# Patient Record
Sex: FEMALE | Race: WHITE | NOT HISPANIC OR LATINO | Employment: FULL TIME | URBAN - METROPOLITAN AREA
[De-identification: names, ages, dates, MRNs, and addresses within clinical notes are randomized per-mention and may not be internally consistent; named-entity substitution may affect disease eponyms.]

---

## 2017-11-27 ENCOUNTER — MYC MEDICAL ADVICE (OUTPATIENT)
Dept: NURSING | Facility: CLINIC | Age: 37
End: 2017-11-27

## 2017-11-27 DIAGNOSIS — Z32.01 PREGNANCY TEST POSITIVE: Primary | ICD-10-CM

## 2017-11-28 NOTE — TELEPHONE ENCOUNTER
Rescheduled pt's appointments so that she has OB intake and new OB with Dr. Best.  Is also scheduled for lab draw for HCG.  U/S is not ordered yet.  Isis Luciano RN

## 2017-11-28 NOTE — TELEPHONE ENCOUNTER
Daniellet was sent to patient, but no response. Call to patient and left message. Need to see if she is pregnant and LMP. If she is, will do serial quants and early US. Needs to set up nurse intake and new prenatal with BE, but does NOT need to be see on 12/22.   Josiane Suero

## 2017-11-30 DIAGNOSIS — Z32.01 PREGNANCY TEST POSITIVE: ICD-10-CM

## 2017-11-30 PROCEDURE — 84702 CHORIONIC GONADOTROPIN TEST: CPT | Performed by: OBSTETRICS & GYNECOLOGY

## 2017-11-30 PROCEDURE — 36415 COLL VENOUS BLD VENIPUNCTURE: CPT | Performed by: OBSTETRICS & GYNECOLOGY

## 2017-12-01 LAB — B-HCG SERPL-ACNC: ABNORMAL IU/L (ref 0–5)

## 2017-12-12 ENCOUNTER — PRENATAL OFFICE VISIT (OUTPATIENT)
Dept: NURSING | Facility: CLINIC | Age: 37
End: 2017-12-12
Payer: COMMERCIAL

## 2017-12-12 VITALS
HEIGHT: 69 IN | DIASTOLIC BLOOD PRESSURE: 65 MMHG | SYSTOLIC BLOOD PRESSURE: 110 MMHG | RESPIRATION RATE: 14 BRPM | OXYGEN SATURATION: 99 % | TEMPERATURE: 97.9 F | HEART RATE: 88 BPM | BODY MASS INDEX: 27.36 KG/M2 | WEIGHT: 184.7 LBS

## 2017-12-12 DIAGNOSIS — Z34.80 PRENATAL CARE, SUBSEQUENT PREGNANCY, UNSPECIFIED TRIMESTER: Primary | ICD-10-CM

## 2017-12-12 LAB
ABO + RH BLD: NORMAL
ABO + RH BLD: NORMAL
ALBUMIN UR-MCNC: NEGATIVE MG/DL
APPEARANCE UR: CLEAR
BILIRUB UR QL STRIP: NEGATIVE
BLD GP AB SCN SERPL QL: NORMAL
BLOOD BANK CMNT PATIENT-IMP: NORMAL
COLOR UR AUTO: YELLOW
ERYTHROCYTE [DISTWIDTH] IN BLOOD BY AUTOMATED COUNT: 13.2 % (ref 10–15)
GLUCOSE UR STRIP-MCNC: NEGATIVE MG/DL
HCT VFR BLD AUTO: 38.8 % (ref 35–47)
HGB BLD-MCNC: 13.2 G/DL (ref 11.7–15.7)
HGB UR QL STRIP: NEGATIVE
KETONES UR STRIP-MCNC: ABNORMAL MG/DL
LEUKOCYTE ESTERASE UR QL STRIP: NEGATIVE
MCH RBC QN AUTO: 29.8 PG (ref 26.5–33)
MCHC RBC AUTO-ENTMCNC: 34 G/DL (ref 31.5–36.5)
MCV RBC AUTO: 88 FL (ref 78–100)
NITRATE UR QL: NEGATIVE
PH UR STRIP: 6.5 PH (ref 5–7)
PLATELET # BLD AUTO: 229 10E9/L (ref 150–450)
RBC # BLD AUTO: 4.43 10E12/L (ref 3.8–5.2)
SOURCE: ABNORMAL
SP GR UR STRIP: 1.02 (ref 1–1.03)
SPECIMEN EXP DATE BLD: NORMAL
UROBILINOGEN UR STRIP-ACNC: 0.2 EU/DL (ref 0.2–1)
WBC # BLD AUTO: 9.6 10E9/L (ref 4–11)

## 2017-12-12 PROCEDURE — 81003 URINALYSIS AUTO W/O SCOPE: CPT | Performed by: OBSTETRICS & GYNECOLOGY

## 2017-12-12 PROCEDURE — 86762 RUBELLA ANTIBODY: CPT | Performed by: OBSTETRICS & GYNECOLOGY

## 2017-12-12 PROCEDURE — 99207 ZZC NO CHARGE NURSE ONLY: CPT

## 2017-12-12 PROCEDURE — 86850 RBC ANTIBODY SCREEN: CPT | Performed by: OBSTETRICS & GYNECOLOGY

## 2017-12-12 PROCEDURE — 86901 BLOOD TYPING SEROLOGIC RH(D): CPT | Performed by: OBSTETRICS & GYNECOLOGY

## 2017-12-12 PROCEDURE — 87340 HEPATITIS B SURFACE AG IA: CPT | Performed by: OBSTETRICS & GYNECOLOGY

## 2017-12-12 PROCEDURE — 87389 HIV-1 AG W/HIV-1&-2 AB AG IA: CPT | Performed by: OBSTETRICS & GYNECOLOGY

## 2017-12-12 PROCEDURE — 86780 TREPONEMA PALLIDUM: CPT | Performed by: OBSTETRICS & GYNECOLOGY

## 2017-12-12 PROCEDURE — 87086 URINE CULTURE/COLONY COUNT: CPT | Performed by: OBSTETRICS & GYNECOLOGY

## 2017-12-12 PROCEDURE — 85027 COMPLETE CBC AUTOMATED: CPT | Performed by: OBSTETRICS & GYNECOLOGY

## 2017-12-12 PROCEDURE — 36415 COLL VENOUS BLD VENIPUNCTURE: CPT | Performed by: OBSTETRICS & GYNECOLOGY

## 2017-12-12 PROCEDURE — 86900 BLOOD TYPING SEROLOGIC ABO: CPT | Performed by: OBSTETRICS & GYNECOLOGY

## 2017-12-12 NOTE — MR AVS SNAPSHOT
After Visit Summary   12/12/2017    Roshni Oliver    MRN: [id number]           Patient Information     Date Of Birth          1980        Visit Information        Provider Department      12/12/2017 2:30 PM RD OB NURSE EDUCATION [facility]        Today's Diagnoses     Prenatal care, subsequent pregnancy, unspecified trimester    -  1       Follow-ups after your visit        Additional Services     MAT FETAL MED CTR REFERRAL-PREGNANCY       >> Patient may proceed with recommendations for further testing as directed by the Maternal Fetal Medicine Specialist >>    >> If requesting Fetal Echo: MFM will determine appropriate location for exam due to indication.    >> If requesting Lung Maturity Amnio:  If results indicate fetal lung maturity, induction or C/S is recommended within 36 hours.  Please schedule accordingly.     Dear Patient:   Please be aware that coverage of these services is subject to the terms and limitations of your health insurance plan.  Call member services at your health plan with any benefit or coverage questions.      Please bring the following to your appointment:    >>  Any x-rays, CTs or MRIs which have been performed.  Contact the facility where they were done to arrange for  prior to your scheduled appointment.  Any new CT, MRI or other procedures ordered by your specialist must be performed at a [facility] facility or coordinated by your clinic's referral office.  >>  List of current medications   >>  This referral request   >>  Any documents/labs given to you for this referral                  Your next 10 appointments already scheduled     Dec 15, 2017  4:00 PM CST   US OB < 14 WEEKS SINGLE with RDUS1   [facility] [place]    [address] [place] [address]  [address] [place] [address] [phone]           Please bring a list of your medicines (including vitamins, minerals and over-the-counter drugs). Also, tell your  doctor about any allergies you may have. Wear comfortable clothes and leave your valuables at home.  If you re less than 20 weeks drink four 8-ounce glasses of fluid an hour before your exam. If you need to empty your bladder before your exam, try to release only a little urine. Then, drink another glass of fluid.  You may have up to two family members in the exam room. If you bring a small child, an adult must be there to care for him or her.  Please call the Imaging Department at your exam site with any questions.            Jan 03, 2018  3:00 PM CST   [facility] Prenatal with India Best MD   [facility]    [address] [facility] [address] [phone]              Future tests that were ordered for you today     Open Future Orders        Priority Expected Expires Ordered    M Genetic Counseling Routine  12/12/2018 12/12/2017    [facility] Nuchal Transluc w US Single Routine  10/12/2018 12/12/2017            Who to contact     If you have questions or need follow up information about today's clinic visit or your schedule please contact [facility] directly at [phone].  Normal or non-critical lab and imaging results will be communicated to you by MyChart, letter or phone within 4 business days after the clinic has received the results. If you do not hear from us within 7 days, please contact the clinic through MyChart or phone. If you have a critical or abnormal lab result, we will notify you by phone as soon as possible.  Submit refill requests through [employer] or call your pharmacy and they will forward the refill request to us. Please allow 3 business days for your refill to be completed.          Additional Information About Your Visit        [employer] Information     [employer] gives you secure access to your electronic health record. If you see a primary care provider, you can also send messages to your care team and make  "appointments. If you have questions, please call your primary care clinic.  If you do not have a primary care provider, please call [phone] and they will assist you.        Care EveryWhere ID     This is your Care EveryWhere ID. This could be used by other organizations to access your [facility] medical records  [id number]        Your Vitals Were     Pulse Temperature Respirations Height Last Period Pulse Oximetry    88 97.9  F (36.6  C) 14 5' 9\" (1.753 m) 10/14/2017 (Exact Date) 99%    Breastfeeding? BMI (Body Mass Index)                No 27.28 kg/m2           Blood Pressure from Last 3 Encounters:   12/12/17 110/65   06/03/16 110/64   04/21/16 113/80    Weight from Last 3 Encounters:   12/12/17 184 lb 11.2 oz (83.8 kg)   06/03/16 172 lb (78 kg)   04/15/16 204 lb (92.5 kg)              We Performed the Following     ABO/RH Type and Screen     Anti Treponema     CBC with Platelets     Hepatitis B surface antigen     HIV Antigen Antibody Combo     MAT FETAL MED CTR REFERRAL-PREGNANCY     Rubella Antibody IgG Quantitative     UA without Microscopic     Urine Culture Aerobic Bacterial        Primary Care Provider Office Phone # Fax #    Sonia CASEY [facility] [phone] [address] [facility] [address]        Equal Access to Services     FLORENCIA BRAVO AH: Hadii aad ku hadasho Soomaali, waaxda luqadaha, qaybta kaalmada adeegyada, shan zheng hayalissa taylor. So [facility] [phone].    ATENCIÓN: Si habla español, tiene a gonzáles disposición servicios gratuitos de asistencia lingüística. Llame al [phone].    We comply with applicable federal civil rights laws and [place] laws. We do not discriminate on the basis of race, color, national origin, age, disability, sex, sexual orientation, or gender identity.            Thank you!     Thank you for choosing [facility]  for your care. Our goal is always to provide you with excellent care. Hearing back from " our patients is one way we can continue to improve our services. Please take a few minutes to complete the written survey that you may receive in the mail after your visit with us. Thank you!             Your Updated Medication List - Protect others around you: Learn how to safely use, store and throw away your medicines at www.disposemymeds.org.          This list is accurate as of: 12/12/17  3:56 PM.  Always use your most recent med list.                   Brand Name Dispense Instructions for use Diagnosis    prenatal multivitamin plus iron 27-0.8 MG Tabs per tablet      Take 1 tablet by mouth daily

## 2017-12-12 NOTE — PROGRESS NOTES
Patient presents for new ob teaching and labs, third pregnancy. LMP was 10/14/17. US scheduled for 12/15. Handouts reviewed and given. First trimester screen ordered. Patient is having a lot of nausea and vomiting. Discussed combination of Vitamin B6 and Unisom tablets. Has appointment scheduled with Dr Best on 1/3/18.    Caffeine intake/servings daily - 1  Calcium intake/servings daily - 2  Exercise 0 times weekly - describe 0  Sunscreen used - Yes  Seatbelts used - Yes  Guns stored in the home - No  Self Breast Exam - Yes  Pap test up to date -  Yes  Eye exam up to date -  No  Dental exam up to date -  Yes  DEXA scan up to date -  Not Applicable  Flex Sig/Colonoscopy up to date -  Not Applicable  Mammography up to date -  Not Applicable  Immunizations reviewed and up to date - Yes  Abuse: Current or Past (Physical, Sexual or Emotional) - No  Do you feel safe in your environment - Yes  Do you cope well with stress - Yes  Do you suffer from insomnia - No  Last updated by: Kerry Hung  2017    Prenatal OB Questionnaire  Past Medical History  Diabetes   No  Hypertension   No  Heart Disease, mitral valve prolapse, or rheumatic fever?   No  An autoimmune disorder such as Lupus or Rheumatoid Arthritis?   No  Kidney Disease or Urinary Tract Infection?   No  Epilepsy, seizures or spells?   No  Migraine headaches?   No  A stroke or loss of function or sensation?   No  Any other neurological problems?   No  Have you ever been treated for depression?  No  Are you having problems with crying spells or loss of self-esteem?   No  Have you ever required psychiatric care?   No  Have you ever hepatitis, liver disease or jaundice?   Yes ( jaundice)  Have you ever been treated for blood clots in your veins, deep venous thrombosis, inflammation in the veins, thrombosis, phlebitis, pulmonary embolism or varicosities?   No  Have you had excessive bleeding after surgery or dental work?   No  Do you bleed more than  other women after a cut or scratch?   No  Do you have a history of anemia?   No  Have you ever been treated for thyroid problems or taken thyroid medication?  No  Do you have any other endocrine problems?  No  Have you ever been in a major accident or suffered serious trauma?   No  Within the last year, has anyone hit slapped, kicked or otherwise hurt you?  No  In the last year, has anyone forced you to have sex when you didn't want to?  No  Have you ever had a blood transfusion?   No  Would you refuse a blood transfusion if a doctor judged it to be medically necessary?   No  If you answered yes, would you rather die than have a blood transfusion?   N/A  If you answered yes, is this for Mandaeism reasons?   N/A  Does anyone in your home smoke?   No  Do you use tobacco products?  No  Do you drink beer, wine, hard liquor?  No  Do you use any of the following: marijuana, speed, cocaine, heroine, hallucinogens, or other drugs?  No  Is your blood type Rh negative?   No  Have you ever had abnormal antibodies in your blood?   No  Have you ever had asthma?   No  Have you ever had tuberculosis?   No  Do you have any allergies to drugs or over-the-counter medications?   Yes Sulfa drugs    Allergies as of 12/12/2017:    Allergies as of 12/12/2017 - Rober as Reviewed 06/03/2016   Allergen Reaction Noted     Sulfa drugs Itching 11/02/2010       Do you have any breast problems?   No  Have you ever ?   Yes  Have you had any gynecological surgical procedures such as cervical conization, a LEEP procedure, laser treatment, cryosurgery of the cervix, or a dilation and curettage, etc?  No  Have you had any other surgical procedures?  Yes Umbilical hernia - age 4  Have you been hospitalized for a nonsurgical reason excluding normal delivery?   No  Have you ever had any anesthetic complications?   No  Have you ever had an abnormal pap smear?   No  Do you have a history of abnormalities of the uterus?   No  Did it take you more  than one year to become pregnant?   No  Have you ever been evaluated or treated for infertility?   No  Is there a history of medical problems in your family, which you feel might adversely affect your health or pregnancy?   No  Do you have any other problems we have not asked you about which you feel may be important to this pregnancy?  No    Symptoms since Last Menstrual Period  Do you have any of the following:    *abdominal pain  No  *blood in stool or urine  No  *chest pain  No  *shortness of breath  No  *coughing or vomiting up blood No  *heart racing or skipping beats  No  *nausea and vomiting  Yes  *pain with urination  No  *vaginal discharge or bleeding  No  Current medications are:  Current Outpatient Prescriptions   Medication Sig Dispense Refill     norethindrone (MICRONOR) 0.35 MG per tablet Take 1 tablet (0.35 mg) by mouth daily 84 tablet 3     ibuprofen (ADVIL,MOTRIN) 400 MG tablet Take 1-2 tablets (400-800 mg) by mouth every 6 hours as needed for other (cramping) 60 tablet 1     Prenatal Vit-Fe Fumarate-FA (PRENATAL MULTIVITAMIN  PLUS IRON) 27-0.8 MG TABS Take 1 tablet by mouth daily         Genetic Screening  At the time of birth, will you be 35 years old or older?  Yes  Has the patient, baby s father, or anyone in either family had:  Thalassemia (Italian, Greek, Mediterranean, or  background only) and an MCV result less than 80?  No  Neural tube defect such as meningomyelocele, spina bifida or anencephaly?  No  Congenital heart defect?  No  Down s syndrome?  No  Ronal-Sach s disease (Muslim, Cajun, French-Indian)?  No  Sickle cell disease or trait (Rima)?  No  Hemophilia or other inherited problems of blood coagulation? No  Muscular dystrophy?  No  Cystic Fibrosis?  No  [place] s chorea?  No  Mental retardation/autism? No   If yes, was the person tested for fragile X?  No  Any other inherited genetic or chromosomal disorder?  No  Maternal metabolic disorder (e.g. insulin-dependent  diabetes, PKU)? No  A child with birth defects not listed above?  No  Recurrent pregnancy loss or a stillbirth?  No  Has the patient had any medications/street drugs/alcohol since her last menstrual period? No  Does the patient or baby s father have any other genetic risks?  No  Infection History  Do you object to being tested for Hepatitis B? No  Do you object to being tested for HIV? No  Do you feel that you are at high risk for coming in contact with the AIDS virus?  No  Have you ever been treated for tuberculosis?  No  Have you ever received the BCG vaccine for tuberculosis?  No  Have you ever had a positive skin test for tuberculosis? No  Do you live with someone who has tuberculosis?  No  Have you ever been exposed to tuberculosis?  No  Do you have genital herpes?  No  Does your partner have genital herpes?  No  Have you had a rash or viral illness since your last period?  No  Have you ever had Gonorrhea, Chlamydia, Syphilis, venereal warts, trichomoniasis, pelvic inflammatory disease or any other sexually transmitted disease?  No  Do you know if you are a genital group B streptococcus carrier? No  You have not had chicken pox/varicella  No  Have you been vaccinated against chicken pox?  No  Have you had any other infectious disease? No        Early ultrasound screening tool:    Does patient have irregular periods?  No  Did patient use hormonal birth control in the three months prior to positive urine pregnancy test? No  Is the patient breastfeeding?  No  Is the patient 10 weeks or greater at time of education visit?  No    Kerry Hung RN-BSN

## 2017-12-13 LAB
BACTERIA SPEC CULT: NORMAL
HBV SURFACE AG SERPL QL IA: NONREACTIVE
HIV 1+2 AB+HIV1 P24 AG SERPL QL IA: NONREACTIVE
RUBV IGG SERPL IA-ACNC: 12 IU/ML
SPECIMEN SOURCE: NORMAL
T PALLIDUM IGG+IGM SER QL: NEGATIVE

## 2017-12-21 ENCOUNTER — RADIANT APPOINTMENT (OUTPATIENT)
Dept: ULTRASOUND IMAGING | Facility: CLINIC | Age: 37
End: 2017-12-21
Attending: OBSTETRICS & GYNECOLOGY
Payer: COMMERCIAL

## 2017-12-21 DIAGNOSIS — Z32.01 PREGNANCY TEST POSITIVE: ICD-10-CM

## 2017-12-21 PROCEDURE — 76815 OB US LIMITED FETUS(S): CPT | Performed by: OBSTETRICS & GYNECOLOGY

## 2018-01-03 ENCOUNTER — PRENATAL OFFICE VISIT (OUTPATIENT)
Dept: OBGYN | Facility: CLINIC | Age: 38
End: 2018-01-03
Payer: COMMERCIAL

## 2018-01-03 VITALS
WEIGHT: 167 LBS | BODY MASS INDEX: 24.66 KG/M2 | TEMPERATURE: 98 F | DIASTOLIC BLOOD PRESSURE: 70 MMHG | SYSTOLIC BLOOD PRESSURE: 108 MMHG

## 2018-01-03 DIAGNOSIS — K21.9 GASTROESOPHAGEAL REFLUX DISEASE WITHOUT ESOPHAGITIS: ICD-10-CM

## 2018-01-03 DIAGNOSIS — O21.0 HYPEREMESIS GRAVIDARUM: ICD-10-CM

## 2018-01-03 DIAGNOSIS — Z34.00 SUPERVISION OF NORMAL FIRST PREGNANCY, ANTEPARTUM: Primary | ICD-10-CM

## 2018-01-03 DIAGNOSIS — Z23 NEED FOR PROPHYLACTIC VACCINATION AND INOCULATION AGAINST INFLUENZA: ICD-10-CM

## 2018-01-03 PROCEDURE — 99207 ZZC FIRST OB VISIT: CPT | Performed by: OBSTETRICS & GYNECOLOGY

## 2018-01-03 PROCEDURE — 90686 IIV4 VACC NO PRSV 0.5 ML IM: CPT | Performed by: OBSTETRICS & GYNECOLOGY

## 2018-01-03 PROCEDURE — 90471 IMMUNIZATION ADMIN: CPT | Performed by: OBSTETRICS & GYNECOLOGY

## 2018-01-03 RX ORDER — ONDANSETRON 4 MG/1
4-8 TABLET, ORALLY DISINTEGRATING ORAL EVERY 8 HOURS PRN
Qty: 60 TABLET | Refills: 3 | Status: SHIPPED | OUTPATIENT
Start: 2018-01-03 | End: 2018-01-19

## 2018-01-03 RX ORDER — OMEPRAZOLE 40 MG/1
40 CAPSULE, DELAYED RELEASE ORAL DAILY
Qty: 90 CAPSULE | Refills: 3 | Status: SHIPPED | OUTPATIENT
Start: 2018-01-03 | End: 2018-07-03

## 2018-01-03 NOTE — PROGRESS NOTES
Here with spouse and happy about this pregnancy. Dates by LMP c/w 9 wk u/s. Wants to do first trimester screen and will schedule. Has been very sick and losing weight, cannot even keep water down and lots of GERD. Will start zofran now and prilosec, discussed can do IV fluids if needed and just let us know. Flu shot today. Providers/residents, weight gain/exercise, delivery discussed. RTC 4 weeks. BE    HPI:  Roshni Oliver is a 37 year old female Patient's last menstrual period was 10/14/2017 (exact date). at 11w4d, Estimated Date of Delivery: Jul 21, 2018.  She denies vaginal bleeding and abdominal pain. Happy about pregnancy.   No other c/o.    Past Medical History:   Diagnosis Date     UTI (urinary tract infection) 05/11       Past Surgical History:   Procedure Laterality Date     DENTAL SURGERY  2004    wisdom teeth     HERNIA REPAIR, UMBILICAL  5 y/o, 1983       Allergies: Sulfa drugs     EXAM:  Blood pressure 108/70, temperature 98  F (36.7  C), temperature source Oral, weight 167 lb (75.8 kg), last menstrual period 10/14/2017, not currently breastfeeding.   BMI= Body mass index is 24.66 kg/(m^2).  General - pleasant female in no acute distress.  Neck - supple without lymphadenopathy or thyromegaly.  Lungs - clear to auscultation bilaterally.  Heart - regular rate and rhythm without murmur.  Breast - no nodularity, asymmetry or nipple discharge bilaterally.  Abdomen - soft, nontender, nondistended, no hepatosplenomegaly.  Pelvic - EG: normal adult female, BUS: within normal limits, Vagina: well rugated, no discharge, Cervix: no lesions or CMT, closed/long Uterus: gravid, consistant with dates, mobile, Adnexae: no masses or tenderness.  Rectovaginal - deferred.  Musculoskeletal - no gross deformities.  Neurological - normal strength, sensation, and mental status.    Doptones were 152    ASSESSMENT/PLAN:  (Z34.00) Supervision of normal first pregnancy, antepartum  (primary encounter diagnosis)  Comment:  AMA  Plan: Offered genetic testing ([facility]/amnio), genetic counseling, quad screen, first trimester screening and level 2 ultrasound as options.  Patient to decide.    (K21.9) Gastroesophageal reflux disease without esophagitis  Plan: omeprazole (PRILOSEC) 40 MG capsule        Start daily prilosec.    (O21.0) Hyperemesis gravidarum  Comment: reglan did not work  Plan: ondansetron (ZOFRAN ODT) 4 MG ODT tab        Will try zofran    (Z23) Need for prophylactic vaccination and inoculation against influenza  Plan: FLU VAC, SPLIT VIRUS IM > 3 YO (QUADRIVALENT)         [10952], Vaccine Administration, Initial         [83583]      Weight gain and exercise during pregnancy was discussed at today's visit.  The patient will return to clinic in 4 weeks for continued prenatal care.

## 2018-01-03 NOTE — MR AVS SNAPSHOT
After Visit Summary   1/3/2018    Roshni Oliver    MRN: [id number]           Patient Information     Date Of Birth          1980        Visit Information        Provider Department      1/3/2018 3:00 PM India Best MD [facility]        Today's Diagnoses     Supervision of normal first pregnancy, antepartum    -  1    Gastroesophageal reflux disease without esophagitis        Hyperemesis gravidarum        Need for prophylactic vaccination and inoculation against influenza           Follow-ups after your visit        Who to contact     If you have questions or need follow up information about today's clinic visit or your schedule please contact [facility] directly at [phone].  Normal or non-critical lab and imaging results will be communicated to you by MyChart, letter or phone within 4 business days after the clinic has received the results. If you do not hear from us within 7 days, please contact the clinic through [employer] or phone. If you have a critical or abnormal lab result, we will notify you by phone as soon as possible.  Submit refill requests through [employer] or call your pharmacy and they will forward the refill request to us. Please allow 3 business days for your refill to be completed.          Additional Information About Your Visit        MyChart Information     [employer] gives you secure access to your electronic health record. If you see a primary care provider, you can also send messages to your care team and make appointments. If you have questions, please call your primary care clinic.  If you do not have a primary care provider, please call [phone] and they will assist you.        Care EveryWhere ID     This is your Care EveryWhere ID. This could be used by other organizations to access your [facility] medical records  [id number]        Your Vitals Were     Temperature Last Period BMI (Body Mass Index)             98  F  (36.7  C) (Oral) 10/14/2017 (Exact Date) 24.66 kg/m2          Blood Pressure from Last 3 Encounters:   01/03/18 108/70   12/12/17 110/65   06/03/16 110/64    Weight from Last 3 Encounters:   01/03/18 167 lb (75.8 kg)   12/12/17 184 lb 11.2 oz (83.8 kg)   06/03/16 172 lb (78 kg)              We Performed the Following     FLU VAC, SPLIT VIRUS IM > 3 YO (QUADRIVALENT) [85392]     Vaccine Administration, Initial [37091]          Today's Medication Changes          These changes are accurate as of: 1/3/18  4:21 PM.  If you have any questions, ask your nurse or doctor.               Start taking these medicines.        Dose/Directions    omeprazole 40 MG capsule   Commonly known as:  priLOSEC   Used for:  Gastroesophageal reflux disease without esophagitis        Dose:  40 mg   Take 1 capsule (40 mg) by mouth daily Take 30-60 minutes before a meal.   Quantity:  90 capsule   Refills:  3       ondansetron 4 MG ODT tab   Commonly known as:  ZOFRAN ODT   Used for:  Hyperemesis gravidarum        Dose:  4-8 mg   Take 1-2 tablets (4-8 mg) by mouth every 8 hours as needed for nausea   Quantity:  60 tablet   Refills:  3            Where to get your medicines      These medications were sent to [facility] - [place], [place] - [address]  [address], [place] [address]     Phone:  [phone]     omeprazole 40 MG capsule    ondansetron 4 MG ODT tab                Primary Care Provider Office Phone # Fax #    Sonia Wendy Ash, APRN [facility] [phone]       [address] [place] [address]        Equal Access to Services     [facility] SHELLY AH: Hussein De La Garza, wachrista lunessadaha, qasolitariota kaalmada adekarstenyada, shan taylor. So [facility] [phone].    ATENCIÓN: Si habla español, tiene a gonzáles disposición servicios gratuitos de asistencia lingüística. Llame al [phone].    We comply with applicable federal civil rights laws and [place] laws. We do not  discriminate on the basis of race, color, national origin, age, disability, sex, sexual orientation, or gender identity.            Thank you!     Thank you for choosing [facility]  for your care. Our goal is always to provide you with excellent care. Hearing back from our patients is one way we can continue to improve our services. Please take a few minutes to complete the written survey that you may receive in the mail after your visit with us. Thank you!             Your Updated Medication List - Protect others around you: Learn how to safely use, store and throw away your medicines at www.disposemymeds.org.          This list is accurate as of: 1/3/18  4:21 PM.  Always use your most recent med list.                   Brand Name Dispense Instructions for use Diagnosis    metoclopramide 10 MG tablet    REGLAN    120 tablet    Take 1 tablet (10 mg) by mouth 4 times daily (before meals and nightly)    Hyperemesis gravidarum       omeprazole 40 MG capsule    priLOSEC    90 capsule    Take 1 capsule (40 mg) by mouth daily Take 30-60 minutes before a meal.    Gastroesophageal reflux disease without esophagitis       ondansetron 4 MG ODT tab    ZOFRAN ODT    60 tablet    Take 1-2 tablets (4-8 mg) by mouth every 8 hours as needed for nausea    Hyperemesis gravidarum       prenatal multivitamin plus iron 27-0.8 MG Tabs per tablet      Take 1 tablet by mouth daily

## 2018-01-03 NOTE — PROGRESS NOTES

## 2018-01-10 ENCOUNTER — PRE VISIT (OUTPATIENT)
Dept: MATERNAL FETAL MEDICINE | Facility: CLINIC | Age: 38
End: 2018-01-10

## 2018-01-12 ENCOUNTER — HOSPITAL ENCOUNTER (OUTPATIENT)
Dept: ULTRASOUND IMAGING | Facility: CLINIC | Age: 38
Discharge: HOME | End: 2018-01-12
Attending: OBSTETRICS & GYNECOLOGY | Admitting: OBSTETRICS & GYNECOLOGY
Payer: COMMERCIAL

## 2018-01-12 ENCOUNTER — OFFICE VISIT (OUTPATIENT)
Dept: MATERNAL FETAL MEDICINE | Facility: CLINIC | Age: 38
End: 2018-01-12
Attending: OBSTETRICS & GYNECOLOGY
Payer: COMMERCIAL

## 2018-01-12 DIAGNOSIS — O26.90 PREGNANCY RELATED CONDITION, ANTEPARTUM: ICD-10-CM

## 2018-01-12 DIAGNOSIS — O09.521 AMA (ADVANCED MATERNAL AGE) MULTIGRAVIDA 35+, FIRST TRIMESTER: ICD-10-CM

## 2018-01-12 DIAGNOSIS — O09.521 AMA (ADVANCED MATERNAL AGE) MULTIGRAVIDA 35+, FIRST TRIMESTER: Primary | ICD-10-CM

## 2018-01-12 DIAGNOSIS — O09.521 ELDERLY MULTIGRAVIDA IN FIRST TRIMESTER: Primary | ICD-10-CM

## 2018-01-12 DIAGNOSIS — Z36.82 ENCOUNTER FOR (NT) NUCHAL TRANSLUCENCY SCAN: ICD-10-CM

## 2018-01-12 PROCEDURE — 40000791 ZZHCL STATISTIC VERIFI PRENATAL TRISOMY 21,18,13: Performed by: OBSTETRICS & GYNECOLOGY

## 2018-01-12 PROCEDURE — 76813 OB US NUCHAL MEAS 1 GEST: CPT

## 2018-01-12 PROCEDURE — 96040 ZZH GENETIC COUNSELING, EACH 30 MINUTES: CPT | Mod: ZF | Performed by: GENETIC COUNSELOR, MS

## 2018-01-12 PROCEDURE — 36415 COLL VENOUS BLD VENIPUNCTURE: CPT | Performed by: OBSTETRICS & GYNECOLOGY

## 2018-01-12 NOTE — MR AVS SNAPSHOT
After Visit Summary   1/12/2018    Roshni Oliver    MRN: [id number]           Patient Information     Date Of Birth          1980        Visit Information        Provider Department      1/12/2018 2:00 PM Jose Alejandro Figueroa MD [facility] Maternal Fetal Medicine [facility]        Today's Diagnoses     Elderly multigravida in first trimester    -  1    Encounter for (NT) nuchal translucency scan           Follow-ups after your visit        Your next 10 appointments already scheduled     Feb 26, 2018  2:15 PM CST   (Arrive by 2:00 PM)   MFM US COMP with URMFMUSR2   [facility] Maternal Fetal Medicine Ultrasound - [facility])    [address]  [facility] [id number] [phone]           Wear comfortable clothes and leave your valuables at home.            Feb 26, 2018  2:45 PM CST   Radiology MD with UR JAMMIE HERNANDEZ   [facility] Maternal Fetal Medicine - [facility])    [address]  [facility] [address]   [phone]           Please arrive at the time given for your first appointment. This visit is used internally to schedule the physician's time during your ultrasound.              Future tests that were ordered for you today     Open Future Orders        Priority Expected Expires Ordered    M US Comprehensive Single Routine  11/12/2018 1/12/2018            Who to contact     If you have questions or need follow up information about today's clinic visit or your schedule please contact [facility] MATERNAL FETAL MEDICINE [facility] directly at [phone].  Normal or non-critical lab and imaging results will be communicated to you by MyChart, letter or phone within 4 business days after the clinic has received the results. If you do not hear from us within 7 days, please contact the clinic through MyChart or phone. If you have a critical or abnormal lab result, we will notify you by phone as soon as  possible.  Submit refill requests through [employer] or call your pharmacy and they will forward the refill request to us. Please allow 3 business days for your refill to be completed.          Additional Information About Your Visit        [email or web address] Information     [employer] gives you secure access to your electronic health record. If you see a primary care provider, you can also send messages to your care team and make appointments. If you have questions, please call your primary care clinic.  If you do not have a primary care provider, please call [phone] and they will assist you.        Care EveryWhere ID     This is your Care EveryWhere ID. This could be used by other organizations to access your [facility] medical records  [id number]        Your Vitals Were     Last Period                   10/14/2017 (Exact Date)            Blood Pressure from Last 3 Encounters:   01/03/18 108/70   12/12/17 110/65   06/03/16 110/64    Weight from Last 3 Encounters:   01/03/18 75.8 kg (167 lb)   12/12/17 83.8 kg (184 lb 11.2 oz)   06/03/16 78 kg (172 lb)               Primary Care Provider Office Phone # Fax #    Sonia Nguyen CASEY [facility] [phone] [address] [facility] [address]        Equal Access to Services     FLORENCIA BRAVO AH: Hadii aad ku hadasho Soomaali, waaxda luqadaha, qaybta kaalmada adeegyada, shan taylor. So [place] [phone].    ATENCIÓN: Si habla español, tiene a gonzáles disposición servicios gratuitos de asistencia lingüística. Llame al [phone].    We comply with applicable federal civil rights laws and [place] laws. We do not discriminate on the basis of race, color, national origin, age, disability, sex, sexual orientation, or gender identity.            Thank you!     Thank you for choosing [facility]  for your care. Our goal is always to provide you with excellent care. Hearing back from our patients is one way  we can continue to improve our services. Please take a few minutes to complete the written survey that you may receive in the mail after your visit with us. Thank you!             Your Updated Medication List - Protect others around you: Learn how to safely use, store and throw away your medicines at www.disposemymeds.org.          This list is accurate as of: 1/12/18  3:52 PM.  Always use your most recent med list.                   Brand Name Dispense Instructions for use Diagnosis    metoclopramide 10 MG tablet    REGLAN    120 tablet    Take 1 tablet (10 mg) by mouth 4 times daily (before meals and nightly)    Hyperemesis gravidarum       omeprazole 40 MG capsule    priLOSEC    90 capsule    Take 1 capsule (40 mg) by mouth daily Take 30-60 minutes before a meal.    Gastroesophageal reflux disease without esophagitis       ondansetron 4 MG ODT tab    ZOFRAN ODT    60 tablet    Take 1-2 tablets (4-8 mg) by mouth every 8 hours as needed for nausea    Hyperemesis gravidarum       prenatal multivitamin plus iron 27-0.8 MG Tabs per tablet      Take 1 tablet by mouth daily

## 2018-01-12 NOTE — MR AVS SNAPSHOT
After Visit Summary   1/12/2018    Roshni Oliver    MRN: [id number]           Patient Information     Date Of Birth          1980        Visit Information        Provider Department      1/12/2018 12:45 PM Michelle Tee GC [facility] Maternal Fetal Medicine [facility]        Today's Diagnoses     AMA (advanced maternal age) multigravida 35+, first trimester    -  1    Pregnancy related condition, antepartum           Follow-ups after your visit        Your next 10 appointments already scheduled     Feb 26, 2018  2:15 PM CST   (Arrive by 2:00 PM)   MFM US COMP with URMFMUSR2   [facility] Maternal Fetal Medicine Ultrasound - [place] ([facility])    [address]  [facility] [address] [phone]           Wear comfortable clothes and leave your valuables at home.            Feb 26, 2018  2:45 PM CST   Radiology MD with UR JAMMIE HERNANDEZ   [facility] Maternal Fetal Medicine - [facility] [place])    [address]  [facility] [address]   [phone]           Please arrive at the time given for your first appointment. This visit is used internally to schedule the physician's time during your ultrasound.              Future tests that were ordered for you today     Open Future Orders        Priority Expected Expires Ordered    MFM US Comprehensive Single Routine  11/12/2018 1/12/2018            Who to contact     If you have questions or need follow up information about today's clinic visit or your schedule please contact [facility] MATERNAL FETAL MEDICINE [facility] directly at [phone].  Normal or non-critical lab and imaging results will be communicated to you by MyChart, letter or phone within 4 business days after the clinic has received the results. If you do not hear from us within 7 days, please contact the clinic through MyChart or phone. If you have a critical or abnormal lab result, we will notify  you by phone as soon as possible.  Submit refill requests through [employer] or call your pharmacy and they will forward the refill request to us. Please allow 3 business days for your refill to be completed.          Additional Information About Your Visit        [employer] Information     [employer] gives you secure access to your electronic health record. If you see a primary care provider, you can also send messages to your care team and make appointments. If you have questions, please call your primary care clinic.  If you do not have a primary care provider, please call [phone] and they will assist you.        Care EveryWhere ID     This is your Care EveryWhere ID. This could be used by other organizations to access your [place] medical records  [id number]        Your Vitals Were     Last Period                   10/14/2017 (Exact Date)            Blood Pressure from Last 3 Encounters:   01/03/18 108/70   12/12/17 110/65   06/03/16 110/64    Weight from Last 3 Encounters:   01/03/18 75.8 kg (167 lb)   12/12/17 83.8 kg (184 lb 11.2 oz)   06/03/16 78 kg (172 lb)              We Performed the Following     [facility] Genetic Counseling        Primary Care Provider Office Phone # Fax #    Sonia CASEY [facility] [phone] [address] [facility] [address]        Equal Access to Services     FLORENCIA BRAVO : Hadii keren vora hadasho Soriaali, waaxda luqadaha, qaybta kaalmada adeegyada, shan cat . So [facility] [phone].    ATENCIÓN: Si habla español, tiene a gonzáles disposición servicios gratuitos de asistencia lingüística. Llame al [phone].    We comply with applicable federal civil rights laws and [place] laws. We do not discriminate on the basis of race, color, national origin, age, disability, sex, sexual orientation, or gender identity.            Thank you!     Thank you for choosing [facility]  for your care. Our goal is  always to provide you with excellent care. Hearing back from our patients is one way we can continue to improve our services. Please take a few minutes to complete the written survey that you may receive in the mail after your visit with us. Thank you!             Your Updated Medication List - Protect others around you: Learn how to safely use, store and throw away your medicines at www.disposemymeds.org.          This list is accurate as of: 1/12/18 11:59 PM.  Always use your most recent med list.                   Brand Name Dispense Instructions for use Diagnosis    metoclopramide 10 MG tablet    REGLAN    120 tablet    Take 1 tablet (10 mg) by mouth 4 times daily (before meals and nightly)    Hyperemesis gravidarum       omeprazole 40 MG capsule    priLOSEC    90 capsule    Take 1 capsule (40 mg) by mouth daily Take 30-60 minutes before a meal.    Gastroesophageal reflux disease without esophagitis       ondansetron 4 MG ODT tab    ZOFRAN ODT    60 tablet    Take 1-2 tablets (4-8 mg) by mouth every 8 hours as needed for nausea    Hyperemesis gravidarum       prenatal multivitamin plus iron 27-0.8 MG Tabs per tablet      Take 1 tablet by mouth daily

## 2018-01-12 NOTE — PROGRESS NOTES
Please refer to ultrasound report under 'Imaging' Studies of 'Chart Review' tabs.    Jose Alejandro Figueroa M.D.

## 2018-01-12 NOTE — PROGRESS NOTES
"[facility] [place]  Genetic Counseling Consult    Patient: Roshni Oliver YOB: 1980   Date of Service: 18      Roshni \"June\" Benito was seen, along with her  Juan, at [facility] for genetic consultation to discuss the options for screening and testing for fetal chromosome abnormalities.  The indication for genetic counseling is advanced maternal age.        Impression/Plan:   Roshni and Juan decided to have non-invasive prenatal testing (NIPT) to obtain more information about their current pregnancy.  They are aware that this test is a screening test and would likely proceed with a diagnostic test (amniocentesis) if results come back abnormal.      1.  Roshni had an ultrasound and blood draw for NIPT (innatal test through [facility]). Sex chromosome assessment was declined.  Results are expected within 7-10 days, and will be available in [employer].  We will contact her to discuss the results, and a copy will be forwarded to the office of the referring OB provider.  (detailed message on cell phone requested, per patient)     2.  Maternal serum AFP (single marker screen) is recommended after 15 weeks to screen for open neural tube defects. A quad screen should not be performed.    3.  An 18-20 week comprehensive ultrasound is standard of care for all women 35 or older at delivery.    Pregnancy History:   /Parity:    Age at Delivery: 38 year old  ESEQUIEL: 2018, by Last Menstrual Period  Gestational Age: 12w6d    No significant complications or exposures were reported in the current pregnancy.    Medical/Social History:   Roshni reports that she has been experiencing a lot of nausea and vomiting during her pregnancy as well as heart burn.  She is taking Zolfran, which is helping.  Roshni s reported medical history is not expected to impact pregnancy management or risks to fetal development.  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  They have been together for five years, and their families live in the area.       Family History:   A three-generation pedigree was updated from their previous visit and is scanned under the  Media  tab.   The following significant updates were reported:    Roshni's brother now has a daughter.    Juan's brother has identical twin girls conceived through IVF.    Juan reports his mother was found to have Gonzales's polyps in her esophagus.     Otherwise, the reported family history is negative for multiple miscarriages, stillbirths, birth defects, mental retardation, known genetic conditions, and consanguinity.       Carrier Screening:   The patient reports that she and the father of the pregnancy have  ancestry:     Cystic fibrosis is an autosomal recessive genetic condition that occurs with increased frequency in individuals of  ancestry and carrier screening for this condition is available.  In addition,  screening in the [facility] includes cystic fibrosis.      Expanded carrier screening for mutations in a large panel of genes associated with autosomal recessive conditions including cystic fibrosis, spinal muscular atrophy, and others, is now available.      The patient has had previous carrier screening for 87 genetic conditions through the [facility] Carrier Screen, the results of which were negative.  A copy of the report was available for our review today.       Risk Assessment for Chromosome Conditions:   We explained that the risk for fetal chromosome abnormalities increases with maternal age. We discussed specific features of common chromosome abnormalities, including Down syndrome, trisomy 13, trisomy 18, and sex chromosome trisomies.      - At age 38 at midtrimester, the risk to have a baby with Down syndrome is 1 in 216.     - At age 38 at midtrimester, the risk to have a baby with  any chromosome abnormality is 1 in 105.          Testing Options:   We discussed the following options:   Non-invasive Prenatal Testing (NIPT)    Maternal plasma cell-free DNA testing; first trimester ultrasound with nuchal translucency and nasal bone assessment is recommended, when appropriate    Screens for fetal trisomy 21, trisomy 13, trisomy 18, and sex chromosome aneuploidy    Cannot screen for open neural tube defects; maternal serum AFP after 15 weeks is recommended     Genetic Amniocentesis    Invasive procedure typically performed in the second trimester by which amniotic fluid is obtained for the purpose of chromosome analysis and/or other prenatal genetic analysis    Diagnostic results; >99% sensitivity for fetal chromosome abnormalities    AFAFP measurement tests for open neural tube defects          We reviewed the benefits and limitations of this testing.  Screening tests provide a risk assessment specific to the pregnancy for certain fetal chromosome abnormalities, but cannot definitively diagnose or exclude a fetal chromosome abnormality.  Follow-up genetic counseling and consideration of diagnostic testing is recommended with any abnormal screening result.     Diagnostic tests carry inherent risks- including risk of miscarriage- that require careful consideration.  These tests can detect fetal chromosome abnormalities with greater than 99% certainty.  Results can be compromised by maternal cell contamination or mosaicism, and are limited by the resolution of cytogenetic G-banding technology.  There is no screening nor diagnostic test that can detect all forms of birth defects or mental disability.     It was a pleasure to be involved with [facility] care. Face-to-face time of the meeting was 45 minutes.    Carolee Jorge  Genetic Counseling Intern    This note was documented by Malini Jorge, Genetic Counseling Intern scribing for Courtney Tee, Genetic Counselor, who has reviewed this document  and is in agreement with what is stated.      Courtney Tee MS [facility]  Certified Genetic Counselor  Maternal Fetal Medicine  [facility] [place]  Voice Mail:  [phone]  E-Mail:  [email or web address]  Pager:  [phone]

## 2018-01-23 ENCOUNTER — TELEPHONE (OUTPATIENT)
Dept: MATERNAL FETAL MEDICINE | Facility: CLINIC | Age: 38
End: 2018-01-23

## 2018-01-23 NOTE — TELEPHONE ENCOUNTER
I left a message for Roshni and informed her that her Innatal NIPT results are normal, meaning no aneuploidy was detected for chromosomes 13, 18, 21.  This greatly decreases the chance that the pregnancy is affected with one of these chromosome conditions and makes the likelihood very low.  Roshni opted to not receive information about sex chromosome abnormalities.     The sensitivity of NIPT to detect trisomy 21 is 99%, trisomy 18 is 98%, and trisomy 13 is 90-95%.  The specificity is greater than 99%.  NIPT is a screening test and a normal result does not completely eliminate the chance for a chromosome condition.  Diagnostic testing via amniocentesis is an option to obtain more information if desired.      Plan:  1. Since NIPT results are normal, we do not recommend additional screening tests at this time.  Maternal serum AFP measurement in the second trimester is recommended to check for neural tube defects as well as a level II ultrasound at 18-20 weeks gestation due to advanced maternal age.    2. Roshni was encouraged to call us with any questions about her results.    Malini Jorge  Genetic Counseling Intern

## 2018-01-28 LAB — LAB SCANNED RESULT: NORMAL

## 2018-02-06 ENCOUNTER — PRENATAL OFFICE VISIT (OUTPATIENT)
Dept: OBGYN | Facility: CLINIC | Age: 38
End: 2018-02-06
Payer: COMMERCIAL

## 2018-02-06 VITALS
BODY MASS INDEX: 26.51 KG/M2 | DIASTOLIC BLOOD PRESSURE: 60 MMHG | HEIGHT: 69 IN | WEIGHT: 179 LBS | SYSTOLIC BLOOD PRESSURE: 110 MMHG

## 2018-02-06 DIAGNOSIS — Z34.00 SUPERVISION OF NORMAL FIRST PREGNANCY, ANTEPARTUM: Primary | ICD-10-CM

## 2018-02-06 PROCEDURE — 99000 SPECIMEN HANDLING OFFICE-LAB: CPT | Performed by: OBSTETRICS & GYNECOLOGY

## 2018-02-06 PROCEDURE — 82105 ALPHA-FETOPROTEIN SERUM: CPT | Mod: 90 | Performed by: OBSTETRICS & GYNECOLOGY

## 2018-02-06 PROCEDURE — 36415 COLL VENOUS BLD VENIPUNCTURE: CPT | Performed by: OBSTETRICS & GYNECOLOGY

## 2018-02-06 PROCEDURE — 99207 ZZC PRENATAL VISIT: CPT | Performed by: OBSTETRICS & GYNECOLOGY

## 2018-02-06 NOTE — MR AVS SNAPSHOT
After Visit Summary   2/6/2018    Roshni Oliver    MRN: [id number]           Patient Information     Date Of Birth          1980        Visit Information        Provider Department      2/6/2018 3:30 PM India Best MD [facility]        Today's Diagnoses     Supervision of normal first pregnancy, antepartum    -  1       Follow-ups after your visit        Additional Services     MAT FETAL MED CTR REFERRAL-PREGNANCY       >> Patient may proceed with recommendations for further testing as directed by the Maternal Fetal Medicine Specialist >>    >> If requesting Fetal Echo: MFM will determine appropriate location for exam due to indication.    >> If requesting Lung Maturity Amnio:  If results indicate fetal lung maturity, induction or C/S is recommended within 36 hours.  Please schedule accordingly.     Dear Patient:   Please be aware that coverage of these services is subject to the terms and limitations of your health insurance plan.  Call member services at your health plan with any benefit or coverage questions.      Please bring the following to your appointment:    >>  Any x-rays, CTs or MRIs which have been performed.  Contact the facility where they were done to arrange for  prior to your scheduled appointment.  Any new CT, MRI or other procedures ordered by your specialist must be performed at a [facility] facility or coordinated by your clinic's referral office.  >>  List of current medications   >>  This referral request   >>  Any documents/labs given to you for this referral                  Your next 10 appointments already scheduled     Feb 26, 2018  2:15 PM CST   (Arrive by 2:00 PM)   JAMMIE US COMP with [id number]   [facility] ([facility])    [address]  [facility] [id number] [phone]           Wear comfortable clothes and leave your valuables at home.  "           Feb 26, 2018  2:45 PM CST   Radiology MD with UR JAMMIE HERNANDEZ   [facility] Maternal Fetal Medicine [facility] ([facility])    [address]  [facility] [address]   [phone]           Please arrive at the time given for your first appointment. This visit is used internally to schedule the physician's time during your ultrasound.              Who to contact     If you have questions or need follow up information about today's clinic visit or your schedule please contact [facility] directly at [phone].  Normal or non-critical lab and imaging results will be communicated to you by [employer], letter or phone within 4 business days after the clinic has received the results. If you do not hear from us within 7 days, please contact the clinic through Brand[employer] or phone. If you have a critical or abnormal lab result, we will notify you by phone as soon as possible.  Submit refill requests through [employer] or call your pharmacy and they will forward the refill request to us. Please allow 3 business days for your refill to be completed.          Additional Information About Your Visit        MyChart Information     [employer] gives you secure access to your electronic health record. If you see a primary care provider, you can also send messages to your care team and make appointments. If you have questions, please call your primary care clinic.  If you do not have a primary care provider, please call [phone] and they will assist you.        Care EveryWhere ID     This is your Care EveryWhere ID. This could be used by other organizations to access your [facility] medical records  [id number]        Your Vitals Were     Height Last Period BMI (Body Mass Index)             5' 9\" (1.753 m) 10/14/2017 (Exact Date) 26.43 kg/m2          Blood Pressure from Last 3 Encounters:   02/06/18 110/60   01/03/18 108/70   12/12/17 110/65    Weight from Last 3 Encounters: "   02/06/18 179 lb (81.2 kg)   01/03/18 167 lb (75.8 kg)   12/12/17 184 lb 11.2 oz (83.8 kg)              We Performed the Following     Alpha fetoprotein maternal screen     MAT FETAL MED CTR REFERRAL-PREGNANCY        Primary Care Provider Office Phone # Fax CASEY [place] [phone] [address] [facility] [address]        Equal Access to Services     FLORENCIA BRAVO : Hadii aad ku hadasho Soomaali, waaxda luqadaha, qaybta kaalmada adeegyada, waxay idiin hayaan adeeg kharash la'alissa . So [facility] [phone].    ATENCIÓN: Si habla español, tiene a gonzáles disposición servicios gratuitos de asistencia lingüística. [facility] [phone].    We comply with applicable federal civil rights laws and [place] laws. We do not discriminate on the basis of race, color, national origin, age, disability, sex, sexual orientation, or gender identity.            Thank you!     Thank you for choosing [facility]  for your care. Our goal is always to provide you with excellent care. Hearing back from our patients is one way we can continue to improve our services. Please take a few minutes to complete the written survey that you may receive in the mail after your visit with us. Thank you!             Your Updated Medication List - Protect others around you: Learn how to safely use, store and throw away your medicines at www.disposemymeds.org.          This list is accurate as of 2/6/18  3:50 PM.  Always use your most recent med list.                   Brand Name Dispense Instructions for use Diagnosis    metoclopramide 10 MG tablet    REGLAN    120 tablet    Take 1 tablet (10 mg) by mouth 4 times daily (before meals and nightly)    Hyperemesis gravidarum       omeprazole 40 MG capsule    priLOSEC    90 capsule    Take 1 capsule (40 mg) by mouth daily Take 30-60 minutes before a meal.    Gastroesophageal reflux disease without esophagitis       ondansetron 4 MG ODT tab     ZOFRAN ODT    60 tablet    Take 1-2 tablets (4-8 mg) by mouth every 8 hours as needed for nausea    Hyperemesis gravidarum       prenatal multivitamin plus iron 27-0.8 MG Tabs per tablet      Take 1 tablet by mouth daily

## 2018-02-06 NOTE — PROGRESS NOTES
Had normal first trimester screen so AFP today. Plan level 2 due to AMA. Not going to find out gender. Taking zofran BID and doing well and no Prilosec right now. doing much better. RTC 4 weeks. BE

## 2018-02-09 LAB
# FETUSES US: NORMAL
AFP ADJ MOM AMN: 1.71
AFP SERPL-MCNC: 53 NG/ML
AGE - REPORTED: 36.4 YR
DATING METHOD: NORMAL
DIABETIC AT CONCEPTION: NO
FAMILY MEMBER DISEASES HX: NO
FAMILY MEMBER DISEASES HX: NO
GA METHOD: NORMAL
GA: 16.43 WEEKS
HX OF HEREDITARY DISORDERS: NO
IDDM PATIENT QL: NO
INTEGRATED SCN PATIENT-IMP: NORMAL
LMP START DATE: NORMAL
PREV HX CHROMOSOME ABNORMALITY: NO
SPECIMEN DRAWN SERPL: NORMAL
TWINS: NORMAL

## 2018-02-26 ENCOUNTER — OFFICE VISIT (OUTPATIENT)
Dept: MATERNAL FETAL MEDICINE | Facility: CLINIC | Age: 38
End: 2018-02-26
Attending: OBSTETRICS & GYNECOLOGY
Payer: COMMERCIAL

## 2018-02-26 ENCOUNTER — HOSPITAL ENCOUNTER (OUTPATIENT)
Dept: ULTRASOUND IMAGING | Facility: CLINIC | Age: 38
Discharge: HOME | End: 2018-02-26
Attending: OBSTETRICS & GYNECOLOGY | Admitting: OBSTETRICS & GYNECOLOGY
Payer: COMMERCIAL

## 2018-02-26 DIAGNOSIS — Z36.3 SCREENING, ANTENATAL, FOR MALFORMATION BY ULTRASOUND: ICD-10-CM

## 2018-02-26 DIAGNOSIS — O09.521 ELDERLY MULTIGRAVIDA IN FIRST TRIMESTER: Primary | ICD-10-CM

## 2018-02-26 DIAGNOSIS — O09.521 ELDERLY MULTIGRAVIDA IN FIRST TRIMESTER: ICD-10-CM

## 2018-02-26 PROCEDURE — 76811 OB US DETAILED SNGL FETUS: CPT

## 2018-02-26 NOTE — MR AVS SNAPSHOT
After Visit Summary   2018    Roshni Oliver    MRN: [id number]           Patient Information     Date Of Birth          1980        Visit Information        Provider Department      2018 2:45 PM Jose Alejandro Figueroa MD [facility] Maternal Fetal Medicine [facility]        Today's Diagnoses     Elderly multigravida in first trimester    -  1    Screening, , for malformation by ultrasound           Follow-ups after your visit        Who to contact     If you have questions or need follow up information about today's clinic visit or your schedule please contact [facility] MATERNAL FETAL MEDICINE [facility] directly at [phone].  Normal or non-critical lab and imaging results will be communicated to you by [employer], letter or phone within 4 business days after the clinic has received the results. If you do not hear from us within 7 days, please contact the clinic through [employer] or phone. If you have a critical or abnormal lab result, we will notify you by phone as soon as possible.  Submit refill requests through [employer] or call your pharmacy and they will forward the refill request to us. Please allow 3 business days for your refill to be completed.          Additional Information About Your Visit        MyChart Information     [employer] gives you secure access to your electronic health record. If you see a primary care provider, you can also send messages to your care team and make appointments. If you have questions, please call your primary care clinic.  If you do not have a primary care provider, please call [phone] and they will assist you.        Care EveryWhere ID     This is your Care EveryWhere ID. This could be used by other organizations to access your [place] medical records  [id number]        Your Vitals Were     Last Period                   10/14/2017 (Exact Date)            Blood Pressure from Last 3 Encounters:   18 110/60   18 108/70   17  110/65    Weight from Last 3 Encounters:   02/06/18 81.2 kg (179 lb)   01/03/18 75.8 kg (167 lb)   12/12/17 83.8 kg (184 lb 11.2 oz)              Today, you had the following     No orders found for display       Primary Care Provider Office Phone # Fax #    CASEY Stuart [facility] [phone] [address] [facility] [address]        Equal Access to Services     FLORENCIA BRAVO : Hadii aad ku hadasho Soomaali, waaxda luqadaha, qaybta kaalmada adeegyada, waxay idiin hayaan adeeg khsania cat . So [facility] [phone].    ATENCIÓN: Si habla español, tiene a gonzáles disposición servicios gratuitos de asistencia lingüística. [facility] [phone].    We comply with applicable federal civil rights laws and [place] laws. We do not discriminate on the basis of race, color, national origin, age, disability, sex, sexual orientation, or gender identity.            Thank you!     Thank you for choosing [facility] MATERNAL FETAL MEDICINE [facility]  for your care. Our goal is always to provide you with excellent care. Hearing back from our patients is one way we can continue to improve our services. Please take a few minutes to complete the written survey that you may receive in the mail after your visit with us. Thank you!             Your Updated Medication List - Protect others around you: Learn how to safely use, store and throw away your medicines at www.disposemymeds.org.          This list is accurate as of 2/26/18  3:53 PM.  Always use your most recent med list.                   Brand Name Dispense Instructions for use Diagnosis    metoclopramide 10 MG tablet    REGLAN    120 tablet    Take 1 tablet (10 mg) by mouth 4 times daily (before meals and nightly)    Hyperemesis gravidarum       omeprazole 40 MG capsule    priLOSEC    90 capsule    Take 1 capsule (40 mg) by mouth daily Take 30-60 minutes before a meal.    Gastroesophageal reflux disease without esophagitis       ondansetron 4 MG ODT  tab    ZOFRAN ODT    60 tablet    Take 1-2 tablets (4-8 mg) by mouth every 8 hours as needed for nausea    Hyperemesis gravidarum       prenatal multivitamin plus iron 27-0.8 MG Tabs per tablet      Take 1 tablet by mouth daily

## 2018-04-17 ENCOUNTER — PRENATAL OFFICE VISIT (OUTPATIENT)
Dept: OBGYN | Facility: CLINIC | Age: 38
End: 2018-04-17
Payer: COMMERCIAL

## 2018-04-17 VITALS
HEART RATE: 94 BPM | WEIGHT: 195.8 LBS | DIASTOLIC BLOOD PRESSURE: 61 MMHG | OXYGEN SATURATION: 97 % | SYSTOLIC BLOOD PRESSURE: 109 MMHG | BODY MASS INDEX: 28.91 KG/M2 | TEMPERATURE: 97.9 F

## 2018-04-17 DIAGNOSIS — Z34.00 SUPERVISION OF NORMAL FIRST PREGNANCY, ANTEPARTUM: Primary | ICD-10-CM

## 2018-04-17 LAB
GLUCOSE 1H P 50 G GLC PO SERPL-MCNC: 155 MG/DL (ref 60–129)
HGB BLD-MCNC: 11.8 G/DL (ref 11.7–15.7)

## 2018-04-17 PROCEDURE — 82950 GLUCOSE TEST: CPT | Performed by: OBSTETRICS & GYNECOLOGY

## 2018-04-17 PROCEDURE — 00000218 ZZHCL STATISTIC OBHBG - HEMOGLOBIN: Performed by: OBSTETRICS & GYNECOLOGY

## 2018-04-17 PROCEDURE — 36415 COLL VENOUS BLD VENIPUNCTURE: CPT | Performed by: OBSTETRICS & GYNECOLOGY

## 2018-04-17 PROCEDURE — 86780 TREPONEMA PALLIDUM: CPT | Performed by: OBSTETRICS & GYNECOLOGY

## 2018-04-17 PROCEDURE — 99207 ZZC PRENATAL VISIT: CPT | Performed by: OBSTETRICS & GYNECOLOGY

## 2018-04-17 NOTE — MR AVS SNAPSHOT
After Visit Summary   4/17/2018    Roshni Oliver    MRN: [id number]           Patient Information     Date Of Birth          1980        Visit Information        Provider Department      4/17/2018 2:45 PM India Best MD [facility]        Today's Diagnoses     Supervision of normal first pregnancy, antepartum    -  1       Follow-ups after your visit        Future tests that were ordered for you today     Open Future Orders        Priority Expected Expires Ordered    Anti Treponema Routine  4/17/2019 4/17/2018    Glucose tolerance gest screen 1 hour Routine  4/17/2019 4/17/2018    OB hemoglobin Routine  4/17/2019 4/17/2018            Who to contact     If you have questions or need follow up information about today's clinic visit or your schedule please contact [facility] directly at [phone].  Normal or non-critical lab and imaging results will be communicated to you by MyChart, letter or phone within 4 business days after the clinic has received the results. If you do not hear from us within 7 days, please contact the clinic through MyChart or phone. If you have a critical or abnormal lab result, we will notify you by phone as soon as possible.  Submit refill requests through [employer] or call your pharmacy and they will forward the refill request to us. Please allow 3 business days for your refill to be completed.          Additional Information About Your Visit        MyChart Information     [employer] gives you secure access to your electronic health record. If you see a primary care provider, you can also send messages to your care team and make appointments. If you have questions, please call your primary care clinic.  If you do not have a primary care provider, please call [phone] and they will assist you.        Care EveryWhere ID     This is your Care EveryWhere ID. This could be used by other organizations to access your [facility]  records  [id number]        Your Vitals Were     Pulse Temperature Last Period Pulse Oximetry BMI (Body Mass Index)       94 97.9  F (36.6  C) (Oral) 10/14/2017 (Exact Date) 97% 28.91 kg/m2        Blood Pressure from Last 3 Encounters:   04/17/18 109/61   02/06/18 110/60   01/03/18 108/70    Weight from Last 3 Encounters:   04/17/18 195 lb 12.8 oz (88.8 kg)   02/06/18 179 lb (81.2 kg)   01/03/18 167 lb (75.8 kg)               Primary Care Provider Office Phone # Fax #    Sonia Ash, APRN [facility] [phone] [address] [facility] [address]        Equal Access to Services     FLORENCIA BRAVO : Hussein De La Garza, waaxda luqadaha, qaybta kaalmada adeegyadavid, shan cat . So [facility] [phone].    ATENCIÓN: Si habla español, tiene a gonzáles disposición servicios gratuitos de asistencia lingüística. Mark al [phone].    We comply with applicable federal civil rights laws and [place] laws. We do not discriminate on the basis of race, color, national origin, age, disability, sex, sexual orientation, or gender identity.            Thank you!     Thank you for choosing [facility]  for your care. Our goal is always to provide you with excellent care. Hearing back from our patients is one way we can continue to improve our services. Please take a few minutes to complete the written survey that you may receive in the mail after your visit with us. Thank you!             Your Updated Medication List - Protect others around you: Learn how to safely use, store and throw away your medicines at www.disposemymeds.org.          This list is accurate as of 4/17/18  3:39 PM.  Always use your most recent med list.                   Brand Name Dispense Instructions for use Diagnosis    metoclopramide 10 MG tablet    REGLAN    120 tablet    Take 1 tablet (10 mg) by mouth 4 times daily (before meals and nightly)    Hyperemesis gravidarum        omeprazole 40 MG capsule    priLOSEC    90 capsule    Take 1 capsule (40 mg) by mouth daily Take 30-60 minutes before a meal.    Gastroesophageal reflux disease without esophagitis       ondansetron 4 MG ODT tab    ZOFRAN ODT    60 tablet    Take 1-2 tablets (4-8 mg) by mouth every 8 hours as needed for nausea    Hyperemesis gravidarum       prenatal multivitamin plus iron 27-0.8 MG Tabs per tablet      Take 1 tablet by mouth daily

## 2018-04-17 NOTE — PROGRESS NOTES
GCT today. Off the zofran and has not needed Prilosec. No c/o but feeling lots of movement. Wt gain graph reviewed and slightly ahead but close. RTC 4 weeks and tdap then. BE    Childbirth classes? Think about it  Plan on breastfeeding? Yes  Birthcontrol? oral contraceptive  Gender on ultrasound? did not determine  Circumsion? not sure yet  Peds doc? Peds and young adult medicine

## 2018-04-19 LAB — T PALLIDUM IGG+IGM SER QL: NEGATIVE

## 2018-04-26 DIAGNOSIS — Z34.00 SUPERVISION OF NORMAL FIRST PREGNANCY, ANTEPARTUM: ICD-10-CM

## 2018-04-26 LAB
GLUCOSE 1H P 100 G GLC PO SERPL-MCNC: 128 MG/DL (ref 60–179)
GLUCOSE 2H P 100 G GLC PO SERPL-MCNC: 97 MG/DL (ref 60–154)
GLUCOSE 3H P 100 G GLC PO SERPL-MCNC: 90 MG/DL (ref 60–139)
GLUCOSE P FAST SERPL-MCNC: 83 MG/DL (ref 60–94)

## 2018-04-26 PROCEDURE — 82951 GLUCOSE TOLERANCE TEST (GTT): CPT | Performed by: OBSTETRICS & GYNECOLOGY

## 2018-04-26 PROCEDURE — 82952 GTT-ADDED SAMPLES: CPT | Performed by: OBSTETRICS & GYNECOLOGY

## 2018-04-26 PROCEDURE — 36415 COLL VENOUS BLD VENIPUNCTURE: CPT | Performed by: OBSTETRICS & GYNECOLOGY

## 2018-05-18 ENCOUNTER — PRENATAL OFFICE VISIT (OUTPATIENT)
Dept: OBGYN | Facility: CLINIC | Age: 38
End: 2018-05-18
Payer: COMMERCIAL

## 2018-05-18 VITALS — BODY MASS INDEX: 28.8 KG/M2 | WEIGHT: 195 LBS | SYSTOLIC BLOOD PRESSURE: 118 MMHG | DIASTOLIC BLOOD PRESSURE: 64 MMHG

## 2018-05-18 DIAGNOSIS — Z34.00 SUPERVISION OF NORMAL FIRST PREGNANCY, ANTEPARTUM: Primary | ICD-10-CM

## 2018-05-18 DIAGNOSIS — Z23 NEED FOR TDAP VACCINATION: ICD-10-CM

## 2018-05-18 PROCEDURE — 90471 IMMUNIZATION ADMIN: CPT | Performed by: OBSTETRICS & GYNECOLOGY

## 2018-05-18 PROCEDURE — 99207 ZZC PRENATAL VISIT: CPT | Performed by: OBSTETRICS & GYNECOLOGY

## 2018-05-18 PROCEDURE — 90715 TDAP VACCINE 7 YRS/> IM: CPT | Performed by: OBSTETRICS & GYNECOLOGY

## 2018-05-18 NOTE — PROGRESS NOTES
Has a HA today, otherwise doing okay. Passed all of GTT. Wt gain graph reviewed and now normal. tdap today. RTC 2 weeks. BE

## 2018-05-18 NOTE — MR AVS SNAPSHOT
After Visit Summary   5/18/2018    Roshni Oliver    MRN: [id number]           Patient Information     Date Of Birth          1980        Visit Information        Provider Department      5/18/2018 2:00 PM India Best MD [facility]        Today's Diagnoses     Supervision of normal first pregnancy, antepartum    -  1    Need for Tdap vaccination           Follow-ups after your visit        Your next 10 appointments already scheduled     Jun 05, 2018  2:00 PM CDT   ESTABLISHED PRENATAL with India Best MD   [facility]    [address] [place] [address] [phone]              Who to contact     If you have questions or need follow up information about today's clinic visit or your schedule please contact [facility] directly at [phone].  Normal or non-critical lab and imaging results will be communicated to you by MyChart, letter or phone within 4 business days after the clinic has received the results. If you do not hear from us within 7 days, please contact the clinic through [employer] or phone. If you have a critical or abnormal lab result, we will notify you by phone as soon as possible.  Submit refill requests through [employer] or call your pharmacy and they will forward the refill request to us. Please allow 3 business days for your refill to be completed.          Additional Information About Your Visit        MyChart Information     [employer] gives you secure access to your electronic health record. If you see a primary care provider, you can also send messages to your care team and make appointments. If you have questions, please call your primary care clinic.  If you do not have a primary care provider, please call [phone] and they will assist you.        Care EveryWhere ID     This is your Care EveryWhere ID. This could be used by other organizations to access  your [place] medical records  [id number]        Your Vitals Were     Last Period BMI (Body Mass Index)                10/14/2017 (Exact Date) 28.8 kg/m2           Blood Pressure from Last 3 Encounters:   05/18/18 118/64   04/17/18 109/61   02/06/18 110/60    Weight from Last 3 Encounters:   05/18/18 195 lb (88.5 kg)   04/17/18 195 lb 12.8 oz (88.8 kg)   02/06/18 179 lb (81.2 kg)              We Performed the Following     INJECTION INTRAMUSCULAR OR SUB-Q     TDAP VACCINE (ADACEL)        Primary Care Provider Office Phone # Fax #    Sonia Ash, APRN [facility] [phone] [address] [facility] [address]        Equal Access to Services     FLORENCIA BRAVO : Hadii keren whaleyo Sosummer, waaxda luqadaha, qaybta kaalmada adeegyada, shan cat . So [facility] [phone].    ATENCIÓN: Si habla español, tiene a gonzáles disposición servicios gratuitos de asistencia lingüística. Mark al [phone].    We comply with applicable federal civil rights laws and [place] laws. We do not discriminate on the basis of race, color, national origin, age, disability, sex, sexual orientation, or gender identity.            Thank you!     Thank you for choosing [facility]  for your care. Our goal is always to provide you with excellent care. Hearing back from our patients is one way we can continue to improve our services. Please take a few minutes to complete the written survey that you may receive in the mail after your visit with us. Thank you!             Your Updated Medication List - Protect others around you: Learn how to safely use, store and throw away your medicines at www.disposemymeds.org.          This list is accurate as of 5/18/18  2:20 PM.  Always use your most recent med list.                   Brand Name Dispense Instructions for use Diagnosis    metoclopramide 10 MG tablet    REGLAN    120 tablet    Take 1 tablet (10 mg) by mouth 4 times daily  (before meals and nightly)    Hyperemesis gravidarum       omeprazole 40 MG capsule    priLOSEC    90 capsule    Take 1 capsule (40 mg) by mouth daily Take 30-60 minutes before a meal.    Gastroesophageal reflux disease without esophagitis       ondansetron 4 MG ODT tab    ZOFRAN ODT    60 tablet    Take 1-2 tablets (4-8 mg) by mouth every 8 hours as needed for nausea    Hyperemesis gravidarum       prenatal multivitamin plus iron 27-0.8 MG Tabs per tablet      Take 1 tablet by mouth daily

## 2018-06-05 ENCOUNTER — PRENATAL OFFICE VISIT (OUTPATIENT)
Dept: OBGYN | Facility: CLINIC | Age: 38
End: 2018-06-05
Payer: COMMERCIAL

## 2018-06-05 VITALS
BODY MASS INDEX: 29.62 KG/M2 | SYSTOLIC BLOOD PRESSURE: 110 MMHG | HEIGHT: 69 IN | DIASTOLIC BLOOD PRESSURE: 58 MMHG | WEIGHT: 200 LBS

## 2018-06-05 DIAGNOSIS — Z34.00 SUPERVISION OF NORMAL FIRST PREGNANCY, ANTEPARTUM: Primary | ICD-10-CM

## 2018-06-05 PROCEDURE — 99207 ZZC PRENATAL VISIT: CPT | Performed by: OBSTETRICS & GYNECOLOGY

## 2018-06-05 NOTE — MR AVS SNAPSHOT
After Visit Summary   6/5/2018    Roshni Oliver    MRN: [id number]           Patient Information     Date Of Birth          1980        Visit Information        Provider Department      6/5/2018 3:30 PM India Best MD [facility]        Today's Diagnoses     Supervision of normal first pregnancy, antepartum    -  1       Follow-ups after your visit        Your next 10 appointments already scheduled     Jul 03, 2018 10:30 AM CDT   ESTABLISHED PRENATAL with India eBst MD   [facility]    [address] [place] [address]   [phone]            Jul 10, 2018 10:30 AM CDT   ESTABLISHED PRENATAL with India Best MD   [facility])    [address] [place] [address]   [phone]            Jul 18, 2018 10:30 AM CDT   ESTABLISHED PRENATAL with India Best MD   [facility] ([facility])    [address] [place] [address]   [phone]              Future tests that were ordered for you today     Open Future Orders        Priority Expected Expires Ordered    US OB Single Follow Up Repeat Routine  6/5/2019 6/5/2018            Who to contact     If you have questions or need follow up information about today's clinic visit or your schedule please contact [facility] directly at [phone].  Normal or non-critical lab and imaging results will be communicated to you by MyChart, letter or phone within 4 business days after the clinic has received the results. If you do not hear from us within 7 days, please contact the clinic through [employer] or phone. If you have a critical or abnormal lab result, we will notify you by phone as soon as possible.  Submit refill requests through [employer] or call your pharmacy and they will forward the refill request to us. Please  "allow 3 business days for your refill to be completed.          Additional Information About Your Visit        MyChart Information     [employer] gives you secure access to your electronic health record. If you see a primary care provider, you can also send messages to your care team and make appointments. If you have questions, please call your primary care clinic.  If you do not have a primary care provider, please call [phone] and they will assist you.        Care EveryWhere ID     This is your Care EveryWhere ID. This could be used by other organizations to access your [place] medical records  [id number]        Your Vitals Were     Height Last Period BMI (Body Mass Index)             5' 9\" (1.753 m) 10/14/2017 (Exact Date) 29.53 kg/m2          Blood Pressure from Last 3 Encounters:   06/05/18 110/58   05/18/18 118/64   04/17/18 109/61    Weight from Last 3 Encounters:   06/05/18 200 lb (90.7 kg)   05/18/18 195 lb (88.5 kg)   04/17/18 195 lb 12.8 oz (88.8 kg)               Primary Care Provider Office Phone # Fax #    Sonia Nguyen Ash, APRN [facility] [phone] [address] [facility] [address]        Equal Access to Services     FLORENCIA BRAVO AH: Hadii aad ku hadasho Soomaali, waaxda luqadaha, qaybta kaalmada adeegyada, waxay idiin haysureshn abhinav taylor. So [facility] [phone].    ATENCIÓN: Si habla español, tiene a gonzáles disposición servicios gratuitos de asistencia lingüística. Llame al [phone].    We comply with applicable federal civil rights laws and [place] laws. We do not discriminate on the basis of race, color, national origin, age, disability, sex, sexual orientation, or gender identity.            Thank you!     Thank you for choosing [facility]  for your care. Our goal is always to provide you with excellent care. Hearing back from our patients is one way we can continue to improve our services. Please take a few minutes to complete the written " survey that you may receive in the mail after your visit with us. Thank you!             Your Updated Medication List - Protect others around you: Learn how to safely use, store and throw away your medicines at www.disposemymeds.org.          This list is accurate as of 6/5/18  3:43 PM.  Always use your most recent med list.                   Brand Name Dispense Instructions for use Diagnosis    metoclopramide 10 MG tablet    REGLAN    120 tablet    Take 1 tablet (10 mg) by mouth 4 times daily (before meals and nightly)    Hyperemesis gravidarum       omeprazole 40 MG capsule    priLOSEC    90 capsule    Take 1 capsule (40 mg) by mouth daily Take 30-60 minutes before a meal.    Gastroesophageal reflux disease without esophagitis       ondansetron 4 MG ODT tab    ZOFRAN ODT    60 tablet    Take 1-2 tablets (4-8 mg) by mouth every 8 hours as needed for nausea    Hyperemesis gravidarum       prenatal multivitamin plus iron 27-0.8 MG Tabs per tablet      Take 1 tablet by mouth daily

## 2018-06-21 ENCOUNTER — PRENATAL OFFICE VISIT (OUTPATIENT)
Dept: OBGYN | Facility: CLINIC | Age: 38
End: 2018-06-21
Attending: OBSTETRICS & GYNECOLOGY
Payer: COMMERCIAL

## 2018-06-21 ENCOUNTER — RADIANT APPOINTMENT (OUTPATIENT)
Dept: ULTRASOUND IMAGING | Facility: CLINIC | Age: 38
End: 2018-06-21
Attending: OBSTETRICS & GYNECOLOGY
Payer: COMMERCIAL

## 2018-06-21 VITALS
DIASTOLIC BLOOD PRESSURE: 66 MMHG | WEIGHT: 205 LBS | HEART RATE: 85 BPM | TEMPERATURE: 98.6 F | SYSTOLIC BLOOD PRESSURE: 118 MMHG | BODY MASS INDEX: 30.27 KG/M2

## 2018-06-21 DIAGNOSIS — Z34.00 SUPERVISION OF NORMAL FIRST PREGNANCY, ANTEPARTUM: ICD-10-CM

## 2018-06-21 DIAGNOSIS — Z34.00 SUPERVISION OF NORMAL FIRST PREGNANCY, ANTEPARTUM: Primary | ICD-10-CM

## 2018-06-21 LAB — HGB BLD-MCNC: 11.6 G/DL (ref 11.7–15.7)

## 2018-06-21 PROCEDURE — 99207 ZZC PRENATAL VISIT: CPT | Performed by: OBSTETRICS & GYNECOLOGY

## 2018-06-21 PROCEDURE — 36415 COLL VENOUS BLD VENIPUNCTURE: CPT | Performed by: OBSTETRICS & GYNECOLOGY

## 2018-06-21 PROCEDURE — 00000218 ZZHCL STATISTIC OBHBG - HEMOGLOBIN: Performed by: OBSTETRICS & GYNECOLOGY

## 2018-06-21 PROCEDURE — 76816 OB US FOLLOW-UP PER FETUS: CPT | Performed by: OBSTETRICS & GYNECOLOGY

## 2018-06-21 PROCEDURE — 87653 STREP B DNA AMP PROBE: CPT | Performed by: OBSTETRICS & GYNECOLOGY

## 2018-06-21 NOTE — PROGRESS NOTES
35w5d  Active fetal movement. No leaking or bleeding. No contractions. Legs swollen, stable. Reviewed s/sx pre-eclampsia.  EFW today 2995g (6lb 10oz), 79%, AC>HC. Last baby was about same size. Discussed risk of should dystocia with AC>HC.   GBS collected.   RTC weekly.  Sonia Cline MD

## 2018-06-21 NOTE — MR AVS SNAPSHOT
After Visit Summary   6/21/2018    Roshni Oliver    MRN: [id number]           Patient Information     Date Of Birth          1980        Visit Information        Provider Department      6/21/2018 2:30 PM Sonia Cline MD [facility]        Today's Diagnoses     Supervision of normal first pregnancy, antepartum    -  1       Follow-ups after your visit        Follow-up notes from your care team     Return in about 1 week (around 6/28/2018).      Your next 10 appointments already scheduled     Jul 03, 2018 10:30 AM CDT   ESTABL[facility] PR[facility]ATAL with [facility] Best MD   [facility]    [address] [place] [address]   [phone]            Jul 10, 2018 10:30 AM CDT   ESTABLISHED PRENATAL with India Best MD   [facility]    [address] [facility] [address]   [phone]            Jul 18, 2018 10:30 AM CDT   ESTABLISHED PRENATAL with India Best MD   [facility]    [address] [place] [address]   [phone]              Who to contact     If you have questions or need follow up information about today's clinic visit or your schedule please contact [facility] directly at [phone].  Normal or non-critical lab and imaging results will be communicated to you by MyChart, letter or phone within 4 business days after the clinic has received the results. If you do not hear from us within 7 days, please contact the clinic through MyChart or phone. If you have a critical or abnormal lab result, we will notify you by phone as soon as possible.  Submit refill requests through Lurn[employer] or call your pharmacy and they will forward the refill request to us. Please allow 3 business days for your refill to be completed.          Additional Information About  Your Visit        Trendytahart Information     [employer] gives you secure access to your electronic health record. If you see a primary care provider, you can also send messages to your care team and make appointments. If you have questions, please call your primary care clinic.  If you do not have a primary care provider, please call [phone] and they will assist you.        Care EveryWhere ID     This is your Care EveryWhere ID. This could be used by other organizations to access your [place] medical records  [id number]        Your Vitals Were     Pulse Temperature Last Period BMI (Body Mass Index)          85 98.6  F (37  C) (Oral) 10/14/2017 (Exact Date) 30.27 kg/m2         Blood Pressure from Last 3 Encounters:   06/21/18 118/66   06/05/18 110/58   05/18/18 118/64    Weight from Last 3 Encounters:   06/21/18 205 lb (93 kg)   06/05/18 200 lb (90.7 kg)   05/18/18 195 lb (88.5 kg)              We Performed the Following     OB hemoglobin     Strep, Group B by PCR        Primary Care Provider Office Phone # Fax #    Sonia Nguyen Nilsa, APRN [phone] [address] [facility] [address]        Equal Access to Services     FLORENCIA BRAVO : Hadii keren ku hadasho Soomaali, waaxda luqadaha, qaybta kaalmada adeegyada, shan cat . So [facility] [phone].    ATENCIÓN: Si habla español, tiene a gonzáles disposición servicios gratuitos de asistencia lingüística. Llame al [phone].    We comply with applicable federal civil rights laws and [place] laws. We do not discriminate on the basis of race, color, national origin, age, disability, sex, sexual orientation, or gender identity.            Thank you!     Thank you for choosing [facility]  for your care. Our goal is always to provide you with excellent care. Hearing back from our patients is one way we can continue to improve our services. Please take a few minutes to complete the written survey that  you may receive in the mail after your visit with us. Thank you!             Your Updated Medication List - Protect others around you: Learn how to safely use, store and throw away your medicines at www.disposemymeds.org.          This list is accurate as of 6/21/18  2:45 PM.  Always use your most recent med list.                   Brand Name Dispense Instructions for use Diagnosis    metoclopramide 10 MG tablet    REGLAN    120 tablet    Take 1 tablet (10 mg) by mouth 4 times daily (before meals and nightly)    Hyperemesis gravidarum       omeprazole 40 MG capsule    priLOSEC    90 capsule    Take 1 capsule (40 mg) by mouth daily Take 30-60 minutes before a meal.    Gastroesophageal reflux disease without esophagitis       ondansetron 4 MG ODT tab    ZOFRAN ODT    60 tablet    Take 1-2 tablets (4-8 mg) by mouth every 8 hours as needed for nausea    Hyperemesis gravidarum       prenatal multivitamin plus iron 27-0.8 MG Tabs per tablet      Take 1 tablet by mouth daily

## 2018-06-22 LAB
GP B STREP DNA SPEC QL NAA+PROBE: NEGATIVE
SPECIMEN SOURCE: NORMAL

## 2018-07-03 ENCOUNTER — PRENATAL OFFICE VISIT (OUTPATIENT)
Dept: OBGYN | Facility: CLINIC | Age: 38
End: 2018-07-03
Payer: COMMERCIAL

## 2018-07-03 VITALS — WEIGHT: 204 LBS | DIASTOLIC BLOOD PRESSURE: 80 MMHG | SYSTOLIC BLOOD PRESSURE: 102 MMHG | BODY MASS INDEX: 30.13 KG/M2

## 2018-07-03 DIAGNOSIS — N89.8 VAGINAL ITCHING: ICD-10-CM

## 2018-07-03 DIAGNOSIS — Z34.00 SUPERVISION OF NORMAL FIRST PREGNANCY, ANTEPARTUM: Primary | ICD-10-CM

## 2018-07-03 LAB
SPECIMEN SOURCE: ABNORMAL
WET PREP SPEC: ABNORMAL

## 2018-07-03 PROCEDURE — 99207 ZZC PRENATAL VISIT: CPT | Performed by: OBSTETRICS & GYNECOLOGY

## 2018-07-03 PROCEDURE — 87210 SMEAR WET MOUNT SALINE/INK: CPT | Performed by: OBSTETRICS & GYNECOLOGY

## 2018-07-03 RX ORDER — SODIUM CHLORIDE, SODIUM LACTATE, POTASSIUM CHLORIDE, CALCIUM CHLORIDE 600; 310; 30; 20 MG/100ML; MG/100ML; MG/100ML; MG/100ML
INJECTION, SOLUTION INTRAVENOUS CONTINUOUS
Status: CANCELLED | OUTPATIENT
Start: 2018-07-03

## 2018-07-03 RX ORDER — ONDANSETRON 2 MG/ML
4 INJECTION INTRAMUSCULAR; INTRAVENOUS EVERY 6 HOURS PRN
Status: CANCELLED | OUTPATIENT
Start: 2018-07-03

## 2018-07-03 RX ORDER — IBUPROFEN 200 MG
800 TABLET ORAL
Status: CANCELLED | OUTPATIENT
Start: 2018-07-03

## 2018-07-03 RX ORDER — OXYCODONE AND ACETAMINOPHEN 5; 325 MG/1; MG/1
1 TABLET ORAL
Status: CANCELLED | OUTPATIENT
Start: 2018-07-03

## 2018-07-03 RX ORDER — OXYTOCIN/0.9 % SODIUM CHLORIDE 30/500 ML
100-340 PLASTIC BAG, INJECTION (ML) INTRAVENOUS CONTINUOUS PRN
Status: CANCELLED | OUTPATIENT
Start: 2018-07-03

## 2018-07-03 RX ORDER — CARBOPROST TROMETHAMINE 250 UG/ML
250 INJECTION, SOLUTION INTRAMUSCULAR
Status: CANCELLED | OUTPATIENT
Start: 2018-07-03

## 2018-07-03 RX ORDER — METHYLERGONOVINE MALEATE 0.2 MG/ML
200 INJECTION INTRAVENOUS
Status: CANCELLED | OUTPATIENT
Start: 2018-07-03

## 2018-07-03 RX ORDER — TERCONAZOLE 0.4 %
1 CREAM WITH APPLICATOR VAGINAL AT BEDTIME
Qty: 45 G | Refills: 0 | Status: SHIPPED | OUTPATIENT
Start: 2018-07-03 | End: 2018-07-10

## 2018-07-03 RX ORDER — OXYTOCIN 10 [USP'U]/ML
10 INJECTION, SOLUTION INTRAMUSCULAR; INTRAVENOUS
Status: CANCELLED | OUTPATIENT
Start: 2018-07-03

## 2018-07-03 RX ORDER — ACETAMINOPHEN 325 MG/1
650 TABLET ORAL EVERY 4 HOURS PRN
Status: CANCELLED | OUTPATIENT
Start: 2018-07-03

## 2018-07-03 RX ORDER — NALOXONE HYDROCHLORIDE 0.4 MG/ML
.1-.4 INJECTION, SOLUTION INTRAMUSCULAR; INTRAVENOUS; SUBCUTANEOUS
Status: CANCELLED | OUTPATIENT
Start: 2018-07-03

## 2018-07-03 RX ORDER — LIDOCAINE 40 MG/G
CREAM TOPICAL
Status: CANCELLED | OUTPATIENT
Start: 2018-07-03

## 2018-07-03 NOTE — PROGRESS NOTES
I guess boy. Discussed ultrasound and this one seems smaller than last or about the same--8+ lbs at birth.  Has some vaginal itching so wet prep done showing yeast, script for Terazol done. She is hoping to deliver on due date, not ready yet. RTC weekly until delivery.  BE    GBS: Negative  Hemoglobin   Date Value Ref Range Status   06/21/2018 11.6 (L) 11.7 - 15.7 g/dL Final   ]    Breast pump rx: has one  Labor orders: signed and held  Birth plan: plans epidural  Length of stay: discussed  Disability paperwork: will bring  Resident involvement: discussed and agrees.

## 2018-07-03 NOTE — MR AVS SNAPSHOT
After Visit Summary   7/3/2018    Roshni Oliver    MRN: [id number]           Patient Information     Date Of Birth          1980        Visit Information        Provider Department      7/3/2018 1:00 PM India Best MD [facility]        Today's Diagnoses     Supervision of normal first pregnancy, antepartum    -  1    Vaginal itching           Follow-ups after your visit        Your next 10 appointments already scheduled     Jul 10, 2018 10:30 AM CDT   ESTABLISHED PRENATAL with India Best MD   [facility]    [address] [place] [address]   [phone]            Jul 18, 2018 10:30 AM CDT   ESTABLISHED PRENATAL with India Best MD   [facility] ([facility])    [address] [place] [address]   [phone]              Who to contact     If you have questions or need follow up information about today's clinic visit or your schedule please contact [facility] directly at [phone].  Normal or non-critical lab and imaging results will be communicated to you by MyChart, letter or phone within 4 business days after the clinic has received the results. If you do not hear from us within 7 days, please contact the clinic through [employer] or phone. If you have a critical or abnormal lab result, we will notify you by phone as soon as possible.  Submit refill requests through Aton[employer]s or call your pharmacy and they will forward the refill request to us. Please allow 3 business days for your refill to be completed.          Additional Information About Your Visit        MyChart Information     A[employer]s gives you secure access to your electronic health record. If you see a primary care provider, you can also send messages to your care team and make appointments. If you have questions, please call your primary care clinic.  If you  do not have a primary care provider, please call [phone] and they will assist you.        Care EveryWhere ID     This is your Care EveryWhere ID. This could be used by other organizations to access your [facility] medical records  [id number]        Your Vitals Were     Last Period BMI (Body Mass Index)                10/14/2017 (Exact Date) 30.13 kg/m2           Blood Pressure from Last 3 Encounters:   07/03/18 102/80   06/21/18 118/66   06/05/18 110/58    Weight from Last 3 Encounters:   07/03/18 204 lb (92.5 kg)   06/21/18 205 lb (93 kg)   06/05/18 200 lb (90.7 kg)              We Performed the Following     Wet prep          Today's Medication Changes          These changes are accurate as of 7/3/18  1:25 PM.  If you have any questions, ask your nurse or doctor.               Start taking these medicines.        Dose/Directions    terconazole 0.4 % cream   Commonly known as:  TERAZOL 7   Used for:  Vaginal itching        Dose:  1 applicator   Place 1 applicator vaginally At Bedtime for 7 days   Quantity:  45 g   Refills:  0            Where to get your medicines      These medications were sent to [facility] [address] [place] [address] & [address] [address]     Phone:  [phone]     terconazole 0.4 % cream                Primary Care Provider Office Phone # Fax #    Sonia Nguyen Ash, APRN [facility] [phone]       [facility] [facility] [address]        Equal Access to Services     MARIO BRAVO AH: Hadii keren ku hadasho Soomaali, waaxda luqadaha, qaybta kaalmada adeegyadavid, waxay idiin hayaan adeeg kharash la'aan . So [facility] [phone].    ATENCIÓN: Si habla español, tiene a gonzáles disposición servicios gratuitos de asistencia lingüística. Llame al [phone].    We comply with applicable federal civil rights laws and [place] laws. We do not discriminate on the basis of race, color, national origin, age,  disability, sex, sexual orientation, or gender identity.            Thank you!     Thank you for choosing [facility]  for your care. Our goal is always to provide you with excellent care. Hearing back from our patients is one way we can continue to improve our services. Please take a few minutes to complete the written survey that you may receive in the mail after your visit with us. Thank you!             Your Updated Medication List - Protect others around you: Learn how to safely use, store and throw away your medicines at www.disposemymeds.org.          This list is accurate as of 7/3/18  1:25 PM.  Always use your most recent med list.                   Brand Name Dispense Instructions for use Diagnosis    prenatal multivitamin plus iron 27-0.8 MG Tabs per tablet      Take 1 tablet by mouth daily        terconazole 0.4 % cream    TERAZOL 7    45 g    Place 1 applicator vaginally At Bedtime for 7 days    Vaginal itching

## 2018-07-04 ASSESSMENT — PATIENT HEALTH QUESTIONNAIRE - PHQ9: SUM OF ALL RESPONSES TO PHQ QUESTIONS 1-9: 0

## 2018-07-05 ENCOUNTER — HOSPITAL ENCOUNTER (OUTPATIENT)
Facility: CLINIC | Age: 38
End: 2018-07-05
Payer: COMMERCIAL

## 2018-07-06 ENCOUNTER — HOSPITAL ENCOUNTER (INPATIENT)
Facility: CLINIC | Age: 38
LOS: 3 days | Discharge: HOME | End: 2018-07-09
Attending: OBSTETRICS & GYNECOLOGY | Admitting: OBSTETRICS & GYNECOLOGY
Payer: COMMERCIAL

## 2018-07-06 PROBLEM — Z36.89 ENCOUNTER FOR TRIAGE IN PREGNANT PATIENT: Status: ACTIVE | Noted: 2018-07-06

## 2018-07-06 PROCEDURE — G0463 HOSPITAL OUTPT CLINIC VISIT: HCPCS

## 2018-07-06 PROCEDURE — 86901 BLOOD TYPING SEROLOGIC RH(D): CPT | Performed by: STUDENT IN AN ORGANIZED HEALTH CARE EDUCATION/TRAINING PROGRAM

## 2018-07-06 PROCEDURE — 86850 RBC ANTIBODY SCREEN: CPT | Performed by: STUDENT IN AN ORGANIZED HEALTH CARE EDUCATION/TRAINING PROGRAM

## 2018-07-06 PROCEDURE — 85025 COMPLETE CBC W/AUTO DIFF WBC: CPT | Performed by: STUDENT IN AN ORGANIZED HEALTH CARE EDUCATION/TRAINING PROGRAM

## 2018-07-06 PROCEDURE — 86780 TREPONEMA PALLIDUM: CPT | Performed by: STUDENT IN AN ORGANIZED HEALTH CARE EDUCATION/TRAINING PROGRAM

## 2018-07-06 PROCEDURE — 86900 BLOOD TYPING SEROLOGIC ABO: CPT | Performed by: STUDENT IN AN ORGANIZED HEALTH CARE EDUCATION/TRAINING PROGRAM

## 2018-07-06 PROCEDURE — 12000030 ZZH R&B OB INTERMEDIATE UMMC

## 2018-07-06 RX ORDER — ACETAMINOPHEN 325 MG/1
650 TABLET ORAL EVERY 4 HOURS PRN
Status: DISCONTINUED | OUTPATIENT
Start: 2018-07-06 | End: 2018-07-07

## 2018-07-06 RX ORDER — OXYTOCIN 10 [USP'U]/ML
10 INJECTION, SOLUTION INTRAMUSCULAR; INTRAVENOUS
Status: DISCONTINUED | OUTPATIENT
Start: 2018-07-06 | End: 2018-07-07

## 2018-07-06 RX ORDER — OXYTOCIN/0.9 % SODIUM CHLORIDE 30/500 ML
100-340 PLASTIC BAG, INJECTION (ML) INTRAVENOUS CONTINUOUS PRN
Status: COMPLETED | OUTPATIENT
Start: 2018-07-06 | End: 2018-07-07

## 2018-07-06 RX ORDER — FENTANYL CITRATE 50 UG/ML
50-100 INJECTION, SOLUTION INTRAMUSCULAR; INTRAVENOUS
Status: DISCONTINUED | OUTPATIENT
Start: 2018-07-06 | End: 2018-07-07

## 2018-07-06 RX ORDER — CARBOPROST TROMETHAMINE 250 UG/ML
250 INJECTION, SOLUTION INTRAMUSCULAR
Status: DISCONTINUED | OUTPATIENT
Start: 2018-07-06 | End: 2018-07-07

## 2018-07-06 RX ORDER — METHYLERGONOVINE MALEATE 0.2 MG/ML
200 INJECTION INTRAVENOUS
Status: DISCONTINUED | OUTPATIENT
Start: 2018-07-06 | End: 2018-07-07

## 2018-07-06 RX ORDER — PROCHLORPERAZINE 25 MG
25 SUPPOSITORY, RECTAL RECTAL EVERY 12 HOURS PRN
Status: DISCONTINUED | OUTPATIENT
Start: 2018-07-06 | End: 2018-07-07

## 2018-07-06 RX ORDER — NALOXONE HYDROCHLORIDE 0.4 MG/ML
.1-.4 INJECTION, SOLUTION INTRAMUSCULAR; INTRAVENOUS; SUBCUTANEOUS
Status: DISCONTINUED | OUTPATIENT
Start: 2018-07-06 | End: 2018-07-07

## 2018-07-06 RX ORDER — SODIUM CHLORIDE, SODIUM LACTATE, POTASSIUM CHLORIDE, CALCIUM CHLORIDE 600; 310; 30; 20 MG/100ML; MG/100ML; MG/100ML; MG/100ML
INJECTION, SOLUTION INTRAVENOUS CONTINUOUS
Status: DISCONTINUED | OUTPATIENT
Start: 2018-07-06 | End: 2018-07-07

## 2018-07-06 RX ORDER — ONDANSETRON 2 MG/ML
4 INJECTION INTRAMUSCULAR; INTRAVENOUS EVERY 6 HOURS PRN
Status: DISCONTINUED | OUTPATIENT
Start: 2018-07-06 | End: 2018-07-07

## 2018-07-06 RX ORDER — IBUPROFEN 800 MG/1
800 TABLET, FILM COATED ORAL
Status: COMPLETED | OUTPATIENT
Start: 2018-07-06 | End: 2018-07-07

## 2018-07-06 RX ORDER — OXYCODONE AND ACETAMINOPHEN 5; 325 MG/1; MG/1
1 TABLET ORAL
Status: DISCONTINUED | OUTPATIENT
Start: 2018-07-06 | End: 2018-07-07

## 2018-07-06 NOTE — IP AVS SNAPSHOT
[facility]    [address] [facility] [address]    Phone:  [phone]                                       After Visit Summary   7/6/2018    Roshni Oliver    MRN: [id number]           After Visit Summary Signature Page     I have received my discharge instructions, and my questions have been answered. I have discussed any challenges I see with this plan with the nurse or doctor.    ..........................................................................................................................................  Patient/Patient Representative Signature      ..........................................................................................................................................  Patient Representative Print Name and Relationship to Patient    ..................................................               ................................................  Date                                            Time    ..........................................................................................................................................  Reviewed by Signature/Title    ...................................................              ..............................................  Date                                                            Time

## 2018-07-06 NOTE — IP AVS SNAPSHOT
MRN:[id number]                      After Visit Summary   7/6/2018    Roshni Oliver    MRN: [id number]           Thank you!     Thank you for choosing [facility] for your care. Our goal is always to provide you with excellent care. Hearing back from our patients is one way we can continue to improve our services. Please take a few minutes to complete the written survey that you may receive in the mail after you visit with us. Thank you!        Patient Information     Date Of Birth          1980        About your hospital stay     You were [facility] on:  July 6[facility] 2018 [facility]    You were discharged on:  July 9, 2018        Reason for your hospital stay       Maternity care                  Who to Call     For medical emergencies, please call 911.  For non-urgent questions about your medical care, please call your primary care provider or clinic, [phone]          Attending Provider     Provider Specialty    Sonia Deras MD OB/Gyn       Primary Care Provider Office Phone # Fax #    India Best [phone]      After Care Instructions     Activity       Review discharge instructions            Diet       Resume previous diet            Discharge Instructions - Postpartum visit       Schedule postpartum visit with your provider and return to clinic in 6 weeks.                  Follow-up Appointments     Follow Up and recommended labs and tests       Follow-up in 6 weeks for postpartum care                  Further instructions from your care team       Postpartum Vaginal Delivery Instructions    Activity       Ask family and friends for help when you need it.    Do not place anything in your vagina for 6 weeks.    You are not restricted on other activities, but take it easy for a few weeks to allow your body to recover from delivery.  You are able to do any activities you feel up to that point.    No driving until you have stopped  taking your pain medications (usually two weeks after delivery).     Call your health care provider if you have any of these symptoms:       Increased pain, swelling, redness, or fluid around your stiches from an episiotomy or perineal tear.    A fever above 100.4 F (38 C) with or without chills when placing a thermometer under your tongue.    You soak a sanitary pad with blood within 1 hour, or you see blood clots larger than a golf ball.    Bleeding that lasts more than 6 weeks.    Vaginal discharge that smells bad.    Severe pain, cramping or tenderness in your lower belly area.    A need to urinate more frequently (use the toilet more often), more urgently (use the toilet very quickly), or it burns when you urinate.    Nausea and vomiting.    Redness, swelling or pain around a vein in your leg.    Problems breastfeeding or a red or painful area on your breast.    Chest pain and cough or are gasping for air.    Problems coping with sadness, anxiety, or depression.  If you have any concerns about hurting yourself or the baby, call your provider immediately.     You have questions or concerns after you return home.     Keep your hands clean:  Always wash your hands before touching your perineal area and stitches.  This helps reduce your risk of infection.  If your hands aren't dirty, you may use an alcohol hand-rub to clean your hands. Keep your nails clean and short.        Pending Results     No orders found from 7/4/2018 to 7/7/2018.            Statement of Approval     Ordered          07/09/18 0810  I have reviewed and agree with all the recommendations and orders detailed in this document.  EFFECTIVE NOW     Approved and electronically signed by:  India Best MD             Admission Information     Date & Time Provider Department Dept. Phone    7/6/2018 Sonia Deras MD [facility] [phone]      Your Vitals Were     Blood Pressure Pulse Temperature Respirations Last Period Pulse Oximetry     136/91 67 98.1  F (36.7  C) (Oral) 18 10/14/2017 (Exact Date) 98%      [employer] Information     [employer] gives you secure access to your electronic health record. If you see a primary care provider, you can also send messages to your care team and make appointments. If you have questions, please call your primary care clinic.  If you do not have a primary care provider, please call [phone] and they will assist you.        Care EveryWhere ID     This is your Care EveryWhere ID. This could be used by other organizations to access your [facility] medical records  [id number]        Equal Access to Services     [facility] CenterJA[facility] : Hussein De La Garza, lauren boyce, jan perez, shan cat . So [facility] [phone].    ATENCIÓN: Si habla español, tiene a gonzáles disposición servicios gratuitos de asistencia lingüística. LlFort Hamilton Hospital [phone].    We comply with applicable federal civil rights laws and [place] laws. We do not discriminate on the basis of race, color, national origin, age, disability, sex, sexual orientation, or gender identity.               Review of your medicines      START taking        Dose / Directions    acetaminophen 325 MG tablet   Commonly known as:  TYLENOL        Dose:  650 mg   Take 2 tablets (650 mg) by mouth every 4 hours as needed for mild pain or fever   Quantity:  30 tablet   Refills:  0       ibuprofen 400 MG tablet   Commonly known as:  ADVIL/MOTRIN        Dose:  400 mg   Take 1 tablet (400 mg) by mouth every 6 hours as needed for other (cramping)   Quantity:  30 tablet   Refills:  0       senna-docusate 8.6-50 MG per tablet   Commonly known as:  SENOKOT-S;PERICOLACE        Dose:  1 tablet   Take 1 tablet by mouth 2 times daily   Quantity:  40 tablet   Refills:  0         CONTINUE these medicines which have NOT CHANGED        Dose / Directions    prenatal multivitamin plus iron 27-0.8 MG Tabs per tablet   Indication:  Pregnancy         Dose:  1 tablet   Take 1 tablet by mouth daily   Refills:  0       terconazole 0.4 % cream   Commonly known as:  TERAZOL 7   Used for:  Vaginal itching        Dose:  1 applicator   Place 1 applicator vaginally At Bedtime for 7 days   Quantity:  45 g   Refills:  0            Where to get your medicines      These medications were sent to [facility] [place], [place] - [address]  [address] [facility] [address], [facility] [address]     Phone:  [phone]     acetaminophen 325 MG tablet    ibuprofen 400 MG tablet    senna-docusate 8.6-50 MG per tablet                Protect others around you: Learn how to safely use, store and throw away your medicines at www.disposemymeds.org.             Medication List: This is a list of all your medications and when to take them. Check marks below indicate your daily home schedule. Keep this list as a reference.      Medications           Morning Afternoon Evening Bedtime As Needed    acetaminophen 325 MG tablet   Commonly known as:  TYLENOL   Take 2 tablets (650 mg) by mouth every 4 hours as needed for mild pain or fever   Last time this was given:  650 mg on 7/8/2018  2:51 AM                                ibuprofen 400 MG tablet   Commonly known as:  ADVIL/MOTRIN   Take 1 tablet (400 mg) by mouth every 6 hours as needed for other (cramping)   Last time this was given:  800 mg on 7/9/2018  4:13 AM                                prenatal multivitamin plus iron 27-0.8 MG Tabs per tablet   Take 1 tablet by mouth daily                                senna-docusate 8.6-50 MG per tablet   Commonly known as:  SENOKOT-S;PERICOLACE   Take 1 tablet by mouth 2 times daily   Last time this was given:  1 tablet on 7/9/2018  7:38 AM                                terconazole 0.4 % cream   Commonly known as:  TERAZOL 7   Place 1 applicator vaginally At Bedtime for 7 days

## 2018-07-07 ENCOUNTER — ANESTHESIA EVENT (OUTPATIENT)
Dept: OBGYN | Facility: CLINIC | Age: 38
End: 2018-07-07
Payer: COMMERCIAL

## 2018-07-07 ENCOUNTER — ANESTHESIA (OUTPATIENT)
Dept: OBGYN | Facility: CLINIC | Age: 38
End: 2018-07-07
Payer: COMMERCIAL

## 2018-07-07 LAB
ABO + RH BLD: NORMAL
ABO + RH BLD: NORMAL
BASOPHILS # BLD AUTO: 0 10E9/L (ref 0–0.2)
BASOPHILS NFR BLD AUTO: 0.2 %
BLD GP AB SCN SERPL QL: NORMAL
BLOOD BANK CMNT PATIENT-IMP: NORMAL
DIFFERENTIAL METHOD BLD: ABNORMAL
EOSINOPHIL # BLD AUTO: 0.1 10E9/L (ref 0–0.7)
EOSINOPHIL NFR BLD AUTO: 0.9 %
ERYTHROCYTE [DISTWIDTH] IN BLOOD BY AUTOMATED COUNT: 12.9 % (ref 10–15)
HCT VFR BLD AUTO: 35.5 % (ref 35–47)
HGB BLD-MCNC: 11.2 G/DL (ref 11.7–15.7)
IMM GRANULOCYTES # BLD: 0 10E9/L (ref 0–0.4)
IMM GRANULOCYTES NFR BLD: 0.2 %
LYMPHOCYTES # BLD AUTO: 1.9 10E9/L (ref 0.8–5.3)
LYMPHOCYTES NFR BLD AUTO: 17 %
MCH RBC QN AUTO: 26.8 PG (ref 26.5–33)
MCHC RBC AUTO-ENTMCNC: 31.5 G/DL (ref 31.5–36.5)
MCV RBC AUTO: 85 FL (ref 78–100)
MONOCYTES # BLD AUTO: 0.8 10E9/L (ref 0–1.3)
MONOCYTES NFR BLD AUTO: 6.9 %
NEUTROPHILS # BLD AUTO: 8.2 10E9/L (ref 1.6–8.3)
NEUTROPHILS NFR BLD AUTO: 74.8 %
NRBC # BLD AUTO: 0 10*3/UL
NRBC BLD AUTO-RTO: 0 /100
PLATELET # BLD AUTO: 247 10E9/L (ref 150–450)
RBC # BLD AUTO: 4.18 10E12/L (ref 3.8–5.2)
SPECIMEN EXP DATE BLD: NORMAL
T PALLIDUM AB SER QL: NONREACTIVE
WBC # BLD AUTO: 10.9 10E9/L (ref 4–11)

## 2018-07-07 PROCEDURE — 12000030 ZZH R&B OB INTERMEDIATE UMMC

## 2018-07-07 PROCEDURE — 25000128 H RX IP 250 OP 636: Performed by: STUDENT IN AN ORGANIZED HEALTH CARE EDUCATION/TRAINING PROGRAM

## 2018-07-07 PROCEDURE — 00HU33Z INSERTION OF INFUSION DEVICE INTO SPINAL CANAL, PERCUTANEOUS APPROACH: ICD-10-PCS | Performed by: ANESTHESIOLOGY

## 2018-07-07 PROCEDURE — 3E0R3BZ INTRODUCTION OF ANESTHETIC AGENT INTO SPINAL CANAL, PERCUTANEOUS APPROACH: ICD-10-PCS | Performed by: ANESTHESIOLOGY

## 2018-07-07 PROCEDURE — 72200001 ZZH LABOR CARE VAGINAL DELIVERY SINGLE

## 2018-07-07 PROCEDURE — 59400 OBSTETRICAL CARE: CPT | Mod: GC | Performed by: OBSTETRICS & GYNECOLOGY

## 2018-07-07 PROCEDURE — 25000125 ZZHC RX 250: Performed by: ANESTHESIOLOGY

## 2018-07-07 PROCEDURE — 25000125 ZZHC RX 250

## 2018-07-07 PROCEDURE — 25000132 ZZH RX MED GY IP 250 OP 250 PS 637: Performed by: STUDENT IN AN ORGANIZED HEALTH CARE EDUCATION/TRAINING PROGRAM

## 2018-07-07 PROCEDURE — 25000128 H RX IP 250 OP 636: Performed by: ANESTHESIOLOGY

## 2018-07-07 RX ORDER — AMOXICILLIN 250 MG
2 CAPSULE ORAL 2 TIMES DAILY
Status: DISCONTINUED | OUTPATIENT
Start: 2018-07-07 | End: 2018-07-09 | Stop reason: HOSPADM

## 2018-07-07 RX ORDER — AMOXICILLIN 250 MG
1 CAPSULE ORAL 2 TIMES DAILY
Status: DISCONTINUED | OUTPATIENT
Start: 2018-07-07 | End: 2018-07-09 | Stop reason: HOSPADM

## 2018-07-07 RX ORDER — AMOXICILLIN 250 MG
1 CAPSULE ORAL 2 TIMES DAILY
Qty: 40 TABLET | Refills: 0 | Status: SHIPPED | OUTPATIENT
Start: 2018-07-07 | End: 2018-08-21

## 2018-07-07 RX ORDER — OXYTOCIN/0.9 % SODIUM CHLORIDE 30/500 ML
PLASTIC BAG, INJECTION (ML) INTRAVENOUS
Status: COMPLETED
Start: 2018-07-07 | End: 2018-07-07

## 2018-07-07 RX ORDER — BISACODYL 10 MG
10 SUPPOSITORY, RECTAL RECTAL DAILY PRN
Status: DISCONTINUED | OUTPATIENT
Start: 2018-07-09 | End: 2018-07-09 | Stop reason: HOSPADM

## 2018-07-07 RX ORDER — NALBUPHINE HYDROCHLORIDE 10 MG/ML
2.5-5 INJECTION, SOLUTION INTRAMUSCULAR; INTRAVENOUS; SUBCUTANEOUS EVERY 6 HOURS PRN
Status: DISCONTINUED | OUTPATIENT
Start: 2018-07-07 | End: 2018-07-07

## 2018-07-07 RX ORDER — OXYTOCIN 10 [USP'U]/ML
10 INJECTION, SOLUTION INTRAMUSCULAR; INTRAVENOUS
Status: DISCONTINUED | OUTPATIENT
Start: 2018-07-07 | End: 2018-07-09 | Stop reason: HOSPADM

## 2018-07-07 RX ORDER — OXYTOCIN/0.9 % SODIUM CHLORIDE 30/500 ML
340 PLASTIC BAG, INJECTION (ML) INTRAVENOUS CONTINUOUS PRN
Status: DISCONTINUED | OUTPATIENT
Start: 2018-07-07 | End: 2018-07-09 | Stop reason: HOSPADM

## 2018-07-07 RX ORDER — ACETAMINOPHEN 325 MG/1
650 TABLET ORAL EVERY 4 HOURS PRN
Qty: 30 TABLET | Refills: 0 | Status: SHIPPED | OUTPATIENT
Start: 2018-07-07 | End: 2018-08-21

## 2018-07-07 RX ORDER — IBUPROFEN 400 MG/1
400 TABLET, FILM COATED ORAL EVERY 6 HOURS PRN
Qty: 30 TABLET | Refills: 0 | Status: SHIPPED | OUTPATIENT
Start: 2018-07-07 | End: 2018-08-21

## 2018-07-07 RX ORDER — MISOPROSTOL 200 UG/1
400 TABLET ORAL
Status: DISCONTINUED | OUTPATIENT
Start: 2018-07-07 | End: 2018-07-09 | Stop reason: HOSPADM

## 2018-07-07 RX ORDER — NALOXONE HYDROCHLORIDE 0.4 MG/ML
.1-.4 INJECTION, SOLUTION INTRAMUSCULAR; INTRAVENOUS; SUBCUTANEOUS
Status: DISCONTINUED | OUTPATIENT
Start: 2018-07-07 | End: 2018-07-07

## 2018-07-07 RX ORDER — MISOPROSTOL 200 UG/1
TABLET ORAL
Status: DISCONTINUED
Start: 2018-07-07 | End: 2018-07-07 | Stop reason: WASHOUT

## 2018-07-07 RX ORDER — OXYTOCIN 10 [USP'U]/ML
INJECTION, SOLUTION INTRAMUSCULAR; INTRAVENOUS
Status: DISCONTINUED
Start: 2018-07-07 | End: 2018-07-07 | Stop reason: WASHOUT

## 2018-07-07 RX ORDER — EPHEDRINE SULFATE 50 MG/ML
5 INJECTION, SOLUTION INTRAMUSCULAR; INTRAVENOUS; SUBCUTANEOUS
Status: DISCONTINUED | OUTPATIENT
Start: 2018-07-07 | End: 2018-07-07

## 2018-07-07 RX ORDER — LIDOCAINE HYDROCHLORIDE 10 MG/ML
INJECTION, SOLUTION INFILTRATION; PERINEURAL
Status: DISCONTINUED
Start: 2018-07-07 | End: 2018-07-07 | Stop reason: WASHOUT

## 2018-07-07 RX ORDER — IBUPROFEN 400 MG/1
800 TABLET, FILM COATED ORAL EVERY 6 HOURS PRN
Status: DISCONTINUED | OUTPATIENT
Start: 2018-07-07 | End: 2018-07-09 | Stop reason: HOSPADM

## 2018-07-07 RX ORDER — OXYTOCIN/0.9 % SODIUM CHLORIDE 30/500 ML
100 PLASTIC BAG, INJECTION (ML) INTRAVENOUS CONTINUOUS
Status: DISCONTINUED | OUTPATIENT
Start: 2018-07-07 | End: 2018-07-09 | Stop reason: HOSPADM

## 2018-07-07 RX ORDER — HYDROCORTISONE 2.5 %
CREAM (GRAM) TOPICAL 3 TIMES DAILY PRN
Status: DISCONTINUED | OUTPATIENT
Start: 2018-07-07 | End: 2018-07-09 | Stop reason: HOSPADM

## 2018-07-07 RX ORDER — NALOXONE HYDROCHLORIDE 0.4 MG/ML
.1-.4 INJECTION, SOLUTION INTRAMUSCULAR; INTRAVENOUS; SUBCUTANEOUS
Status: DISCONTINUED | OUTPATIENT
Start: 2018-07-07 | End: 2018-07-09 | Stop reason: HOSPADM

## 2018-07-07 RX ORDER — LANOLIN 100 %
OINTMENT (GRAM) TOPICAL
Status: DISCONTINUED | OUTPATIENT
Start: 2018-07-07 | End: 2018-07-09 | Stop reason: HOSPADM

## 2018-07-07 RX ORDER — ACETAMINOPHEN 325 MG/1
650 TABLET ORAL EVERY 4 HOURS PRN
Status: DISCONTINUED | OUTPATIENT
Start: 2018-07-07 | End: 2018-07-09 | Stop reason: HOSPADM

## 2018-07-07 RX ADMIN — SENNOSIDES AND DOCUSATE SODIUM 1 TABLET: 8.6; 5 TABLET ORAL at 20:23

## 2018-07-07 RX ADMIN — Medication 340 ML/HR: at 02:37

## 2018-07-07 RX ADMIN — Medication 5 ML: at 01:08

## 2018-07-07 RX ADMIN — LIDOCAINE HYDROCHLORIDE,EPINEPHRINE BITARTRATE 3 ML: 15; .005 INJECTION, SOLUTION EPIDURAL; INFILTRATION; INTRACAUDAL; PERINEURAL at 01:05

## 2018-07-07 RX ADMIN — Medication: at 00:23

## 2018-07-07 RX ADMIN — IBUPROFEN 800 MG: 400 TABLET ORAL at 14:17

## 2018-07-07 RX ADMIN — Medication 5 ML: at 01:11

## 2018-07-07 RX ADMIN — IBUPROFEN 800 MG: 400 TABLET ORAL at 20:21

## 2018-07-07 RX ADMIN — OXYTOCIN-SODIUM CHLORIDE 0.9% IV SOLN 30 UNIT/500ML 340 ML/HR: 30-0.9/5 SOLUTION at 02:37

## 2018-07-07 RX ADMIN — Medication 5 ML: at 00:30

## 2018-07-07 RX ADMIN — LIDOCAINE HYDROCHLORIDE,EPINEPHRINE BITARTRATE 3 ML: 15; .005 INJECTION, SOLUTION EPIDURAL; INFILTRATION; INTRACAUDAL; PERINEURAL at 00:23

## 2018-07-07 RX ADMIN — SENNOSIDES AND DOCUSATE SODIUM 1 TABLET: 8.6; 5 TABLET ORAL at 09:12

## 2018-07-07 RX ADMIN — SODIUM CHLORIDE, POTASSIUM CHLORIDE, SODIUM LACTATE AND CALCIUM CHLORIDE: 600; 310; 30; 20 INJECTION, SOLUTION INTRAVENOUS at 01:02

## 2018-07-07 RX ADMIN — Medication 5 ML: at 00:26

## 2018-07-07 RX ADMIN — IBUPROFEN 800 MG: 800 TABLET ORAL at 03:33

## 2018-07-07 RX ADMIN — SODIUM CHLORIDE, POTASSIUM CHLORIDE, SODIUM LACTATE AND CALCIUM CHLORIDE 1000 ML: 600; 310; 30; 20 INJECTION, SOLUTION INTRAVENOUS at 00:00

## 2018-07-07 NOTE — PROGRESS NOTES
Post Partum Progress Note    Subjective:  She is resting comfortably in bed this morning.  Has no complaints. Pain is improving and well controlled on current medication regimen. Tolerating PO intake.  Lochia present and similar to a normal period.  Voiding without difficulty.  Passing flatus and had a BM yesterday. Ambulating without dizziness or difficulty.  She denies headache, changes in vision, nausea/vomiting, chest pain, shortness of breath, RUQ pain, or worsening edema.  Is breastfeeding. Has noted good latch, but nipples are slightly sore.    Objective:  Vitals:    18 0425 18 0440 18 0540 18 0900   BP: 109/59 109/62 112/64 123/79   Resp:   16 16   Temp:   98.4  F (36.9  C) 97.6  F (36.4  C)   TempSrc:   Oral Oral   SpO2: 98%          General: NAD. A&Ox3.  CV: RRR.  Pulm: CTAB. Normal respiratory effort.  Abd: Soft, non-tender, non-distended. Fundus is firm and below the umbilicus.    Ext: 1-2+ edema. No calf tenderness.    # Pain Assessment:  Current Pain Score 2018   Patient currently in pain? denies   Pain location -   Pain descriptors -   Roshni cho pain level was assessed and she currently denies pain.        Assessment/Plan:  Roshni Oliver is a 38 year old  female who is PPD#1 s/p  @ 38w0d. Patient doing well postpartum and meeting goals.    - Encourage routine post-operative goals including ambulation  - PNC: Rh +. Rubella immune. No intervention indicated.  - Pain: controlled on oral medications  - Heme: Hgb 11.2> > am pending.  If <10 will discharge home with iron.  - Endocrine: , GTT 83/128/97/90  - GI: continue anti-emetics and stool softeners as needed.  - :Voiding spontaneously.  - Infant: Stable in room with mom  - Feeding: Is breastfeeding with good latch, some nipple soreness  - BC: Considering POP at postpartum visit    Disposition: Patient desires discharge to home tomorrow on PPD#2    Segun Porras MD  OB/GYN Resident,  PGY-1  7/7/2018 1:53 PM

## 2018-07-07 NOTE — PLAN OF CARE
Problem: Patient Care Overview  Goal: Plan of Care/Patient Progress Review  Outcome: Improving  Data: Vital signs within normal limits. Postpartum checks within normal limits - see flow record. Straight cathed down in labor before transfer - not able to spontaneously void yet, bleeding minimal and uterus firm at U. Patient eating and drinking normally. Patient is up ambulating with stand by. No apparent signs of infection. Patient performing self cares and is able to care for infant.  Pain well controlled with ibuprofen. Given bath salts for tub soak later today.   Response: Positive attachment behaviors observed with infant.   Will continue to monitor and provide support.

## 2018-07-07 NOTE — H&P
[facility]  OB History and Physical      Roshni Oliver MRN# [id number]   Age: 38 year old YOB: 1980     CC:  Labor evaluation    HPI:  Ms. Roshni Oliver is a 38 year old  at 37w6d by LMP c/w 9w3d US, who presents for evaluation of labor. She is accompanied by her partner, who is at the bedside and supportive.   She reports contractions started around 6pm. She denies vaginal bleeding, and loss of fluid.   + normal fetal movement. She desires an epidural for pain control.     Pregnancy Complications:  - AMA  - Failed GCT passed GTT      Prenatal Labs:   Lab Results   Component Value Date    ABO A 2017    RH Pos 2017    AS Neg 2017    HEPBANG Nonreactive 2017    CHPCRT  07/10/2012     Negative for C. trachomatis rRNA by transcription mediated amplification.   A negative result by transcription mediated amplification does not preclude the   presence of C. trachomatis infection because results are dependent on proper   and adequate collection, absence of inhibitors, and sufficient rRNA to be   detected.    GCPCRT  07/10/2012     Negative for N. gonorrhoeae rRNA by transcription mediated amplification.   A negative result by transcription mediated amplification does not preclude the   presence of N. gonorrhoeae infection because results are dependent on proper   and adequate collection, absence of inhibitors, and sufficient rRNA to be   detected.    TREPAB Negative 2018    HGB 11.6 (L) 2018    HIV Negative 07/10/2012       GBS Status:   Lab Results   Component Value Date    GBS Negative 2018       OB History  Obstetric History       T1      L1     SAB1   TAB0   Ectopic0   Multiple0   Live Births1       # Outcome Date GA Lbr Yevgeniy/2nd Weight Sex Delivery Anes PTL Lv   3 Current            2 SAB 17 9w0d          1 Term /16 40w3d 04:15 / 01:52 3.941 kg (8 lb 11 oz) F Vag-Spont EPI  ANNETTE      Name: [place]      Apgar1:  9                 Apgar5: 9          PMHx:   Past Medical History:   Diagnosis Date     UTI (urinary tract infection)      PSHx:   Past Surgical History:   Procedure Laterality Date     DENTAL SURGERY      wisdom teeth     HERNIA REPAIR, UMBILICAL  3 y/o,      Meds:   No current outpatient prescriptions on file.      Allergies:    Allergies   Allergen Reactions     Sulfa Drugs Itching     Itching behind ears      FmHx:   Family History   Problem Relation Age of Onset     Depression Mother      Depression Father      HEART DISEASE Maternal Grandmother        ROS:   Complete 10-point ROS negative except as noted in HPI.She denies headache, blurry vision, chest pain, shortness of breath, RUQ pain, nausea, vomiting, dysuria, hematuria or extremity edema.    PE:  Vit:   Patient Vitals for the past 4 hrs:   BP Temp Temp src   18 2124 - 98.7  F (37.1  C) Oral   18 2120 131/75 - -      Gen: Well-appearing, NAD, comfortable   CV: rrr, no mrg   Pulm: Ctab, no wheezes or crackles   Abd: Soft, gravid, non-tender  Ext: 1+ LE edema b/l  Cx: 3/90/-2 (@2147) with change to 6-7 cm with a BBOW per RN check, 90 minutes between checks    Pres:  cephalic by BSUS  EFW:  8# by leopolds  Memb: intact              FHT: Baseline 120, moderate variability, + accelerations, no decelerations   [place]: 2-3 contractions in 10 minutes      Assessment  Ms. Roshni Oliver is a 38 year old  at 37w6d by LMP c/w 9w3d US, who presents in spontaneous labor.     Plan  Spontaneous labor:  Admit to L&D  - CBC, type and screen, RPR ordered  - GBS negative, no PCN indicated  - desires epidural  - Anticipate     FWB:   - Category 1 FHT.  Continue EFM and toco    PNC:   - Rh positive, Rubella immune, GBS negative,  GTT 83/128/97/90, Placenta anterior      # Pain Assessment:  Current Pain Score 2018   Patient currently in pain? yes   Pain location Abdomen   Pain descriptors [place] cho pain level was assessed and  she currently has pain due to contractions. No pain medications indicated at this time.     The patient was discussed with Dr. Deras who is in agreement with the treatment plan.    Bettye Herman MD PhD  Ob/Gyn PGY-3  7/6/2018 11:48 PM

## 2018-07-07 NOTE — DISCHARGE SUMMARY
[facility] Discharge Summary    Roshni Oliver MRN# [id number]   Age: 38 year old YOB: 1980     Date of Admission:  2018  Date of Discharge:  2018  Admitting Physician:  Sonia Deras MD  Discharge Physician:  India Best MD    Admit Dx:   -  at 38w0d   - AMA  - Failed GCT passed GTT    Discharge Dx:  - Same as above, s/p     Procedures:  - Spontaneous vaginal delivery  - Epidural analgesia    Admit HPI/Labor Course:  Roshni Oliver is a now  who was admitted in spontaneous labor. She progressed to full dilation and SROM with clear fluid. She delivered a vigorous female infant on 2018 at 0236 with APGARs 9/9 at 1 and 5 minutes, weight pending. Cephalic presentation with direct OA position. Infant delivered easily. No nuchal cord. Placenta delivered intact with 3-vessel cord. A 1st degree laceration was present. It was hemostatic and was not repaired. Fundus was firm with pitocin running through the IV.  ml. Dr. Deras was present for the entire delivery.      Please see her Admission H&P and Delivery Summary for further details.    Postpartum Course:  Her postpartum course was uncomplicated. On PPD#2, she was meeting all of her postpartum goals and deemed stable for discharge. She was voiding without difficulty, tolerating a regular diet without nausea and vomiting, her pain was well controlled on oral pain medicines and her lochia was appropriate. Her hemoglobin prior to delivery was 11.2 and after delivery was 11.1. Her Rh status was +, and Rhogam was not indicated.     Discharge Medications:  Current Discharge Medication List      START taking these medications    Details   acetaminophen (TYLENOL) 325 MG tablet Take 2 tablets (650 mg) by mouth every 4 hours as needed for mild pain or fever  Qty: 30 tablet, Refills: 0    Associated Diagnoses:  (normal spontaneous vaginal delivery)      ibuprofen (ADVIL/MOTRIN) 400 MG  tablet Take 1 tablet (400 mg) by mouth every 6 hours as needed for other (cramping)  Qty: 30 tablet, Refills: 0    Associated Diagnoses:  (normal spontaneous vaginal delivery)      senna-docusate (SENOKOT-S;PERICOLACE) 8.6-50 MG per tablet Take 1 tablet by mouth 2 times daily  Qty: 40 tablet, Refills: 0    Associated Diagnoses:  (normal spontaneous vaginal delivery)         CONTINUE these medications which have NOT CHANGED    Details   Prenatal Vit-Fe Fumarate-FA (PRENATAL MULTIVITAMIN  PLUS IRON) 27-0.8 MG TABS Take 1 tablet by mouth daily      terconazole (TERAZOL 7) 0.4 % cream Place 1 applicator vaginally At Bedtime for 7 days  Qty: 45 g, Refills: 0    Associated Diagnoses: Vaginal itching             Discharge/Disposition:  Roshni Oliver was discharged to home in stable condition with the following instructions/medications:  1) Call for temperature > 100.4, bright red vaginal bleeding >1 pad an hour x 2 hours, foul smelling vaginal discharge, pain not controlled by usual oral pain meds, persistent nausea and vomiting not controlled on medications  2) Contraception: planning to start POP at 6w PP visit  3) For feeding she decided to breastfeed.  4) She was instructed to follow-up with her primary OB in 6 weeks for a routine postpartum visit.    # Discharge Pain Plan:   - During her hospitalization, Roshni experienced pain due to .  The pain plan for discharge was discussed with Roshni and the plan was created in a collaborative fashion.    - Pharmacologic adjuvants:  NSAIDs and Acetaminophen    Fouzia Galicia MD   OB/GYN Resident PGY-2  2018 6:58 AM

## 2018-07-07 NOTE — PLAN OF CARE
Problem: Patient Care Overview  Goal: Plan of Care/Patient Progress Review  Outcome: Improving  Pt vitals & assessments are stable. Voiding large amounts. Denies pain at present. PP assessments are within normal limits. Breast feeding well & independent. Positive bonding with her infant observed.

## 2018-07-07 NOTE — PLAN OF CARE
Problem: Patient Care Overview  Goal: Plan of Care/Patient Progress Review  Outcome: Improving  Pt stable, VS WDL. Pt laboring requesting epidural upon admission. Anesthesia at bedside for epidural placement; following initial placement, pt received no pain relief; Dr. Torres called back to reassess and epidural replaced. Pt comfortable following second epidural. FHR intermittent variable decels with one prolonged decel that resolved with repositioning and IV fluid bolus - SVE shortly after PD 10 cm dilated w/ BBOW. Plan to allow FHR to recover prior to AROM. SROM at 0200, clear fluid. Dr. Deras at bedside for SVE at 0230, 10/100/+2. Started pushing at 0230,  at 0236 baby girl, see delivery note.     ml, fundus firm, midline, u/1-2, small amounts of bleeding. Report given to Tiffanie ESPARZA at 0335.

## 2018-07-07 NOTE — ANESTHESIA PREPROCEDURE EVALUATION
Anesthesia Evaluation     .      No history of anesthetic complications          ROS/MED HX    ENT/Pulmonary:  - neg pulmonary ROS     Neurologic:  - neg neurologic ROS     Cardiovascular:  - neg cardiovascular ROS       METS/Exercise Tolerance:     Hematologic:         Musculoskeletal:         GI/Hepatic:  - neg GI/hepatic ROS       Renal/Genitourinary:         Endo:         Psychiatric:         Infectious Disease:         Malignancy:         Other:                     Physical Exam  Normal systems: cardiovascular and dental    Airway   Mallampati: II  TM distance: > 3 FB    Dental     Cardiovascular       Pulmonary           neg OB ROS                 Anesthesia Plan      History & Physical Review      ASA Status:  .  OB Epidural Asa: 2 and emergent            Postoperative Care      Consents  Anesthetic plan, risks, benefits and alternatives discussed with:  Patient..

## 2018-07-07 NOTE — PLAN OF CARE
Problem: Patient Care Overview  Goal: Plan of Care/Patient Progress Review  Outcome: Improving  Stable patient who complains of on and off cramping when nursing. She is taking motrin which she claimed is helping. Offered heat pack as well and she'll  call if she needs one. Breastfeeding independently on demand. Will continue with plan of care.

## 2018-07-07 NOTE — PLAN OF CARE
Problem: Patient Care Overview  Goal: Plan of Care/Patient Progress Review  Outcome: Improving  Patient arrived to [facility] unit via wheelchair at 0530,with belongings, accompanied by spouse/ significant other, with infant in arms. Received report from ISAIAS Moreno and checked bands. Unit and room orientation completd. Call light given and within arms reach; no concerns present at this time. Continue with plan of care.

## 2018-07-07 NOTE — PROVIDER NOTIFICATION
07/07/18 0046   Provider Notification   Provider Name/Title Dr. Torres   Method of Notification At Bedside   Notification Reason Pain   Dr. Torres at bedside for evaluation after patient not receiving any pain relief from epidural placement. Epidural replaced by Dr. Torres

## 2018-07-07 NOTE — PROGRESS NOTES
Data: Patient admitted to room 444 at 2345. Patient is a . Prenatal record reviewed.   Obstetric History       T1      L1     SAB1   TAB0   Ectopic0   Multiple0   Live Births1       # Outcome Date GA Lbr Yevgeniy/2nd Weight Sex Delivery Anes PTL Lv   3 Current            2 SAB 17 9w0d          1 Term 16 40w3d 04:15 / 01:52 3.941 kg (8 lb 11 oz) F Vag-Spont EPI  ANNETTE      Name: [place]      Apgar1:  9                Apgar5: 9      .  Medical History:   Past Medical History:   Diagnosis Date     UTI (urinary tract infection)    .  Gestational age 38w0d. Vital signs per doc flowsheet. Fetal movement present. Patient reports Rule Out Labor   as reason for admission. Support persons Juan present.  Action: Report from Mariangel BEY RN obtained at 2315. Care of patient assumed at 2315. Verbal consent for EFM, external fetal monitors applied. Admission assessment completed. Patient and support persons educated on labor process. Patient instructed to report change in fetal movement, contractions, vaginal leaking of fluid or bleeding, abdominal pain, or any concerns related to the pregnancy to her nurse/physician. Patient oriented to room, call light in reach.   Response: Dr. Herman & Dr. Deras informed of SVE. Plan per provider is admission, prep patient for epidural. Patient verbalized understanding of education and verbalized agreement with plan. Patient coping with labor via emotional support & birthing ball, requesting epidural.

## 2018-07-07 NOTE — PROVIDER NOTIFICATION
18   Provider Notification   Provider Name/Title Dr. Deras and Dr. Núñez   Method of Notification In Department   Request Evaluate in Person   Notification Reason Patient Arrived     Patient arrived, 37w6d, , rule out labor. Contractions started at 6pm, q4-5min on toco. Pt c/o pressure and back pain with contractions. Pt denies LOF/bleeding. Notified Dr. Deras and Dr. Núñez in department, Dr. Deras stated to check pt cervix, SVE at  was 3/90/-2. Dr. Deras stated to have pt ambulate halls and sit on birthing ball. Plan is to then re-check the patients cervix and develop a plan from there.

## 2018-07-07 NOTE — ANESTHESIA PROCEDURE NOTES
Epidural Procedure Note    Staff:     Anesthesiologist:  ADALBERTO MARES  Location: OB     Procedure start time:  7/7/2018 12:50 AM     Procedure end time:  7/7/2018 1:08 AM   Pre-procedure checklist:   patient identified, IV checked, site marked, risks and benefits discussed, informed consent, monitors and equipment checked, pre-op evaluation, at physician/surgeon's request and post-op pain management      Correct Patient: Yes      Correct Position: Yes      Correct Site: Yes      Correct Procedure: Yes      Correct Laterality:  Yes    Site Marked:  Yes  Procedure:     Procedure:  Epidural catheter    ASA:  2    Position:  Sitting    Sterile Prep: chloraprep, mask, sterile gloves and patient draped      Insertion site:  L3-4    Local skin infiltration:  2% lidocaine    amount (mL):  2    Approach:  Midline    Needle gauge (G):  17    Needle Length (in):  3.5    Block Needle Type:  Jvuhmathieu    Injection Technique:  LORT saline    EMMY at (cm):  5.5    Attempts:  1    Redirects:  0    Catheter gauge (G):  19    Catheter threaded easily: Yes      Threaded to cm at skin:  10    Threaded in epidural space (cm):  4.5    Paresthesias:  No    Aspiration negative for Heme or CSF: Yes      Test dose (mL):  3     Local anesthetic:  Lidocaine 1.5% w/ 1:200,000 epinephrine    Test dose time:  01:05    Test dose negative for signs of intravascular, subdural or intrathecal injection: Yes

## 2018-07-07 NOTE — ANESTHESIA PROCEDURE NOTES
Epidural Procedure Note    Staff:     Anesthesiologist:  ADALBERTO MARES    Referred By:  Augusta  Location: OB     Procedure start time:  7/7/2018 12:12 AM     Procedure end time:  7/7/2018 12:30 AM   Pre-procedure checklist:   patient identified, IV checked, site marked, risks and benefits discussed, informed consent, monitors and equipment checked, pre-op evaluation, at physician/surgeon's request and post-op pain management      Correct Patient: Yes      Correct Position: Yes      Correct Site: Yes      Correct Procedure: Yes      Correct Laterality:  Yes    Site Marked:  Yes  Procedure:     Procedure:  Epidural catheter    ASA:  2    Position:  Sitting    Sterile Prep: chloraprep, mask, sterile gloves and patient draped      Insertion site:  L3-4    Local skin infiltration:  2% lidocaine    amount (mL):  3    Approach:  Midline    Needle gauge (G):  17    Needle Length (in):  3.5    Block Needle Type:  Touhy    Injection Technique:  LORT saline    EMMY at (cm):  5    Attempts:  1    Redirects:  0    Catheter gauge (G):  19    Catheter threaded easily: Yes      Threaded to cm at skin:  10    Threaded in epidural space (cm):  5    Paresthesias:  No    Aspiration negative for Heme or CSF: Yes      Test dose (mL):  3    Test dose negative for signs of intravascular, subdural or intrathecal injection: Yes

## 2018-07-07 NOTE — PROGRESS NOTES
[facility]  Labor Progress Note    Subjective:  Patient is comfortable with epidural.    Objective:   Patient Vitals for the past 4 hrs:   BP SpO2   18 0120 118/63 -   18 0118 120/68 -   18 0116 116/68 99 %   18 0114 129/71 -   18 0112 122/67 -   18 0111 - 100 %   18 0110 128/70 -   18 0108 125/63 -   18 0105 124/61 -   18 0100 133/66 -   18 0056 123/68 -   18 0054 121/67 -   18 0052 129/71 -   18 0050 124/68 -   18 0043 134/78 -   18 0038 130/72 -   18 0036 126/67 -   18 0034 121/68 -   18 0032 125/68 -   18 0030 124/65 -   18 0028 119/62 -   18 0026 131/60 99 %   18 0024 114/61 -   18 0013 120/71 -   18 0011 - 100 %     SVE: 10/100/BBOW    FHT: Baseline 125, moderate variability, + accelerations, + decelerations, spontaneous and variable   [place]: 3-4 contractions in 10 minutes    Assessment  Ms. Roshni Oliver is a 38 year old  at 37w6d by LMP c/w 9w3d US, who presents in spontaneous labor.      Plan  Spontaneous labor:  Admit to L&D  - AROM when able  - GBS negative, no PCN indicated  - desires epidural  - Anticipate      FWB:                - Category 1 FHT.  Continue EFM and toco     PNC:                 - Rh positive, Rubella immune, GBS negative,  GTT 83/128/97/90, Placenta anterior       Bettye Herman MD PhD  OB/GYN Resident, PGY-3  2018 1:38 AM

## 2018-07-07 NOTE — L&D DELIVERY NOTE
Delivery Summary with [place]  DELIVERY SUMMARY    Roshni Oliver MRN# [id number]   Age: 38 year old YOB: 1980     Roshni Oliver is a now  who was admitted in spontaneous labor. She progressed to full dilation and SROM with clear fluid. She delivered a vigorous female infant on 2018 at 0236 with APGARs 9/9 at 1 and 5 minutes, weight pending. Cephalic presentation with direct OA position. Infant delivered easily. No nuchal cord. Placenta delivered intact with 3-vessel cord. A 1st degree laceration was present. It was hemostatic and was not repaired. Fundus was firm with pitocin running through the IV.  ml. Dr. Deras was present for the entire delivery.     Bettye Herman MD PhD  Ob/Gyn PGY-3  2018 2:53 AM      [facility] Assessment Tool Data    Gestational Age:  Gestational Age: 38w0d     Maternal temperature range:  Temp  Av.7  F (37.1  C)  Min: 98.7  F (37.1  C)  Max: 98.7  F (37.1  C)    Membranes ruptured for:   rupture date, rupture time, delivery date, or delivery time have not been documented     GBS status:  Lab Results   Component Value Date    GBS Negative 2018       Antibiotic Status:  Antibiotics         IV Antibiotic Given         Additional Management      Fetal Status Prior to  Delivery      Fetal Status Comments         Sepsis Prebirth Score:       Sepsis Postbirth Score:       Determination based on clinical exam after birth:       Disposition:            Labor Event Times    Labor onset date:  18 Onset time:  10:00 PM   Dilation complete date:  18 Complete time:   1:35 AM   Start pushing date/time:  2018 0230            Labor Length    1st Stage (hrs):  3 (min):  35   2nd Stage (hrs):  1 (min):  1   3rd Stage (hrs):  0 (min):  3      Labor Events     labor?:  No    steroids:  None   Labor Type:  Spontaneous   Predominate monitoring during 1st stage:  continuous electronic fetal monitoring      Antibiotics  received during labor?:  No      Rupture identifier:  Rupture 1   Rupture date/time: 18 0200   Rupture type:  Spontaneous rupture of membranes occuring during spontaneous labor or augmentation   Fluid color:  Clear   Fluid odor:  Normal      1:1 continuous labor support provided by?:  RN          Delivery/Placenta Date and Time    Delivery Date:  18 Delivery Time:   2:36 AM   Placenta Date/Time:  2018  2:39 AM   Oxytocin given at the time of delivery:  after delivery of baby      Vaginal Counts    Initial count performed by 2 team members:   Two Team Members   Dr. Virgil Deras          Needles Suture [place] Sponges Instruments   Initial counts 2 0 5    Added to count       Final counts 2 0 5       Placed during labor Accounted for at the end of labor      Final count performed by 2 team members:   Two Team Members   Dr. Virgil Sevilla RN         Final count correct?:  Yes         Apgars    Living status:  Living    1 Minute 5 Minute 10 Minute 15 Minute 20 Minute   Skin color: 1  1       Heart rate: 2  2       Reflex irritability: 2  2       Muscle tone: 2  2       Respiratory effort: 2  2       Total: 9  9          Apgars assigned by:  NEO VILLEGAS RN      Cord    Vessels:  3 Vessels Complications:  None   Cord Blood Disposition:  Lab Gases Sent?:  No         [place] Resuscitation    Methods:  None       Care at Delivery:  Baby girl delivered at 0236. Spontaneous cry. Dried and stimulated with warm blankets, placed to mothers chest.      Skin to Skin and Feeding Plan    Skin to skin initiation date/time: 18   Skin to skin with:  Mother   Skin to skin end date/time:     How do you plan to feed your baby:  Breastfeeding      Labor Events and Shoulder Dystocia    Shoulder dystocia present?:  Neg            Delivery (Maternal) (Provider to Complete) ([id number])    Episiotomy:  None   Perineal lacerations:  1st Repaired?:  No         Mother's Information  Mother: Benito  Roshni #[id number]    Start of Mother's Information     IO Blood Loss  07/06/18 2200 - 07/07/18 0338    Mom's I/O Activity            End of Mother's Information  Mother: Roshni Oliver #[id number]            Delivery - Provider to Complete ([id number])    Delivering clinician:  NARENDRA CAST   Attempted Delivery Types (Choose all that apply):  Spontaneous Vaginal Delivery   Delivery Type (Choose the 1 that will go to the Birth History):  Vaginal, Spontaneous Delivery                     Other personnel:   Provider Role   BETTYE HERMAN Resident            Placenta    Delayed Cord Clamping:  Done   Date/Time:  7/7/2018  2:39 AM   Removal:  Spontaneous   Disposition:  Hospital disposal      Anesthesia    Method:  Epidural         Presentation and Position    Presentation:  Vertex   Position:  Middle Occiput Anterior                    Bettye Herman MD

## 2018-07-07 NOTE — PROGRESS NOTES
Vaginal Delivery Note   of viable Female with Dr. Deras, Dr. Herman in attendance.  Nursery RN Juana HAYNES present.  Infant with spontaneous cry, to mother's abdomen, dried and stimulated.  APGAR at 1 minute:  8 and APGAR at 5 minutes:  9.  Placenta delivered with out complication, pitocin bolused per protocol after delivery of , 1st degree laceration, no repair, antoni cares provided. QBL 100ml.  Mother and baby in stable condition.

## 2018-07-07 NOTE — PROVIDER NOTIFICATION
07/07/18 0132   Provider Notification   Provider Name/Title Dr. Herman   Method of Notification At Bedside   Notification Reason SVE   Dr. Herman at bedside for SVE following FHR decels (resolved w/ repositioning & IV fluid bolus)

## 2018-07-07 NOTE — PROVIDER NOTIFICATION
07/07/18 0408   Provider Notification   Provider Name/Title Dr. Herman   Method of Notification Electronic Page   Request Evaluate - Remote   Notification Reason Maternal Vital Sign Change   Pt had severe range BPs x2, labetalol 80 given at 0353. Mag loading dose will be done at 0414

## 2018-07-08 LAB — HGB BLD-MCNC: 11.1 G/DL (ref 11.7–15.7)

## 2018-07-08 PROCEDURE — 85018 HEMOGLOBIN: CPT | Performed by: STUDENT IN AN ORGANIZED HEALTH CARE EDUCATION/TRAINING PROGRAM

## 2018-07-08 PROCEDURE — 12000030 ZZH R&B OB INTERMEDIATE UMMC

## 2018-07-08 PROCEDURE — 25000132 ZZH RX MED GY IP 250 OP 250 PS 637: Performed by: STUDENT IN AN ORGANIZED HEALTH CARE EDUCATION/TRAINING PROGRAM

## 2018-07-08 PROCEDURE — 36415 COLL VENOUS BLD VENIPUNCTURE: CPT | Performed by: STUDENT IN AN ORGANIZED HEALTH CARE EDUCATION/TRAINING PROGRAM

## 2018-07-08 RX ADMIN — IBUPROFEN 800 MG: 400 TABLET ORAL at 14:33

## 2018-07-08 RX ADMIN — IBUPROFEN 800 MG: 400 TABLET ORAL at 02:51

## 2018-07-08 RX ADMIN — IBUPROFEN 800 MG: 400 TABLET ORAL at 21:00

## 2018-07-08 RX ADMIN — ACETAMINOPHEN 650 MG: 325 TABLET, FILM COATED ORAL at 02:51

## 2018-07-08 RX ADMIN — SENNOSIDES AND DOCUSATE SODIUM 1 TABLET: 8.6; 5 TABLET ORAL at 21:00

## 2018-07-08 RX ADMIN — SENNOSIDES AND DOCUSATE SODIUM 1 TABLET: 8.6; 5 TABLET ORAL at 08:08

## 2018-07-08 NOTE — PLAN OF CARE
Problem: Patient Care Overview  Goal: Plan of Care/Patient Progress Review  Outcome: No Change  VSS. LS CTA. Pt denies headache, dizziness or changes to vision. Breasts are soft, nipples intact. BS+, flatus+, BM+. Patient is taking stool softeners. Pt is voiding independently and with out difficulty. FF@ U/1, midline. Perineum is well-approximated, pt is using tucks, antoni bottle and sitz baths to manage discomfort. Scant amount of rubra lochia. Pt denies pain in legs, no clonus, +1 edema in bilateral leg, +2 in bilateral feet. Pt c/o cramping pain. Patient is taking ibuprofen and applying heat packs to manage her pain. Patient is loving towards her , is attentive to  needs and is active in  cares. FOB is supportive and at bedside.

## 2018-07-08 NOTE — PLAN OF CARE
Problem: Patient Care Overview  Goal: Plan of Care/Patient Progress Review  Outcome: Improving  Pt vitals & PP assessments are stable. Up ad bart & indep with self & infant cares. Denies pain thus far.

## 2018-07-08 NOTE — PROGRESS NOTES
This RN re-assumed cares of this patient at 0310. Report received from ISAIAS Cardenas.  Pt stable. Denies pain and able to sleep.

## 2018-07-09 VITALS
HEART RATE: 67 BPM | OXYGEN SATURATION: 98 % | TEMPERATURE: 98.1 F | SYSTOLIC BLOOD PRESSURE: 136 MMHG | DIASTOLIC BLOOD PRESSURE: 91 MMHG | RESPIRATION RATE: 18 BRPM

## 2018-07-09 PROCEDURE — 25000132 ZZH RX MED GY IP 250 OP 250 PS 637: Performed by: STUDENT IN AN ORGANIZED HEALTH CARE EDUCATION/TRAINING PROGRAM

## 2018-07-09 RX ADMIN — SENNOSIDES AND DOCUSATE SODIUM 1 TABLET: 8.6; 5 TABLET ORAL at 07:38

## 2018-07-09 RX ADMIN — IBUPROFEN 800 MG: 400 TABLET ORAL at 04:13

## 2018-07-09 NOTE — PROGRESS NOTES
Post Partum Progress Note  PPD#2    Subjective:  No complaints this AM, feels ready to DC home. Pain is improving and well controlled on current medication regimen. She is tolerating PO intake. Lochia present and light.  She is voiding without difficulty. She has passed flatus and has had a BM. She is ambulating without dizziness or difficulty.  She denies headache, changes in vision, nausea/vomiting, chest pain, shortness of breath, RUQ pain, or worsening edema.      Objective:  Vitals:    18 2026 18 0251 18 0808 18 1745   BP: 112/67 111/63 118/82 126/73   Pulse:  67    Resp:    Temp: 98.1  F (36.7  C) 97.9  F (36.6  C) 98.1  F (36.7  C) 98.1  F (36.7  C)   TempSrc: Oral Oral Oral Oral   SpO2:       General: NAD. A&Ox3.  CV: Regular rate, well perfused.   Pulm: Normal respiratory effort.  Abd: Soft, non-tender, non-distended. Fundus is firm and at level umbilicus.    Ext: Trace lower extremity edema bilaterally. No calf tenderness.    Assessment/Plan:  Roshni Oliver is a 38 year old  female who is PPD#2 s/p .    - Encourage routine post-operative goals including ambulation  - PNC: Rh +. Rubella immune. No intervention indicated.  - Pain: controlled on oral medications  - Heme: Hgb 11.2> > 11.1  - Endocrine: failed GCT, passed GTT  - GI: continue anti-emetics and stool softeners as needed.  - :Voiding spontaneously.  - Infant: Stable in room with mom  - Feeding: Is breastfeeding with good latch  - BC: Considering POP at postpartum visit    Discharge to home today, meeting postpartum goals.     # Pain Assessment:  Current Pain Score 2018   Patient currently in pain? yes   Pain location -   Pain descriptors Cramping   - Roshni is experiencing pain due to . Pain management was discussed and the plan was created in a collaborative fashion.  Roshni's response to the current recommendations: engaged  - Pharmacologic adjuvants: NSAIDs and  Acetaminophen    Fouzia Galicia MD   OB/GYN Resident PGY-2  7/9/2018 6:57 AM      Attestation:   This patient was seen and evaluated by me, separately from the house staff team. I have reviewed the note/plan above and agree.     Doing well and ready to go home today. No questions. Plan RTC 6 wks for pp visit.    India Best MD

## 2018-07-09 NOTE — PLAN OF CARE
Problem: Patient Care Overview  Goal: Plan of Care/Patient Progress Review  Outcome: Improving  VSS and postpartum assessments WDL.  Up ad bart with steady gait.  Independent with cares.  Bonding well with infant.  Breastfeeding on cue independently, though with a shallow latch and sore nipples.  Provided education and assistance with getting infant skin-to-skin for feedings and postioning to get a deeper latch, also provided pt with hydrogel pads and education sheet.  Pain managed with ibuprofen per MAR.  No family/visitors this shift, staff providing support.  Will continue with postpartum cares and education per plan of care.

## 2018-07-09 NOTE — PLAN OF CARE
Problem: Patient Care Overview  Goal: Plan of Care/Patient Progress Review  Outcome: Adequate for Discharge Date Met: 07/09/18  Pt discharged to home with baby. D/c instructions & prescriptions given/reviewed. ID bands double checked. Pt had no further questions & verbalized understanding her plan. Instructed to F/U at clinic tomorrow or next Tuesday for BP check & within 6 weeks for PP check.

## 2018-07-09 NOTE — PLAN OF CARE
Problem: Patient Care Overview  Goal: Plan of Care/Patient Progress Review  Outcome: Improving  Data: Vital signs and postpartum checks WDL - see flow record. Patient eating and drinking normally. Patient able to empty bladder independently and is up ambulating.  Patient performing self cares and is able to care for infant. Breastfeeding on demand.  Action: Patient medicated during the shift for pain with Ibuprofen. Patient education done see education record.  Response: Positive attachment behaviors observed with infant. Support persons not present.    Plan: Continue with the plan of care and notify provider if decline in status. Will continue to monitor and assess.

## 2018-07-09 NOTE — PROGRESS NOTES
Discussed mild range BP with Dr. Best.  Patient to have blood pressure check in office either tomorrow on next Tues.

## 2018-07-10 ENCOUNTER — TELEPHONE (OUTPATIENT)
Dept: FAMILY MEDICINE | Facility: CLINIC | Age: 38
End: 2018-07-10

## 2018-07-10 ENCOUNTER — ALLIED HEALTH/NURSE VISIT (OUTPATIENT)
Dept: NURSING | Facility: CLINIC | Age: 38
End: 2018-07-10
Payer: COMMERCIAL

## 2018-07-10 VITALS — DIASTOLIC BLOOD PRESSURE: 77 MMHG | HEART RATE: 72 BPM | SYSTOLIC BLOOD PRESSURE: 118 MMHG

## 2018-07-10 DIAGNOSIS — Z01.30 BP CHECK: Primary | ICD-10-CM

## 2018-07-10 PROCEDURE — 99207 ZZC NO CHARGE NURSE ONLY: CPT

## 2018-07-10 NOTE — TELEPHONE ENCOUNTER
Reason for Call:  Form, our goal is to have forms completed with 72 hours, however, some forms may require a visit or additional information.    Type of letter, form or note:  FMLA    Who is the form from?: Patient    Where did the form come from: Patient or family brought in       What clinic location was the form placed at?: [facility]    Where the form was placed: Given to physician    Okay to fax forms when completed and mail copy to mango Marsh MA

## 2018-07-10 NOTE — TELEPHONE ENCOUNTER
Form completed, faxed to number listed on form, copy sent to scanning and copy mailed to pt.   Kerry Hung RN-BSN

## 2018-07-10 NOTE — MR AVS SNAPSHOT
After Visit Summary   7/10/2018    Roshni Oliver    MRN: [id number]           Patient Information     Date Of Birth          1980        Visit Information        Provider Department      7/10/2018 2:00 PM [facility] MEDICAL ASSISTANT [facility]        Today's Diagnoses     BP check    -  1       Follow-ups after your visit        Your next 10 appointments already scheduled     Jul 10, 2018  2:00 PM CDT   Nurse Only with [facility] MEDICAL ASSISTANT   [facility] ([facility])    [address] [place] [address] [phone]              Who to contact     If you have questions or need follow up information about today's clinic visit or your schedule please contact [facility] directly at [phone].  Normal or non-critical lab and imaging results will be communicated to you by MyChart, letter or phone within 4 business days after the clinic has received the results. If you do not hear from us within 7 days, please contact the clinic through MyChart or phone. If you have a critical or abnormal lab result, we will notify you by phone as soon as possible.  Submit refill requests through [employer] or call your pharmacy and they will forward the refill request to us. Please allow 3 business days for your refill to be completed.          Additional Information About Your Visit        MyChart Information     [employer] gives you secure access to your electronic health record. If you see a primary care provider, you can also send messages to your care team and make appointments. If you have questions, please call your primary care clinic.  If you do not have a primary care provider, please call [phone] and they will assist you.        Care EveryWhere ID     This is your Care EveryWhere ID. This could be used by other organizations to access your [facility] medical records  [id number]        Your Vitals Were     Pulse Last Period                 72 10/14/2017 (Exact Date)           Blood Pressure from Last 3 Encounters:   07/10/18 118/77   18 (!) 136/91   18 102/80    Weight from Last 3 Encounters:   18 204 lb (92.5 kg)   18 205 lb (93 kg)   18 200 lb (90.7 kg)              Today, you had the following     No orders found for display       Primary Care Provider Office Phone # Fax #    India Best [phone] [address]  [facility] [address]        Equal Access to Services     [facility]: Hadii keren vora hadadiel De La Garza, waaxda lucamila, qaybta kaalmada ana, shan cat . So [facility] [phone].    ATENCIÓN: Si habla español, tiene a gonzáles disposición servicios gratuitos de asistencia lingüística. [facility] [phone].    We comply with applicable federal civil rights laws and [place] laws. We do not discriminate on the basis of race, color, national origin, age, disability, sex, sexual orientation, or gender identity.            Thank you!     Thank you for choosing [facility]  for your care. Our goal is always to provide you with excellent care. Hearing back from our patients is one way we can continue to improve our services. Please take a few minutes to complete the written survey that you may receive in the mail after your visit with us. Thank you!             Your Updated Medication List - Protect others around you: Learn how to safely use, store and throw away your medicines at www.disposemymeds.org.          This list is accurate as of 7/10/18 10:59 AM.  Always use your most recent med list.                   Brand Name Dispense Instructions for use Diagnosis    acetaminophen 325 MG tablet    TYLENOL    30 tablet    Take 2 tablets (650 mg) by mouth every 4 hours as needed for mild pain or fever     (normal spontaneous vaginal delivery)       ibuprofen 400 MG tablet    ADVIL/MOTRIN    30 tablet    Take 1 tablet (400 mg)  by mouth every 6 hours as needed for other (cramping)     (normal spontaneous vaginal delivery)       prenatal multivitamin plus iron 27-0.8 MG Tabs per tablet      Take 1 tablet by mouth daily        senna-docusate 8.6-50 MG per tablet    SENOKOT-S;PERICOLACE    40 tablet    Take 1 tablet by mouth 2 times daily     (normal spontaneous vaginal delivery)       terconazole 0.4 % cream    TERAZOL 7    45 g    Place 1 applicator vaginally At Bedtime for 7 days    Vaginal itching

## 2018-07-10 NOTE — NURSING NOTE
Roshni Oliver is a 38 year old patient who comes in today for a Blood Pressure check.  Initial BP:  /77  Pulse 72  LMP 10/14/2017 (Exact Date)     72  Disposition: results routed to provider      Janelle Marsh MA

## 2018-08-21 ENCOUNTER — PRENATAL OFFICE VISIT (OUTPATIENT)
Dept: OBGYN | Facility: CLINIC | Age: 38
End: 2018-08-21
Payer: COMMERCIAL

## 2018-08-21 VITALS
BODY MASS INDEX: 26.54 KG/M2 | HEIGHT: 69 IN | WEIGHT: 179.2 LBS | SYSTOLIC BLOOD PRESSURE: 100 MMHG | DIASTOLIC BLOOD PRESSURE: 70 MMHG

## 2018-08-21 PROBLEM — Z23 NEED FOR TDAP VACCINATION: Status: RESOLVED | Noted: 2017-12-12 | Resolved: 2018-08-21

## 2018-08-21 PROBLEM — Z34.00 SUPERVISION OF NORMAL FIRST PREGNANCY, ANTEPARTUM: Status: RESOLVED | Noted: 2018-01-03 | Resolved: 2018-08-21

## 2018-08-21 PROBLEM — Z36.89 ENCOUNTER FOR TRIAGE IN PREGNANT PATIENT: Status: RESOLVED | Noted: 2018-07-06 | Resolved: 2018-08-21

## 2018-08-21 PROCEDURE — 99207 ZZC POST PARTUM EXAM: CPT | Performed by: OBSTETRICS & GYNECOLOGY

## 2018-08-21 RX ORDER — ACETAMINOPHEN AND CODEINE PHOSPHATE 120; 12 MG/5ML; MG/5ML
1 SOLUTION ORAL DAILY
Qty: 112 TABLET | Refills: 3 | Status: SHIPPED | OUTPATIENT
Start: 2018-08-21 | End: 2020-08-06

## 2018-08-21 NOTE — PROGRESS NOTES
"SUBJECTIVE:  Roshni Oliver is a 38 year old female  here for a postpartum visit.  She had a  on 18 delivering a healthy baby girl weighing 7 lbs 2 oz at term.      Today's Depression Rating was 5    delivery complications:  No  breast feeding:  Yes, going well  bladder problems:  No  bowel problems/hemorrhoids:  Yes, some straining and small blood sometimes  episiotomy/laceration/incision healed? Yes  vaginal flow:  None  [place]:  No  contraception:  oral contraceptive  emotional adjustment:  doing well and happy  back to work:  10/15/18    OBJECTIVE:  Blood pressure 100/70, height 5' 9\" (1.753 m), weight 179 lb 3.2 oz (81.3 kg), last menstrual period 10/14/2017, currently breastfeeding.   General - pleasant female in no acute distress.  Breast - deferred.  Abdomen - soft, nontender, nondistended, no hepatosplenomegaly.  Pelvic - EG: normal adult female, BUS: within normal limits, Vagina: well rugated, no discharge, Cervix: no lesions or CMT, Uterus: firm, normal sized and nontender, Adnexae: no masses or tenderness.  Rectovaginal - deferred.    ASSESSMENT:  normal postpartum exam    PLAN:  Discussed kegel exercises--moderate tone  May resume normal activities without restrictions  Pap smear was not  done today    The patient will use oral contraceptive for birth control. Full counseling was provided, and all questions answered. Compliance is strongly emphasized.  Return to clinic in one year for an annual or PRN.    DWIGHT LUIS"

## 2018-08-21 NOTE — MR AVS SNAPSHOT
After Visit Summary   8/21/2018    Roshni Oliver    MRN: [id number]           Patient Information     Date Of Birth          1980        Visit Information        Provider Department      8/21/2018 9:00 AM India Best MD [facility]        Today's Diagnoses     Routine postpartum follow-up    -  1      Care Instructions    I sent the prescription to the pharmacy indicated.   Remember to use condoms for first 2 weeks of taking the progesterone only pill and that you need to take this pill about the same time everyday or you will have bleeding/spotting.   Also unlike a regular pill, every pill in the pack contains progesterone, so don't throw out the last week--take those tablets too!  :)    If you start to wean or are breastfeeding/pumping only 2 times a day, we should switch you over to a regular estrogen/progesterone pill at that point for effectiveness, so let me know.            Follow-ups after your visit        Who to contact     If you have questions or need follow up information about today's clinic visit or your schedule please contact [facility] directly at [phone].  Normal or non-critical lab and imaging results will be communicated to you by wikifoliohart, letter or phone within 4 business days after the clinic has received the results. If you do not hear from us within 7 days, please contact the clinic through [employer] or phone. If you have a critical or abnormal lab result, we will notify you by phone as soon as possible.  Submit refill requests through [employer] or call your pharmacy and they will forward the refill request to us. Please allow 3 business days for your refill to be completed.          Additional Information About Your Visit        wikifoliohart Information     [employer] gives you secure access to your electronic health record. If you see a primary care provider, you can also send messages to your care team and make appointments. If  "you have questions, please call your primary care clinic.  If you do not have a primary care provider, please call [phone] and they will assist you.        Care EveryWhere ID     This is your Care EveryWhere ID. This could be used by other organizations to access your [place] medical records  [id number]        Your Vitals Were     Height Last Period Breastfeeding? BMI (Body Mass Index)          5' 9\" (1.753 m) 10/14/2017 (Exact Date) Yes 26.46 kg/m2         Blood Pressure from Last 3 Encounters:   08/21/18 100/70   07/10/18 118/77   07/09/18 (!) 136/91    Weight from Last 3 Encounters:   08/21/18 179 lb 3.2 oz (81.3 kg)   07/03/18 204 lb (92.5 kg)   06/21/18 205 lb (93 kg)              Today, you had the following     No orders found for display         Today's Medication Changes          These changes are accurate as of 8/21/18  9:20 AM.  If you have any questions, ask your nurse or doctor.               Start taking these medicines.        Dose/Directions    norethindrone 0.35 MG per tablet   Commonly known as:  MICRONOR   Used for:  Routine postpartum follow-up   Started by:  India Best MD        Dose:  1 tablet   Take 1 tablet (0.35 mg) by mouth daily   Quantity:  112 tablet   Refills:  3            Where to get your medicines      These medications were sent to [facility] [address] [address] AT SEC of [facility] [address]  [address] [facility] [address] [address]     Phone:  [phone]     norethindrone 0.35 MG per tablet                Primary Care Provider Office Phone # Fax #    India Best [phone] [address] [facility] [address]        Equal Access to Services     [facility]: Hussein De La Garza, lauren boyce, qasolitariota kaalleyla perez, shan taylor. So [facility] [phone].    ATENCIÓN: Si habla español, tiene a gonzáles disposición servicios gratuitos de asistencia " lingüística. Mark al [phone].    We comply with applicable federal civil rights laws and [place] laws. We do not discriminate on the basis of race, color, national origin, age, disability, sex, sexual orientation, or gender identity.            Thank you!     Thank you for choosing [facility]  for your care. Our goal is always to provide you with excellent care. Hearing back from our patients is one way we can continue to improve our services. Please take a few minutes to complete the written survey that you may receive in the mail after your visit with us. Thank you!             Your Updated Medication List - Protect others around you: Learn how to safely use, store and throw away your medicines at www.disposemymeds.org.          This list is accurate as of 18  9:20 AM.  Always use your most recent med list.                   Brand Name Dispense Instructions for use Diagnosis    ibuprofen 400 MG tablet    ADVIL/MOTRIN    30 tablet    Take 1 tablet (400 mg) by mouth every 6 hours as needed for other (cramping)     (normal spontaneous vaginal delivery)       norethindrone 0.35 MG per tablet    MICRONOR    112 tablet    Take 1 tablet (0.35 mg) by mouth daily    Routine postpartum follow-up       prenatal multivitamin plus iron 27-0.8 MG Tabs per tablet      Take 1 tablet by mouth daily        senna-docusate 8.6-50 MG per tablet    SENOKOT-S;PERICOLACE    40 tablet    Take 1 tablet by mouth 2 times daily     (normal spontaneous vaginal delivery)

## 2018-08-21 NOTE — PATIENT INSTRUCTIONS
I sent the prescription to the pharmacy indicated.   Remember to use condoms for first 2 weeks of taking the progesterone only pill and that you need to take this pill about the same time everyday or you will have bleeding/spotting.   Also unlike a regular pill, every pill in the pack contains progesterone, so don't throw out the last week--take those tablets too!  :)    If you start to wean or are breastfeeding/pumping only 2 times a day, we should switch you over to a regular estrogen/progesterone pill at that point for effectiveness, so let me know.

## 2018-08-22 ASSESSMENT — PATIENT HEALTH QUESTIONNAIRE - PHQ9: SUM OF ALL RESPONSES TO PHQ QUESTIONS 1-9: 5

## 2018-09-24 ENCOUNTER — OFFICE VISIT (OUTPATIENT)
Dept: FAMILY MEDICINE | Facility: CLINIC | Age: 38
End: 2018-09-24
Payer: COMMERCIAL

## 2018-09-24 VITALS
DIASTOLIC BLOOD PRESSURE: 60 MMHG | TEMPERATURE: 97.8 F | HEART RATE: 64 BPM | RESPIRATION RATE: 16 BRPM | BODY MASS INDEX: 25.49 KG/M2 | WEIGHT: 172.6 LBS | SYSTOLIC BLOOD PRESSURE: 90 MMHG

## 2018-09-24 PROCEDURE — 99214 OFFICE O/P EST MOD 30 MIN: CPT | Performed by: NURSE PRACTITIONER

## 2018-09-24 ASSESSMENT — ANXIETY QUESTIONNAIRES
3. WORRYING TOO MUCH ABOUT DIFFERENT THINGS: NOT AT ALL
7. FEELING AFRAID AS IF SOMETHING AWFUL MIGHT HAPPEN: NOT AT ALL
5. BEING SO RESTLESS THAT IT IS HARD TO SIT STILL: NOT AT ALL
6. BECOMING EASILY ANNOYED OR IRRITABLE: MORE THAN HALF THE DAYS
GAD7 TOTAL SCORE: 4
1. FEELING NERVOUS, ANXIOUS, OR ON EDGE: SEVERAL DAYS
2. NOT BEING ABLE TO STOP OR CONTROL WORRYING: NOT AT ALL

## 2018-09-24 ASSESSMENT — PATIENT HEALTH QUESTIONNAIRE - PHQ9: 5. POOR APPETITE OR OVEREATING: SEVERAL DAYS

## 2018-09-24 NOTE — MR AVS SNAPSHOT
After Visit Summary   9/24/2018    Roshni Oliver    MRN: [id number]           Patient Information     Date Of Birth          1980        Visit Information        Provider Department      9/24/2018 10:00 AM Sonia Ash APRN CNP [facility]        Today's Diagnoses     Need for prophylactic vaccination and inoculation against influenza    -  1    Post-partum depression           Follow-ups after your visit        Additional Services     MENTAL HEALTH REFERRAL  - Adult; Outpatient Treatment; Individual/Couples/Family/Group Therapy/Health Psychology; [facility]: [facility] [phone]; We will contact you to schedule the appointment or please call with any questions       All scheduling is subject to the client's specific insurance plan & benefits, provider/location availability, and provider clinical specialities.  Please arrive 15 minutes early for your first appointment and bring your completed paperwork.    Please be aware that coverage of these services is subject to the terms and limitations of your health insurance plan.  Call member services at your health plan with any benefit or coverage questions.                            Who to contact     If you have questions or need follow up information about today's clinic visit or your schedule please contact [facility] directly at [phone].  Normal or non-critical lab and imaging results will be communicated to you by MyChart, letter or phone within 4 business days after the clinic has received the results. If you do not hear from us within 7 days, please contact the clinic through MyChart or phone. If you have a critical or abnormal lab result, we will notify you by phone as soon as possible.  Submit refill requests through [employer] or call your pharmacy and they will forward the refill request to us. Please allow 3 business days for your refill to be completed.          Additional  Information About Your Visit        Pathogenetixhart Information     [employer] gives you secure access to your electronic health record. If you see a primary care provider, you can also send messages to your care team and make appointments. If you have questions, please call your primary care clinic.  If you do not have a primary care provider, please call [phone] and they will assist you.        Care EveryWhere ID     This is your Care EveryWhere ID. This could be used by other organizations to access your [place] medical records  [id number]        Your Vitals Were     Pulse Temperature Respirations Last Period Breastfeeding? BMI (Body Mass Index)    64 97.8  F (36.6  C) (Oral) 16 (LMP Unknown) Yes 25.49 kg/m2       Blood Pressure from Last 3 Encounters:   09/24/18 90/60   08/21/18 100/70   07/10/18 118/77    Weight from Last 3 Encounters:   09/24/18 172 lb 9.6 oz (78.3 kg)   08/21/18 179 lb 3.2 oz (81.3 kg)   07/03/18 204 lb (92.5 kg)              We Performed the Following     FLU VACCINE, SPLIT VIRUS, IM (QUADRIVALENT) [54394]- >3 YRS     MENTAL HEALTH REFERRAL  - Adult; Outpatient Treatment; Individual/Couples/Family/Group Therapy/Health Psychology; [facility] [phone]; We will contact you to schedule the appointment or please call with any questions     Vaccine Administration, Initial [[id number]]          Today's Medication Changes          These changes are accurate as of 9/24/18 10:35 AM.  If you have any questions, ask your nurse or doctor.               Start taking these medicines.        Dose/Directions    sertraline 50 MG tablet   Commonly known as:  ZOLOFT   Used for:  Post-partum depression   Started by:  Sonia Ash APRN CNP        Dose:  25 mg   Take 0.5 tablets (25 mg) by mouth daily For 1 week then advance to a full tablet.   Quantity:  30 tablet   Refills:  0            Where to get your medicines      These medications were sent to [facility] [id number] -  [place], [place] - [address] of [address]  [address] [facility] [address], [facility] [address]     Phone:  [phone]     sertraline 50 MG tablet                Primary Care Provider Office Phone # Fax #    India Best [phone]       [address]  [facility] [address]        Equal Access to Services     [facility]: Hadii aad ku hadasho Soomaali, waaxda luqadaha, qaybta kaalmada adeegyada, waxay idiin hayaan adeeg kharash la'sureshn . So [facility] [phone].    ATENCIÓN: Si habla español, tiene a gonzáles disposición servicios gratuitos de asistencia lingüística. Mark al [phone].    We comply with applicable federal civil rights laws and [place] laws. We do not discriminate on the basis of race, color, national origin, age, disability, sex, sexual orientation, or gender identity.            Thank you!     Thank you for choosing [facility] your care. Our goal is always to provide you with excellent care. Hearing back from our patients is one way we can continue to improve our services. Please take a few minutes to complete the written survey that you may receive in the mail after your visit with us. Thank you!             Your Updated Medication List - Protect others around you: Learn how to safely use, store and throw away your medicines at www.disposemymeds.org.          This list is accurate as of 9/24/18 10:35 AM.  Always use your most recent med list.                   Brand Name Dispense Instructions for use Diagnosis    norethindrone 0.35 MG per tablet    MICRONOR    112 tablet    Take 1 tablet (0.35 mg) by mouth daily    Routine postpartum follow-up       sertraline 50 MG tablet    ZOLOFT    30 tablet    Take 0.5 tablets (25 mg) by mouth daily For 1 week then advance to a full tablet.    Post-partum depression

## 2018-09-24 NOTE — PROGRESS NOTES
SUBJECTIVE:   Roshni Oliver is a 38 year old female who presents to clinic today for the following health issues:      Post partum   not feeling self and feeling that everything is difficult, sitting around a lot and not really wanting to go anywhere  3 yo and infant at home.   Breastfeeding the baby.   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     is a  and not as supportive as she would like with the kids and household duties.    Feeling overwhelmed.    Anxious and sad.    Crying a lot.    No worries or thoughts about hurting herself or the kids.    Interested in counseling  Maybe open to medications.     Problem list and histories reviewed & adjusted, as indicated.  Additional history: as documented    Reviewed and updated as needed this visit by clinical staff  Tobacco  Allergies  Meds  Problems  Med Hx  Surg Hx  Fam Hx  Soc Hx        Reviewed and updated as needed this visit by Provider  Tobacco  Allergies  Meds  Problems  Med Hx  Surg Hx  Fam Hx  Soc Hx        Prior to injection verified patient identity using patient's name and date of birth.  Due to injection administration, patient instructed to remain in clinic for 15 minutes  afterwards, and to report any adverse reaction to me immediately.      ROS:  Constitutional, HEENT, cardiovascular, pulmonary, GI, , musculoskeletal, neuro, skin, endocrine and psych systems are negative, except as otherwise noted.    OBJECTIVE:     BP 90/60  Pulse 64  Temp 97.8  F (36.6  C) (Oral)  Resp 16  Wt 172 lb 9.6 oz (78.3 kg)  LMP  (LMP Unknown)  Breastfeeding? Yes  BMI 25.49 kg/m2  Body mass index is 25.49 kg/(m^2).  GENERAL: healthy, alert and no distress  RESP: lungs clear to auscultation - no rales, rhonchi or wheezes  CV: regular rate and rhythm, normal S1 S2, no S3 or S4, no murmur, click or rub, no peripheral edema and peripheral pulses strong  MS: no gross musculoskeletal defects noted, no edema  SKIN: no  "suspicious lesions or rashes  PSYCH: concentration poor, inattentive, affect flat and tearful  PHQ-9 SCORE 7/3/2018 8/21/2018 9/24/2018   Total Score - - -   Total Score 0 5 10     EFREM-7 SCORE 9/24/2018   Total Score 4           ASSESSMENT/PLAN:       ICD-10-CM    1. Post-partum depression F53 MENTAL HEALTH REFERRAL  - Adult; Outpatient Treatment; Individual/Couples/Family/Group Therapy/Health Psychology; [facility] [place]: [facility] [phone]; We will contact you to schedule the appointment or please call with any questions     sertraline (ZOLOFT) 50 MG tablet     Refer for counseling.    Will start sertraline today.  Discussed 1 year commitment on the medication - do not just stop because you are feeling better.    Discussed \"fuzzy head\" in the first week.   This will subside.  KEEP TAKING THE MEDICINE.    No harm contract agreed to.   Increase exercise - yoga and walking helps.    Increase antioxidants in your diet.    Minimize alcohol and caffeine, they make this worse.    F/u in 1 month or sooner if worsening.      See Patient Instructions    35 min with pt and more than 50% of the time was spent in counseling and coordination of care of the above issues    CASEY Stuart [facility]         "

## 2018-09-25 ASSESSMENT — ANXIETY QUESTIONNAIRES: GAD7 TOTAL SCORE: 4

## 2018-09-25 ASSESSMENT — PATIENT HEALTH QUESTIONNAIRE - PHQ9: SUM OF ALL RESPONSES TO PHQ QUESTIONS 1-9: 10

## 2020-03-01 ENCOUNTER — HEALTH MAINTENANCE LETTER (OUTPATIENT)
Age: 40
End: 2020-03-01

## 2020-08-06 ENCOUNTER — TRANSCRIBE ORDERS (OUTPATIENT)
Dept: MATERNAL FETAL MEDICINE | Facility: CLINIC | Age: 40
End: 2020-08-06

## 2020-08-06 ENCOUNTER — PRENATAL OFFICE VISIT (OUTPATIENT)
Dept: NURSING | Facility: CLINIC | Age: 40
End: 2020-08-06
Payer: COMMERCIAL

## 2020-08-06 ENCOUNTER — TELEPHONE (OUTPATIENT)
Dept: OBGYN | Facility: CLINIC | Age: 40
End: 2020-08-06

## 2020-08-06 VITALS — HEIGHT: 64 IN | BODY MASS INDEX: 27.31 KG/M2 | WEIGHT: 160 LBS

## 2020-08-06 DIAGNOSIS — O09.529 AMA (ADVANCED MATERNAL AGE) MULTIGRAVIDA 35+: Primary | ICD-10-CM

## 2020-08-06 DIAGNOSIS — O21.0 HYPEREMESIS GRAVIDARUM: ICD-10-CM

## 2020-08-06 DIAGNOSIS — O26.90 PREGNANCY RELATED CONDITION, ANTEPARTUM: Primary | ICD-10-CM

## 2020-08-06 DIAGNOSIS — Z23 NEED FOR TDAP VACCINATION: ICD-10-CM

## 2020-08-06 PROCEDURE — 99207 ZZC NO CHARGE NURSE ONLY: CPT

## 2020-08-06 ASSESSMENT — MIFFLIN-ST. JEOR: SCORE: 1390.76

## 2020-08-06 NOTE — PROGRESS NOTES
Important Information for Provider:     New ob nurse intake by phone, fourth pregnancy, AMA. Ordered first trimester screening/NIPT. Handouts reviewed and mailed. Patient requesting antinausea medication, sent Dr Nasir MONTESINOS. Has NOB with Dr Best 8/24/2020    Caffeine intake/servings daily - 1  Calcium intake/servings daily - 3  Exercise 7 times weekly - describe ; active with kids, walks, precautions given  Sunscreen used - Yes  Seatbelts used - Yes  Guns stored in the home - Yes  Self Breast Exam - Yes  Pap test up to date -  Yes  Eye exam up to date -  Yes  Dental exam up to date -  Yes  Immunizations reviewed and up to date - Yes  Abuse: Current or Past (Physical, Sexual or Emotional) - No  Do you feel safe in your environment - Yes  Do you cope well with stress - Yes  Do you suffer from insomnia - No        Prenatal OB Questionnaire  Patient supplied answers from flow sheet for:  Prenatal OB Questionnaire.  Past Medical History  Diabetes?: No  Hypertension : No  Heart disease, mitral valve prolapse or rheumatic fever?: No  An autoimmune disease such as lupus or rheumatoid arthritis?: No  Kidney disease or urinary tract infection?: No  Epilepsy, seizures or spells?: No  Migraine headaches?: No  A stroke or loss of function or sensation?: No  Any other neurological problems?: No  Have you ever been treated for depression?: No  Are you having problems with crying spells or loss of self-esteem?: No  Have you ever required psychiatric care?: No  Have you ever had hepatitis, liver disease or jaundice?: No  Have you been treated for blood clots in your veins, deep vein thromosis, inflammation in the veins, thrombosis, phlebitis, pulmonary embolism or varicosities?: No  Have you had excessive bleeding after surgery or dental work?: No  Do you bleed more than other women after a cut or scratch?: No  Do you have a history of anemia?: No  Have you ever had thyroid problems or taken thyroid medication?: No   Do you  have any endocrine problems?: No  Have you ever been in a major accident or suffered serious trauma?: No  Within the last year, has anyone hit, slapped, kicked or otherwise hurt you?: No  In the last year, has anyone forced you to have sex when you didn't want to?: No    Past Medical History 2   Have you ever received a blood transfusion?: No  Would you refuse a blood transfusion if a doctor judged it to be medically necessary?: No   If you answered Yes, would you rather die than receive a blood transfusion?: No  If you answered Yes, is this for Restorationism reasons?: No  Does anyone in your home smoke?: No  Do you use tobacco products?: No  Do you drink beer, wine or hard liquor?: No  Do you use any of the following: marijuana, speed, cocaine, heroin, hallucinogens or other drugs?: No   Is your blood type Rh negative?: No  Have you ever had abnormal antibodies in your blood?: No  Have you ever had asthma?: No  Have you ever had tuberculosis?: No  Do you have any allergies to drugs or over-the-counter medications?: (!) Yes(sulfa)  Allergies: Dust Mites, Aspartame, Ethanol, Venlafaxine, Hydrochloride, Sertraline: No  Have you had any breast problems?: No  Have you ever ?: (!) Yes  Have you ever had any other surgical procedures?: (!) Yes(hernia surgery age 4)  Have you been hospitalized for a nonsurgical reason excluding normal delivery?: No  Have you ever had any anesthetic complications?: No  Have you ever had an abnormal pap smear?: No    Past Medical History (Continued)  Do you have a history of abnormalities of the uterus?: No  Did your mother take AMANDEEP or any other hormones when she was pregnant with you?: No  Did it take you more than a year to become pregnant?: No  Have you ever been evaluated or treated for infertility?: No  Is there a history of medical problems in your family, which you feel may be important to this pregnancy?: No  Do you have any other problems we have not asked about which you feel  may be important to this pregnancy?: No    Symptoms since last menstrual period  Do you have any of the following symptoms: abdominal pain, blood in stools or urine, chest pain, shortness of breath, coughing or vomiting up blood, your heart racing or skipping beats, nausea and vomiting, pain on urination or vaginal discharge or bleed: (!) Yes  Will the patient be 35 years old or older at the time of delivery?: (!) Yes    Has the patient, baby's father or anyone in either family had:  Thalassemia (Italian, Greek, Mediterranean or  background only) and an MCV result less than 80?: No  Neural tube defect such as meningomyelocele, spina bifida or anencephaly?: No  Congenital heart defect?: No  Down's Syndrome?: No  Ronal-Sachs disease (Restorationism, Cajun, Khmer-Brazilian)?: No  Sickle cell disease or trait ()?: No  Hemophilia or other inherited problems of blood?: No  Muscular dystrophy?: No  Cystic fibrosis?: No  Atkinson's chorea?: No  Mental retardation/autism?: No  If yes, was the person tested for fragile X?: No  Any other inherited genetic or chromosomal disorder?: No  Maternal metabolic disorder (e.g Insulin-dependent diabetes, PKU)?: No  A child with birth defects not listed above?: No  Recurrent pregnancy loss or stillbirth?: No   Has the patient had any medications/street drugs/alcohol since her last menstrual period?: No  Does the patient or baby's father have any other genetic risks?: No    Infection History   Do you object to being tested for Hepatitis B?: No  Do you object to being tested for HIV?: No   Do you feel that you are at high risk for coming in contact with the AIDS virus?: No  Have you ever been treated for tuberculosis?: No  Have you ever had a positive skin test for tuberculosis?: No  Do you live with someone who has tuberculosis?: No  Have you ever been exposed to tuberculosis?: No  Do you have genital herpes?: No  Does your partner have genital herpes?: No  Have you had a viral  illness since your last period?: No  Have you ever had gonorrhea, chlamydia, syphilis, venereal warts, trichomoniasis, pelvic inflammatory disease or any other sexually transmitted disease?: No  Do you know if you are a genital group B streptococcus carrier?: No  Have you had chicken pox/varicella?: (!) Yes   Have you been vaccinated against chicken Pox?: No  Have you had any other infectious diseases?: No      Allergies as of 8/6/2020:    Allergies as of 08/06/2020 - Reviewed 08/06/2020   Allergen Reaction Noted     Sulfa drugs Itching 11/02/2010       Current medications are:  Current Outpatient Medications   Medication Sig Dispense Refill     sertraline (ZOLOFT) 50 MG tablet Take 0.5 tablets (25 mg) by mouth daily For 1 week then advance to a full tablet. 30 tablet 0         Early ultrasound screening tool:    Does patient have irregular periods?  No  Did patient use hormonal birth control in the three months prior to positive urine pregnancy test? No  Is the patient breastfeeding?  No  Is the patient 10 weeks or greater at time of education visit?  No

## 2020-08-06 NOTE — TELEPHONE ENCOUNTER
Patient requesting antinausea medication, combination of B6, Unisom is not effective    Pharmacy updated    Jolene Sethi LPN

## 2020-08-07 RX ORDER — ONDANSETRON 4 MG/1
4 TABLET, ORALLY DISINTEGRATING ORAL EVERY 6 HOURS PRN
Qty: 60 TABLET | Refills: 3 | Status: SHIPPED | OUTPATIENT
Start: 2020-08-07 | End: 2021-09-23

## 2020-08-16 ENCOUNTER — NURSE TRIAGE (OUTPATIENT)
Dept: NURSING | Facility: CLINIC | Age: 40
End: 2020-08-16

## 2020-08-16 NOTE — TELEPHONE ENCOUNTER
Patient says last week, she was given Zofran and it is causing constipation.  Patient is currently 8-9 weeks pregnant.  Patient wanted to know what she can take for constipation.  Reviewed home care advice with caller per RN triage protocol guideline.  Caller verbalized understanding and agrees with plan.      Additional Information    Caller requesting information about medication during pregnancy; adult is not ill and triager answers question    Protocols used: MEDICATION QUESTION CALL-A-AH

## 2020-08-23 PROCEDURE — 87624 HPV HI-RISK TYP POOLED RSLT: CPT | Performed by: OBSTETRICS & GYNECOLOGY

## 2020-08-24 ENCOUNTER — PRENATAL OFFICE VISIT (OUTPATIENT)
Dept: OBGYN | Facility: CLINIC | Age: 40
End: 2020-08-24
Payer: COMMERCIAL

## 2020-08-24 VITALS
WEIGHT: 187 LBS | DIASTOLIC BLOOD PRESSURE: 74 MMHG | TEMPERATURE: 98.4 F | SYSTOLIC BLOOD PRESSURE: 116 MMHG | BODY MASS INDEX: 32.1 KG/M2 | HEART RATE: 93 BPM

## 2020-08-24 DIAGNOSIS — O09.529 AMA (ADVANCED MATERNAL AGE) MULTIGRAVIDA 35+: ICD-10-CM

## 2020-08-24 DIAGNOSIS — Z34.80 SUPERVISION OF OTHER NORMAL PREGNANCY, ANTEPARTUM: Primary | ICD-10-CM

## 2020-08-24 DIAGNOSIS — Z12.4 PAP SMEAR FOR CERVICAL CANCER SCREENING: ICD-10-CM

## 2020-08-24 LAB
ALBUMIN UR-MCNC: NEGATIVE MG/DL
APPEARANCE UR: CLEAR
BILIRUB UR QL STRIP: NEGATIVE
COLOR UR AUTO: YELLOW
ERYTHROCYTE [DISTWIDTH] IN BLOOD BY AUTOMATED COUNT: 13 % (ref 10–15)
GLUCOSE UR STRIP-MCNC: NEGATIVE MG/DL
HCT VFR BLD AUTO: 40.1 % (ref 35–47)
HGB BLD-MCNC: 13.5 G/DL (ref 11.7–15.7)
HGB UR QL STRIP: NEGATIVE
KETONES UR STRIP-MCNC: NEGATIVE MG/DL
LEUKOCYTE ESTERASE UR QL STRIP: NEGATIVE
MCH RBC QN AUTO: 28.9 PG (ref 26.5–33)
MCHC RBC AUTO-ENTMCNC: 33.7 G/DL (ref 31.5–36.5)
MCV RBC AUTO: 86 FL (ref 78–100)
NITRATE UR QL: NEGATIVE
PH UR STRIP: 5.5 PH (ref 5–7)
PLATELET # BLD AUTO: 241 10E9/L (ref 150–450)
RBC # BLD AUTO: 4.67 10E12/L (ref 3.8–5.2)
SOURCE: NORMAL
SP GR UR STRIP: >1.03 (ref 1–1.03)
UROBILINOGEN UR STRIP-ACNC: 0.2 EU/DL (ref 0.2–1)
WBC # BLD AUTO: 9.2 10E9/L (ref 4–11)

## 2020-08-24 PROCEDURE — 86850 RBC ANTIBODY SCREEN: CPT | Performed by: OBSTETRICS & GYNECOLOGY

## 2020-08-24 PROCEDURE — 87340 HEPATITIS B SURFACE AG IA: CPT | Performed by: OBSTETRICS & GYNECOLOGY

## 2020-08-24 PROCEDURE — G0145 SCR C/V CYTO,THINLAYER,RESCR: HCPCS | Performed by: OBSTETRICS & GYNECOLOGY

## 2020-08-24 PROCEDURE — 86762 RUBELLA ANTIBODY: CPT | Performed by: OBSTETRICS & GYNECOLOGY

## 2020-08-24 PROCEDURE — 86780 TREPONEMA PALLIDUM: CPT | Performed by: OBSTETRICS & GYNECOLOGY

## 2020-08-24 PROCEDURE — 81003 URINALYSIS AUTO W/O SCOPE: CPT | Performed by: OBSTETRICS & GYNECOLOGY

## 2020-08-24 PROCEDURE — 86901 BLOOD TYPING SEROLOGIC RH(D): CPT | Performed by: OBSTETRICS & GYNECOLOGY

## 2020-08-24 PROCEDURE — 87086 URINE CULTURE/COLONY COUNT: CPT | Performed by: OBSTETRICS & GYNECOLOGY

## 2020-08-24 PROCEDURE — 99207 ZZC FIRST OB VISIT: CPT | Performed by: OBSTETRICS & GYNECOLOGY

## 2020-08-24 PROCEDURE — 86900 BLOOD TYPING SEROLOGIC ABO: CPT | Performed by: OBSTETRICS & GYNECOLOGY

## 2020-08-24 PROCEDURE — 85027 COMPLETE CBC AUTOMATED: CPT | Performed by: OBSTETRICS & GYNECOLOGY

## 2020-08-24 PROCEDURE — 36415 COLL VENOUS BLD VENIPUNCTURE: CPT | Performed by: OBSTETRICS & GYNECOLOGY

## 2020-08-24 PROCEDURE — 87389 HIV-1 AG W/HIV-1&-2 AB AG IA: CPT | Performed by: OBSTETRICS & GYNECOLOGY

## 2020-08-24 NOTE — PROGRESS NOTES
Having nausea but much better on zofran and using twice a day. Dates by LMP c/w BSUS today showing fetal motion, 10w5d. Has first trimester screen scheduled for 9/2 and discussed AFP with next visit. Providers/residents, weight gain/exercise, delivery discussed.  She is starting this pregnancy had a heavier weight so discussed really trying to limit weight gain.  Pap smear done today and will check new OB labs. RTC in 5 weeks for AFP. BE    HPI:  Roshni Oliver is a 40 year old female Patient's last menstrual period was 06/13/2020. at 10w2d, Estimated Date of Delivery: Mar 20, 2021.  She denies vaginal bleeding and abdominal pain.  Happy about this pregnancy and was planned.   No other c/o.    Past Medical History:   Diagnosis Date     UTI (urinary tract infection) 05/11       Past Surgical History:   Procedure Laterality Date     DENTAL SURGERY  2004    wisdom teeth     HERNIA REPAIR, UMBILICAL  3 y/o, 1983       Allergies: Sulfa drugs     EXAM:  Blood pressure 116/74, pulse 93, temperature 98.4  F (36.9  C), temperature source Oral, weight 84.8 kg (187 lb), last menstrual period 06/13/2020, not currently breastfeeding.   BMI= Body mass index is 32.1 kg/m .  General - pleasant female in no acute distress.  Neck - supple without lymphadenopathy or thyromegaly.  Lungs - clear to auscultation bilaterally.  Heart - regular rate and rhythm without murmur.  Breast - no nodularity, asymmetry or nipple discharge bilaterally.  Abdomen - soft, nontender, nondistended, no hepatosplenomegaly.  Pelvic - EG: normal adult female, BUS: within normal limits, Vagina: well rugated, no discharge, Cervix: no lesions or CMT, closed/long Uterus: gravid, consistant with dates, mobile, Adnexae: no masses or tenderness.  Rectovaginal - deferred.  Musculoskeletal - no gross deformities.  Neurological - normal strength, sensation, and mental status.    Doptones were 168  BSUS shows luna IUP with cardiac activity and fetal motion measuring  consistent with dates    ASSESSMENT/PLAN:  (Z34.80) Supervision of other normal pregnancy, antepartum  (primary encounter diagnosis)  Comment: AMA  Plan: Has first trimester screen scheduled and discussed alpha-fetoprotein at next visit.  Will do level 2.  No questions    (Z12.4) Pap smear for cervical cancer screening  Plan: Pap imaged thin layer screen with HPV -         recommended age 30 - 65 years (select HPV order        below), HPV High Risk Types DNA Cervical        Discussed sending pap smear for HPV typing as well and that if both pap and HPV are negative, then can have q5 year pap smears.    Weight gain and exercise during pregnancy was discussed at today's visit.  The patient will return to clinic in 4 weeks for continued prenatal care.

## 2020-08-24 NOTE — LETTER
September 3, 2020    oRshni Oliver  [address] [address]    Dear ,  This letter is regarding your recent Pap smear (cervical cancer screening) and Human Papillomavirus (HPV) test.  We are happy to inform you that your Pap smear result is normal. Cervical cancer is closely linked with certain types of HPV. Your results showed no evidence of high-risk HPV.  We recommend you have your next PAP smear and HPV test in 5 years.  You will still need to return to the clinic every year for an annual exam and other preventive tests.  If you have additional questions regarding this result, please call our registered nurse, Ivet at [phone].  Sincerely,    DWIGHT LUIS MD //[facility]

## 2020-08-25 LAB
ABO + RH BLD: NORMAL
ABO + RH BLD: NORMAL
BACTERIA SPEC CULT: NORMAL
BLD GP AB SCN SERPL QL: NORMAL
BLOOD BANK CMNT PATIENT-IMP: NORMAL
Lab: NORMAL
SPECIMEN EXP DATE BLD: NORMAL
SPECIMEN SOURCE: NORMAL

## 2020-08-26 LAB
HBV SURFACE AG SERPL QL IA: NONREACTIVE
HIV 1+2 AB+HIV1 P24 AG SERPL QL IA: NONREACTIVE
RUBV IGG SERPL IA-ACNC: 10 IU/ML
T PALLIDUM AB SER QL: NONREACTIVE

## 2020-08-27 LAB
COPATH REPORT: NORMAL
PAP: NORMAL

## 2020-08-28 LAB
FINAL DIAGNOSIS: NORMAL
HPV HR 12 DNA CVX QL NAA+PROBE: NEGATIVE
HPV16 DNA SPEC QL NAA+PROBE: NEGATIVE
HPV18 DNA SPEC QL NAA+PROBE: NEGATIVE
SPECIMEN DESCRIPTION: NORMAL
SPECIMEN SOURCE CVX/VAG CYTO: NORMAL

## 2020-09-01 ENCOUNTER — PRE VISIT (OUTPATIENT)
Dept: MATERNAL FETAL MEDICINE | Facility: CLINIC | Age: 40
End: 2020-09-01

## 2020-09-02 ENCOUNTER — HOSPITAL ENCOUNTER (OUTPATIENT)
Dept: ULTRASOUND IMAGING | Facility: CLINIC | Age: 40
End: 2020-09-02
Attending: OBSTETRICS & GYNECOLOGY
Payer: COMMERCIAL

## 2020-09-02 ENCOUNTER — OFFICE VISIT (OUTPATIENT)
Dept: MATERNAL FETAL MEDICINE | Facility: CLINIC | Age: 40
End: 2020-09-02
Attending: OBSTETRICS & GYNECOLOGY
Payer: COMMERCIAL

## 2020-09-02 DIAGNOSIS — O09.521 MULTIGRAVIDA OF ADVANCED MATERNAL AGE IN FIRST TRIMESTER: Primary | ICD-10-CM

## 2020-09-02 DIAGNOSIS — O26.90 PREGNANCY RELATED CONDITION, ANTEPARTUM: ICD-10-CM

## 2020-09-02 PROCEDURE — 40000791 ZZHCL STATISTIC VERIFI PRENATAL TRISOMY 21,18,13: Performed by: OBSTETRICS & GYNECOLOGY

## 2020-09-02 PROCEDURE — 36415 COLL VENOUS BLD VENIPUNCTURE: CPT | Performed by: OBSTETRICS & GYNECOLOGY

## 2020-09-02 PROCEDURE — 76801 OB US < 14 WKS SINGLE FETUS: CPT | Performed by: OBSTETRICS & GYNECOLOGY

## 2020-09-02 PROCEDURE — 96040 ZZH GENETIC COUNSELING, EACH 30 MINUTES: CPT | Mod: ZF | Performed by: GENETIC COUNSELOR, MS

## 2020-09-02 PROCEDURE — 76813 OB US NUCHAL MEAS 1 GEST: CPT

## 2020-09-02 NOTE — PROGRESS NOTES
[facility]  Genetic Counseling Consult    Patient: Roshni Oliver YOB: 1980   Date of Service: 20      Roshni Oliver was seen at [facility] for genetic consultation to discuss the options for screening and testing for fetal chromosome abnormalities.  The indication for genetic counseling is advanced maternal age. The patient was accompanied by her partner, Juan to today's visit.        Impression/Plan:   1.  Roshni had an ultrasound and blood draw for NIPT (Innatal test through [facility]).  Results are expected within 7-10 days, and will be available in [employer].  We will contact her to discuss the results, and a copy will be forwarded to the office of the referring OB provider. Roshni provided verbal permission for results to be left on her voicemail. She requested the results do not include predicted fetal sex information.     2.  Maternal serum AFP (single marker screen) is recommended after 15 weeks to screen for open neural tube defects. A quad screen should not be performed.    3.  An 18-20 week comprehensive ultrasound is standard of care for all women 35 or older at delivery.    Pregnancy History:   /Parity:    Age at Delivery: 41 year old  ESEQUIEL: 3/20/2021, by Last Menstrual Period  Gestational Age: 11w4d    No significant complications or exposures were reported in the current pregnancy.    Roshni s pregnancy history is significant for two term deliveries of healthy girls and one SAB.    Medical History:   Roshni s reported medical history is not expected to impact pregnancy management or risks to fetal development.       Family History:   A three-generation pedigree was obtained, and is scanned under the  Media  tab.   No new significant findings were reported by Roshni and Juan.     Today we reviewed that Juan's two male maternal first cousins were reported to have retinitis pigmentosa.  Retinitis pigmentosa (RP) comprises a large group of inherited vision disorders that cause progressive degeneration of the retina, the light sensitive membrane that coats the inside of the eyes. RP can be inherited as autosomal recessive, autosomal dominant or X-linked fashion. They are unaware of any genetic testing. Based on the reported affected family members it appears that this condition may be passing in an autosomal recessive or X-linked fashion. In the case of X-linked inheritance the probability of having an affected child in this pregnancy is low since Juan is unaffected. In the case of autosomal recessive RP Juan would have a 1/4 chance of being a carrier. However, again the chances of having an affected child are low as both parents would need to be carriers. All forms of RP are not included on expanded carrier screening. If more information is gathered regarding these individuals, it would be reasonable to revisit this family history for a more accurate risk assessment.     Otherwise, the reported family history is negative for multiple miscarriages, stillbirths, birth defects, intellectual disability, known genetic conditions, and consanguinity.       Carrier Screening:   The patient reports that she and the father of the pregnancy have  ancestry:      Cystic fibrosis is an autosomal recessive genetic condition that occurs with increased frequency in individuals of  ancestry and carrier screening for this condition is available.  In addition,  screening in the [facility] includes cystic fibrosis.      Expanded carrier screening for mutations in a large panel of genes associated with autosomal recessive conditions including cystic fibrosis, spinal muscular atrophy, and others, is now available.      The patient has had previous carrier screening for 87 genetic conditions through the [facility] laboratory, the results of which were negative.  A copy of the report was  available for our review today.       Risk Assessment for Chromosome Conditions:   We explained that the risk for fetal chromosome abnormalities increases with maternal age. We discussed specific features of common chromosome abnormalities, including Down syndrome, trisomy 13, trisomy 18, and sex chromosome trisomies.      - At age 41 at midtrimester, the risk to have a baby with Down syndrome is 1 in 98.     - At age 41 at midtrimester, the risk to have a baby with any chromosome abnormality is 1 in 51.        Testing Options:   We discussed the following options:   First trimester screening    First trimester ultrasound with nuchal translucency and nasal bone assessments, maternal plasma hCG, PITER-A, and AFP measurement    Screens for fetal trisomy 21, trisomy 13, and trisomy 18    Cannot screen for open neural tube defects; maternal serum AFP after 15 weeks is recommended     Non-invasive Prenatal Testing (NIPT)    Maternal plasma cell-free DNA testing; first trimester ultrasound with nuchal translucency and nasal bone assessment is recommended, when appropriate    Screens for fetal trisomy 21, trisomy 13, trisomy 18, and sex chromosome aneuploidy    Cannot screen for open neural tube defects; maternal serum AFP after 15 weeks is recommended     Chorionic villus sampling (CVS)    Invasive procedure typically performed in the first trimester by which placental villi are obtained for the purpose of chromosome analysis and/or other prenatal genetic analysis    Diagnostic results; >99% sensitivity for fetal chromosome abnormalities    Cannot test for open neural tube defects; maternal serum AFP after 15 weeks is recommended     Genetic Amniocentesis    Invasive procedure typically performed in the second trimester by which amniotic fluid is obtained for the purpose of chromosome analysis and/or other prenatal genetic analysis    Diagnostic results; >99% sensitivity for fetal chromosome abnormalities    AFAFP  measurement tests for open neural tube defects     Comprehensive (Level II) ultrasound: Detailed ultrasound performed between 18-22 weeks gestation to screen for major birth defects and markers for aneuploidy.      We reviewed the benefits and limitations of this testing.  Screening tests provide a risk assessment specific to the pregnancy for certain fetal chromosome abnormalities, but cannot definitively diagnose or exclude a fetal chromosome abnormality.  Follow-up genetic counseling and consideration of diagnostic testing is recommended with any abnormal screening result.     Diagnostic tests carry inherent risks- including risk of miscarriage- that require careful consideration.  These tests can detect fetal chromosome abnormalities with greater than 99% certainty.  Results can be compromised by maternal cell contamination or mosaicism, and are limited by the resolution of cytogenetic G-banding technology.  There is no screening nor diagnostic test that can detect all forms of birth defects or mental disability.     It was a pleasure to be involved with Roshni s care. Face-to-face time of the meeting was 20 minutes.    Sammie Taylor MS, [facility]  Maternal Fetal Medicine  [facility]  Ph: [phone]  [email or web address]

## 2020-09-11 ENCOUNTER — TELEPHONE (OUTPATIENT)
Dept: MATERNAL FETAL MEDICINE | Facility: CLINIC | Age: 40
End: 2020-09-11

## 2020-09-11 LAB — LAB SCANNED RESULT: NORMAL

## 2020-09-11 NOTE — TELEPHONE ENCOUNTER
September 11, 2020  I spoke with Roshni regarding her NIPT results.     Results indicate NO ANEUPLOIDY DETECTED for chromosomes 21, 18, 13, or sex chromosomes. Fetal sex was NOT disclosed per patient request.      This puts her current pregnancy at low risk for Down syndrome, trisomy 18, trisomy 13 and sex chromosome abnormalities. This test is reported to have the following sensitivities: Down syndrome- >99.9%, trisomy 18- 97.4%, and trisomy 13- 87.5%. Although these results are reassuring, this does not replace a standard chromosome analysis from a chorionic villus sampling or amniocentesis.     Level II ultrasound is recommended, given AMA.     MSAFP is the appropriate second trimester screening test for open neural tube defects; the maternal quad screen is not recommended.    Her results are available in her Epic chart for her primary OB to review.     Sammie Taylor MS, [facility]  Genetic Counselor  Phone: [phone]  Pager: [phone]

## 2020-09-29 ENCOUNTER — PRENATAL OFFICE VISIT (OUTPATIENT)
Dept: OBGYN | Facility: CLINIC | Age: 40
End: 2020-09-29
Payer: COMMERCIAL

## 2020-09-29 VITALS
WEIGHT: 190.4 LBS | BODY MASS INDEX: 32.68 KG/M2 | SYSTOLIC BLOOD PRESSURE: 104 MMHG | DIASTOLIC BLOOD PRESSURE: 60 MMHG | HEART RATE: 77 BPM

## 2020-09-29 DIAGNOSIS — Z23 NEED FOR PROPHYLACTIC VACCINATION AND INOCULATION AGAINST INFLUENZA: ICD-10-CM

## 2020-09-29 DIAGNOSIS — Z34.80 SUPERVISION OF OTHER NORMAL PREGNANCY, ANTEPARTUM: Primary | ICD-10-CM

## 2020-09-29 PROCEDURE — 99000 SPECIMEN HANDLING OFFICE-LAB: CPT | Performed by: OBSTETRICS & GYNECOLOGY

## 2020-09-29 PROCEDURE — 36415 COLL VENOUS BLD VENIPUNCTURE: CPT | Performed by: OBSTETRICS & GYNECOLOGY

## 2020-09-29 PROCEDURE — 90471 IMMUNIZATION ADMIN: CPT | Performed by: OBSTETRICS & GYNECOLOGY

## 2020-09-29 PROCEDURE — 82105 ALPHA-FETOPROTEIN SERUM: CPT | Mod: 90 | Performed by: OBSTETRICS & GYNECOLOGY

## 2020-09-29 PROCEDURE — 99207 ZZC PRENATAL VISIT: CPT | Performed by: OBSTETRICS & GYNECOLOGY

## 2020-09-29 PROCEDURE — 90686 IIV4 VACC NO PRSV 0.5 ML IM: CPT | Performed by: OBSTETRICS & GYNECOLOGY

## 2020-09-29 RX ORDER — PRENATAL VIT/IRON FUM/FOLIC AC 27MG-0.8MG
1 TABLET ORAL DAILY
COMMUNITY
End: 2021-09-23

## 2020-09-29 NOTE — PROGRESS NOTES
Taking zofran twice a day and causing constipation. Taking stool softener. NIPT normal, did not find out gender. AFP today. Has u/s scheduled end of October. Flu shot today. Will plan phone call in 4 weeks and then in person for GCT. BE

## 2020-10-02 LAB
# FETUSES US: NORMAL
# FETUSES: NORMAL
AFP ADJ MOM AMN: 0.9
AFP SERPL-MCNC: 23 NG/ML
AGE - REPORTED: 39.1 YR
CURRENT SMOKER: NO
CURRENT SMOKER: NO
DIABETES STATUS PATIENT: NO
FAMILY MEMBER DISEASES HX: NO
FAMILY MEMBER DISEASES HX: NO
GA METHOD: NORMAL
GA METHOD: NORMAL
GA: NORMAL WK
IDDM PATIENT QL: NO
INTEGRATED SCN PATIENT-IMP: NORMAL
LMP START DATE: NORMAL
MONOCHORIONIC TWINS: NORMAL
SERVICE CMNT-IMP: NO
SPECIMEN DRAWN SERPL: NORMAL
VALPROIC/CARBAMAZEPINE STATUS: NO
WEIGHT UNITS: NORMAL

## 2020-10-27 ENCOUNTER — PRENATAL OFFICE VISIT (OUTPATIENT)
Dept: OBGYN | Facility: CLINIC | Age: 40
End: 2020-10-27
Payer: COMMERCIAL

## 2020-10-27 DIAGNOSIS — Z34.80 SUPERVISION OF OTHER NORMAL PREGNANCY, ANTEPARTUM: Primary | ICD-10-CM

## 2020-10-27 PROCEDURE — 99207 PR PRENATAL VISIT: CPT | Performed by: OBSTETRICS & GYNECOLOGY

## 2020-10-27 NOTE — PROGRESS NOTES
Phone visit for modified prenatal care for COVID--total time 9 min  Nausea is a little better. Still sick in am due to phlegm. Not sure if feeling movement yet. Has MFM u/s tomorrow and excited to see baby. Otherwise no c/o. RTC 4 weeks and GCT then. (has needed to do GTT twice before) BE

## 2020-10-28 ENCOUNTER — OFFICE VISIT (OUTPATIENT)
Dept: MATERNAL FETAL MEDICINE | Facility: CLINIC | Age: 40
End: 2020-10-28
Attending: OBSTETRICS & GYNECOLOGY
Payer: COMMERCIAL

## 2020-10-28 ENCOUNTER — HOSPITAL ENCOUNTER (OUTPATIENT)
Dept: ULTRASOUND IMAGING | Facility: CLINIC | Age: 40
End: 2020-10-28
Attending: OBSTETRICS & GYNECOLOGY
Payer: COMMERCIAL

## 2020-10-28 DIAGNOSIS — O35.9XX0 SUSPECTED FETAL ANOMALY, ANTEPARTUM, SINGLE OR UNSPECIFIED FETUS: Primary | ICD-10-CM

## 2020-10-28 DIAGNOSIS — O09.522 MULTIGRAVIDA OF ADVANCED MATERNAL AGE IN SECOND TRIMESTER: ICD-10-CM

## 2020-10-28 DIAGNOSIS — O09.521 MULTIGRAVIDA OF ADVANCED MATERNAL AGE IN FIRST TRIMESTER: ICD-10-CM

## 2020-10-28 PROCEDURE — 76811 OB US DETAILED SNGL FETUS: CPT

## 2020-10-28 PROCEDURE — 99207 PR NO CHARGE LOS: CPT | Performed by: OBSTETRICS & GYNECOLOGY

## 2020-10-28 PROCEDURE — 76811 OB US DETAILED SNGL FETUS: CPT | Mod: 26 | Performed by: OBSTETRICS & GYNECOLOGY

## 2020-11-29 NOTE — PROGRESS NOTES
24w2d  Doing well since last visit.  Feeling more reassured since she's starting to feel more fetal movement.  F/u MFM US today for inadequate heart views due to fetal positioning on initial scan.  GCT and hgb today.  Had to take 3hr GTT with two previous pregnancies, so hoping she doesn't have to this time!  RTC 4w.  Sonia Deras MD

## 2020-11-30 ENCOUNTER — HOSPITAL ENCOUNTER (OUTPATIENT)
Dept: ULTRASOUND IMAGING | Facility: CLINIC | Age: 40
End: 2020-11-30
Attending: OBSTETRICS & GYNECOLOGY
Payer: COMMERCIAL

## 2020-11-30 ENCOUNTER — PRENATAL OFFICE VISIT (OUTPATIENT)
Dept: OBGYN | Facility: CLINIC | Age: 40
End: 2020-11-30
Payer: COMMERCIAL

## 2020-11-30 ENCOUNTER — OFFICE VISIT (OUTPATIENT)
Dept: MATERNAL FETAL MEDICINE | Facility: CLINIC | Age: 40
End: 2020-11-30
Attending: OBSTETRICS & GYNECOLOGY
Payer: COMMERCIAL

## 2020-11-30 VITALS
SYSTOLIC BLOOD PRESSURE: 118 MMHG | DIASTOLIC BLOOD PRESSURE: 68 MMHG | BODY MASS INDEX: 35.19 KG/M2 | WEIGHT: 205 LBS | HEART RATE: 83 BPM

## 2020-11-30 DIAGNOSIS — Z34.80 SUPERVISION OF OTHER NORMAL PREGNANCY, ANTEPARTUM: ICD-10-CM

## 2020-11-30 DIAGNOSIS — O35.9XX0 SUSPECTED FETAL ANOMALY, ANTEPARTUM, SINGLE OR UNSPECIFIED FETUS: ICD-10-CM

## 2020-11-30 LAB
GLUCOSE 1H P 50 G GLC PO SERPL-MCNC: 126 MG/DL (ref 60–129)
HGB BLD-MCNC: 12 G/DL (ref 11.7–15.7)

## 2020-11-30 PROCEDURE — 99N1025 PR STATISTIC OBHBG - HEMOGLOBIN: Performed by: OBSTETRICS & GYNECOLOGY

## 2020-11-30 PROCEDURE — 82950 GLUCOSE TEST: CPT | Performed by: OBSTETRICS & GYNECOLOGY

## 2020-11-30 PROCEDURE — 76816 OB US FOLLOW-UP PER FETUS: CPT | Mod: 26 | Performed by: OBSTETRICS & GYNECOLOGY

## 2020-11-30 PROCEDURE — 76816 OB US FOLLOW-UP PER FETUS: CPT

## 2020-11-30 PROCEDURE — 99207 PR PRENATAL VISIT: CPT | Performed by: OBSTETRICS & GYNECOLOGY

## 2020-11-30 NOTE — PROGRESS NOTES
"Please see \"Imaging\" tab under \"Chart Review\" for details of today's US at the [facility].    Wilfrid Golden MD  Maternal-Fetal Medicine      "

## 2020-12-29 ENCOUNTER — PRENATAL OFFICE VISIT (OUTPATIENT)
Dept: OBGYN | Facility: CLINIC | Age: 40
End: 2020-12-29
Payer: COMMERCIAL

## 2020-12-29 VITALS
OXYGEN SATURATION: 98 % | TEMPERATURE: 98 F | HEART RATE: 98 BPM | WEIGHT: 202.9 LBS | HEIGHT: 64 IN | BODY MASS INDEX: 34.64 KG/M2 | DIASTOLIC BLOOD PRESSURE: 58 MMHG | SYSTOLIC BLOOD PRESSURE: 112 MMHG

## 2020-12-29 DIAGNOSIS — Z23 NEED FOR TDAP VACCINATION: Primary | ICD-10-CM

## 2020-12-29 PROCEDURE — 90715 TDAP VACCINE 7 YRS/> IM: CPT | Performed by: OBSTETRICS & GYNECOLOGY

## 2020-12-29 PROCEDURE — 99207 PR PRENATAL VISIT: CPT | Performed by: OBSTETRICS & GYNECOLOGY

## 2020-12-29 PROCEDURE — 90471 IMMUNIZATION ADMIN: CPT | Performed by: OBSTETRICS & GYNECOLOGY

## 2020-12-29 ASSESSMENT — MIFFLIN-ST. JEOR: SCORE: 1585.35

## 2020-12-29 NOTE — NURSING NOTE
Clinic Administered Medication Documentation      Injectable Medication Documentation    Patient was given tdap. Prior to medication administration, verified patients identity using patient s name and date of birth. Please see MAR and medication order for additional information. Patient instructed to remain in clinic for 15 minutes.      Was entire vial of medication used? Yes  Vial/Syringe: Single dose vial  Expiration Date:  11/6/22  Was this medication supplied by the patient? No

## 2020-12-29 NOTE — PROGRESS NOTES
Passed GCT this time! Just got a bulldog puppy.     tdap today. covid vaccine discussed.  Measuring S=D today. No c/o. RTC 4 weeks. BE    Plan on breastfeeding? Yes  Birthcontrol? oral contraceptive  Sex on ultrasound? did not determine  Circumsion? Yes  Peds doc? Pediatric [facility] young [facility] in [facility]

## 2021-01-15 ENCOUNTER — TELEPHONE (OUTPATIENT)
Dept: OBGYN | Facility: CLINIC | Age: 41
End: 2021-01-15

## 2021-01-15 NOTE — TELEPHONE ENCOUNTER
Forms received, completed, signed by provider, and faxed to number as directed by patient MyChart message.     Evelyn

## 2021-01-25 ENCOUNTER — PRENATAL OFFICE VISIT (OUTPATIENT)
Dept: OBGYN | Facility: CLINIC | Age: 41
End: 2021-01-25
Payer: COMMERCIAL

## 2021-01-25 VITALS
OXYGEN SATURATION: 99 % | DIASTOLIC BLOOD PRESSURE: 72 MMHG | HEIGHT: 64 IN | BODY MASS INDEX: 35.85 KG/M2 | SYSTOLIC BLOOD PRESSURE: 115 MMHG | HEART RATE: 91 BPM | WEIGHT: 210 LBS

## 2021-01-25 DIAGNOSIS — Z34.80 SUPERVISION OF OTHER NORMAL PREGNANCY, ANTEPARTUM: Primary | ICD-10-CM

## 2021-01-25 PROCEDURE — 99207 PR PRENATAL VISIT: CPT | Performed by: OBSTETRICS & GYNECOLOGY

## 2021-01-25 ASSESSMENT — MIFFLIN-ST. JEOR: SCORE: 1617.55

## 2021-01-25 NOTE — PROGRESS NOTES
Puppy is so cute. Already loida trained. Has FLMA paperwork today. Hoping to deliver a week early. Has next appt for phone, plan RTC 4 weeks and GBS then. BE

## 2021-01-26 ENCOUNTER — TELEPHONE (OUTPATIENT)
Dept: OBGYN | Facility: CLINIC | Age: 41
End: 2021-01-26

## 2021-01-26 NOTE — TELEPHONE ENCOUNTER
Forms received by provider from patient in appointment. Provider signed forms, forms completed by TC. Forms faxed to number listed on form.     Evelyn

## 2021-02-12 ENCOUNTER — PRENATAL OFFICE VISIT (OUTPATIENT)
Dept: OBGYN | Facility: CLINIC | Age: 41
End: 2021-02-12
Payer: COMMERCIAL

## 2021-02-12 DIAGNOSIS — Z34.80 SUPERVISION OF OTHER NORMAL PREGNANCY, ANTEPARTUM: Primary | ICD-10-CM

## 2021-02-12 PROCEDURE — 99207 PR PRENATAL VISIT: CPT | Performed by: OBSTETRICS & GYNECOLOGY

## 2021-02-12 NOTE — PROGRESS NOTES
Phone visit for modified prenatal care for COVID--3 min total  Is feeling well with good fetal movement.  Cannot believe only has 5 more weeks to go.  We will plan to return to clinic in 2 weeks and do do group B strep then, weekly visits thereafter. BE

## 2021-02-12 NOTE — Clinical Note
Can someone call this patient and have her with me in 2 weeks at [place] for prenatal, then weekly at [place] until delivery date?  Thanks Gretchen

## 2021-02-23 ENCOUNTER — PRENATAL OFFICE VISIT (OUTPATIENT)
Dept: OBGYN | Facility: CLINIC | Age: 41
End: 2021-02-23
Payer: COMMERCIAL

## 2021-02-23 VITALS
HEART RATE: 95 BPM | BODY MASS INDEX: 37.16 KG/M2 | DIASTOLIC BLOOD PRESSURE: 61 MMHG | WEIGHT: 216.5 LBS | OXYGEN SATURATION: 96 % | SYSTOLIC BLOOD PRESSURE: 109 MMHG

## 2021-02-23 DIAGNOSIS — Z34.80 SUPERVISION OF OTHER NORMAL PREGNANCY, ANTEPARTUM: Primary | ICD-10-CM

## 2021-02-23 LAB — HGB BLD-MCNC: 11.1 G/DL (ref 11.7–15.7)

## 2021-02-23 PROCEDURE — 99N1025 PR STATISTIC OBHBG - HEMOGLOBIN: Performed by: OBSTETRICS & GYNECOLOGY

## 2021-02-23 PROCEDURE — 36416 COLLJ CAPILLARY BLOOD SPEC: CPT | Performed by: OBSTETRICS & GYNECOLOGY

## 2021-02-23 PROCEDURE — 99207 PR PRENATAL VISIT: CPT | Performed by: OBSTETRICS & GYNECOLOGY

## 2021-02-23 PROCEDURE — 87653 STREP B DNA AMP PROBE: CPT | Performed by: OBSTETRICS & GYNECOLOGY

## 2021-02-24 LAB
GP B STREP DNA SPEC QL NAA+PROBE: NEGATIVE
SPECIMEN SOURCE: NORMAL

## 2021-03-02 ENCOUNTER — PRENATAL OFFICE VISIT (OUTPATIENT)
Dept: OBGYN | Facility: CLINIC | Age: 41
End: 2021-03-02
Payer: COMMERCIAL

## 2021-03-02 VITALS
OXYGEN SATURATION: 98 % | SYSTOLIC BLOOD PRESSURE: 127 MMHG | WEIGHT: 216 LBS | BODY MASS INDEX: 36.88 KG/M2 | HEART RATE: 101 BPM | HEIGHT: 64 IN | DIASTOLIC BLOOD PRESSURE: 65 MMHG

## 2021-03-02 DIAGNOSIS — Z34.80 SUPERVISION OF OTHER NORMAL PREGNANCY, ANTEPARTUM: Primary | ICD-10-CM

## 2021-03-02 PROCEDURE — 99207 PR PRENATAL VISIT: CPT | Performed by: OBSTETRICS & GYNECOLOGY

## 2021-03-02 RX ORDER — OXYCODONE AND ACETAMINOPHEN 5; 325 MG/1; MG/1
1 TABLET ORAL
Status: CANCELLED | OUTPATIENT
Start: 2021-03-02

## 2021-03-02 RX ORDER — NALOXONE HYDROCHLORIDE 0.4 MG/ML
0.2 INJECTION, SOLUTION INTRAMUSCULAR; INTRAVENOUS; SUBCUTANEOUS
Status: CANCELLED | OUTPATIENT
Start: 2021-03-02

## 2021-03-02 RX ORDER — OXYTOCIN 10 [USP'U]/ML
10 INJECTION, SOLUTION INTRAMUSCULAR; INTRAVENOUS
Status: CANCELLED | OUTPATIENT
Start: 2021-03-02

## 2021-03-02 RX ORDER — ONDANSETRON 2 MG/ML
4 INJECTION INTRAMUSCULAR; INTRAVENOUS EVERY 6 HOURS PRN
Status: CANCELLED | OUTPATIENT
Start: 2021-03-02

## 2021-03-02 RX ORDER — AMOXICILLIN 250 MG
1 CAPSULE ORAL DAILY
Qty: 100 TABLET | Refills: 0 | Status: SHIPPED | OUTPATIENT
Start: 2021-03-02 | End: 2021-09-23

## 2021-03-02 RX ORDER — NALOXONE HYDROCHLORIDE 0.4 MG/ML
0.4 INJECTION, SOLUTION INTRAMUSCULAR; INTRAVENOUS; SUBCUTANEOUS
Status: CANCELLED | OUTPATIENT
Start: 2021-03-02

## 2021-03-02 RX ORDER — OXYTOCIN/0.9 % SODIUM CHLORIDE 30/500 ML
100-340 PLASTIC BAG, INJECTION (ML) INTRAVENOUS CONTINUOUS PRN
Status: CANCELLED | OUTPATIENT
Start: 2021-03-02

## 2021-03-02 RX ORDER — METHYLERGONOVINE MALEATE 0.2 MG/ML
200 INJECTION INTRAVENOUS
Status: CANCELLED | OUTPATIENT
Start: 2021-03-02

## 2021-03-02 RX ORDER — IBUPROFEN 200 MG
800 TABLET ORAL
Status: CANCELLED | OUTPATIENT
Start: 2021-03-02

## 2021-03-02 RX ORDER — ACETAMINOPHEN 325 MG/1
650 TABLET ORAL EVERY 6 HOURS PRN
Qty: 100 TABLET | Refills: 0 | Status: SHIPPED | OUTPATIENT
Start: 2021-03-02 | End: 2021-09-23

## 2021-03-02 RX ORDER — IBUPROFEN 600 MG/1
600 TABLET, FILM COATED ORAL EVERY 6 HOURS PRN
Qty: 60 TABLET | Refills: 0 | Status: SHIPPED | OUTPATIENT
Start: 2021-03-02 | End: 2021-09-23

## 2021-03-02 RX ORDER — BREAST PUMP
EACH MISCELLANEOUS
Qty: 1 EACH | Refills: 0 | Status: SHIPPED | OUTPATIENT
Start: 2021-03-02 | End: 2021-09-23

## 2021-03-02 RX ORDER — SODIUM CHLORIDE, SODIUM LACTATE, POTASSIUM CHLORIDE, CALCIUM CHLORIDE 600; 310; 30; 20 MG/100ML; MG/100ML; MG/100ML; MG/100ML
INJECTION, SOLUTION INTRAVENOUS CONTINUOUS
Status: CANCELLED | OUTPATIENT
Start: 2021-03-02

## 2021-03-02 RX ORDER — FENTANYL CITRATE 50 UG/ML
50-100 INJECTION, SOLUTION INTRAMUSCULAR; INTRAVENOUS
Status: CANCELLED | OUTPATIENT
Start: 2021-03-02

## 2021-03-02 RX ORDER — LIDOCAINE 40 MG/G
CREAM TOPICAL
Status: CANCELLED | OUTPATIENT
Start: 2021-03-02

## 2021-03-02 RX ORDER — ACETAMINOPHEN 325 MG/1
650 TABLET ORAL EVERY 4 HOURS PRN
Status: CANCELLED | OUTPATIENT
Start: 2021-03-02

## 2021-03-02 RX ORDER — CARBOPROST TROMETHAMINE 250 UG/ML
250 INJECTION, SOLUTION INTRAMUSCULAR
Status: CANCELLED | OUTPATIENT
Start: 2021-03-02

## 2021-03-02 ASSESSMENT — MIFFLIN-ST. JEOR: SCORE: 1639.77

## 2021-03-02 NOTE — PROGRESS NOTES
GBS: Negative  Hemoglobin   Date Value Ref Range Status   02/23/2021 11.1 (L) 11.7 - 15.7 g/dL Final   ]    Breast pump rx: done  Labor orders: signed and held  Birth plan: plan epidural  Length of stay: discussed   Disability paperwork: completed  Resident involvement: discussed and agrees.  Discharge meds done : YES

## 2021-03-04 ENCOUNTER — HOSPITAL ENCOUNTER (OUTPATIENT)
Facility: CLINIC | Age: 41
End: 2021-03-04
Admitting: OBSTETRICS & GYNECOLOGY
Payer: COMMERCIAL

## 2021-03-07 ENCOUNTER — NURSE TRIAGE (OUTPATIENT)
Dept: NURSING | Facility: CLINIC | Age: 41
End: 2021-03-07

## 2021-03-07 ENCOUNTER — TRANSFERRED RECORDS (OUTPATIENT)
Dept: HEALTH INFORMATION MANAGEMENT | Facility: CLINIC | Age: 41
End: 2021-03-07

## 2021-03-07 ENCOUNTER — HOSPITAL ENCOUNTER (OUTPATIENT)
Facility: CLINIC | Age: 41
Discharge: HOME | End: 2021-03-07
Attending: OBSTETRICS & GYNECOLOGY | Admitting: OBSTETRICS & GYNECOLOGY
Payer: COMMERCIAL

## 2021-03-07 VITALS — DIASTOLIC BLOOD PRESSURE: 72 MMHG | RESPIRATION RATE: 18 BRPM | SYSTOLIC BLOOD PRESSURE: 121 MMHG | TEMPERATURE: 97.3 F

## 2021-03-07 PROBLEM — Z36.89 ENCOUNTER FOR TRIAGE IN PREGNANT PATIENT: Status: ACTIVE | Noted: 2021-03-07

## 2021-03-07 LAB
RUPTURE OF FETAL MEMBRANES BY ROM PLUS: NEGATIVE
SPECIMEN SOURCE: NORMAL
WET PREP SPEC: NORMAL

## 2021-03-07 PROCEDURE — G0463 HOSPITAL OUTPT CLINIC VISIT: HCPCS | Mod: 25

## 2021-03-07 PROCEDURE — 84112 EVAL AMNIOTIC FLUID PROTEIN: CPT | Performed by: OBSTETRICS & GYNECOLOGY

## 2021-03-07 PROCEDURE — 59025 FETAL NON-STRESS TEST: CPT

## 2021-03-07 PROCEDURE — 87210 SMEAR WET MOUNT SALINE/INK: CPT | Performed by: STUDENT IN AN ORGANIZED HEALTH CARE EDUCATION/TRAINING PROGRAM

## 2021-03-07 ASSESSMENT — ACTIVITIES OF DAILY LIVING (ADL)
FALL_HISTORY_WITHIN_LAST_SIX_MONTHS: NO
TOILETING_ISSUES: NO

## 2021-03-07 NOTE — PLAN OF CARE
Data: [facility] at 0207.   Reason for maternal/fetal assessment per patient is Fluid Leak  . Patient is a . Prenatal record reviewed.      OB History    Para Term  AB Living   4 2 2 0 1 2   SAB TAB Ectopic Multiple Live Births   1 0 0 0 2      # Outcome Date GA Lbr Yevgeniy/2nd Weight Sex Delivery Anes PTL Lv   4 Current            3 Term 18 38w0d 03:35 / 01:01 3.23 kg (7 lb 1.9 oz) F Vag-Spont EPI N ANNETTE      Name: Elowyn      Apgar1: 9  Apgar5: 9   2 SAB 17 9w0d          1 Term 16 40w3d 04:15 / 01:52 3.941 kg (8 lb 11 oz) F Vag-Spont EPI  ANNETTE      Name: Durga      Apgar1: 9  Apgar5: 9      Medical History:   Past Medical History:   Diagnosis Date     UTI (urinary tract infection)    . Gestational Age 38w1d. VSS. Cervix: 3cm.  Fetal movement present. Patient denies cramping, pelvic pressure, UTI symptoms, GI problems, bloody show, vaginal bleeding, edema, headache, visual disturbances, epigastric or URQ pain, abdominal pain, rupture of membranes. Support persons   Juan present.  Action: Verbal consent for EFM. Triage assessment completed. EFM applied for FHT. Uterine assessment for contractions. Fetal assessment: Presumed adequate fetal oxygenation documented (see flow record). Patient instructed to report change in fetal movement, vaginal leaking of fluid or bleeding, abdominal pain, or any concerns related to the pregnancy to her nurse/physician.   Response: Dr. Hernández informed of pt. Arrival and chief complaint. ROM plus was negative. Cervix unchanged over 2 checks. Wet prep collected Plan per provider is discharge home. Patient verbalized understanding of education and verbalized agreement with plan. Discharged ambulatory at 0455.

## 2021-03-07 NOTE — PROVIDER NOTIFICATION
03/07/21 0420   Provider Notification   Provider Name/Title Dr. Hernández   Method of Notification At Bedside   Request Evaluate in Person   Notification Reason SVE;Labor Status   SVE unchanged. Wet prep collected. Plan to discharge pt home.

## 2021-03-07 NOTE — TELEPHONE ENCOUNTER
"Onset of low pelvic pressure 7 hours ago, and steady low back pressure  At that time as well, She has not felt contractions. She has had some leaking of clear , sticky feeling fluid leaking. She has not been able to sleep now. Advised to be seen,and agrees to come to l/d.Called to  [place] charge nurse.     Reason for Disposition    Increased pressure in pelvic area    [1] Leakage of fluid from vagina AND [2] leakage started > 4 hours ago    Additional Information    Negative: Passed out (i.e., lost consciousness, collapsed and was not responding)    Negative: Shock suspected (e.g., cold/pale/clammy skin, too weak to stand, low BP, rapid pulse)    Negative: Difficult to awaken or acting confused (e.g., disoriented, slurred speech)    Negative: [1] SEVERE abdominal pain (e.g., excruciating) AND [2] constant AND [3] present > 1 hour    Negative: Severe bleeding (e.g., continuous red blood from vagina, or large blood clots)    Negative: Umbilical cord hanging out of the vagina (shiny, white, curled appearance, \"like telephone cord\")    Negative: Uncontrollable urge to push (i.e., feels like baby is coming out now)    Negative: Can see baby    Negative: Sounds like a life-threatening emergency to the triager    Negative: MODERATE-SEVERE abdominal pain    Negative: Contractions < 10 minutes apart for 1 hour (i.e., 6 or more contractions an hour)    Negative: [1] Contractions > 10 minutes apart AND [2] persist > 24 hours AND [3] no improvement using CARE ADVICE    Negative: [1] Contractions AND [2] any vaginal bleeding (including: red blood, clots, spotting, or pink/brown mucous)    Negative: Vaginal bleeding  (Exception: brief spotting after intercourse or pelvic exam, or slight pinkish or brownish mucous discharge)    Negative: Leakage of fluid from vagina    Negative: [1] Baby moving less today (e.g., kick count < 5 in 1 hour or < 10 in 2 hours) AND [2] pregnant 23 or more weeks    Negative: No movement of baby for 8 " "hours    Negative: Severe headache or headache that won't go away    Negative: New blurred vision or vision changes    Negative: Fever > 100.4 F (38.0 C)    Negative: Passed out (i.e., lost consciousness, collapsed and was not responding)    Negative: Shock suspected (e.g., cold/pale/clammy skin, too weak to stand, low BP, rapid pulse)    Negative: Difficult to awaken or acting confused (e.g., disoriented, slurred speech)    Negative: [1] SEVERE abdominal pain (e.g., excruciating) AND [2] constant AND [3] present > 1 hour    Negative: Severe bleeding (e.g., continuous red blood from vagina, or large blood clots)    Negative: Umbilical cord hanging out of the vagina (shiny, white, curled appearance, \"like telephone cord\")    Negative: Uncontrollable urge to push (i.e., feels like baby is coming out now)    Negative: Can see baby    Negative: Sounds like a life-threatening emergency to the triager    Negative: Pregnant < 37 weeks (i.e., )    Negative: [1] Uncertain delivery date AND [2] possibly pregnant < 37 weeks (i.e., )    Negative: [1] First baby (primipara) AND [2] contractions < 6 minutes apart  AND [3] present 2 hours    Negative: [1] History of prior delivery (multipara) AND [2] contractions < 10 minutes apart AND [3] present 1 hour    Negative: [1] History of rapid prior delivery AND [2] contractions < 10 minutes apart    Negative: [1] Leakage of fluid from vagina AND [2] green or brown in color    Answer Assessment - Initial Assessment Questions  1. ONSET: \"When did the symptoms begin?\"         5 hours   2. CONTRACTIONS: \"Describe the contractions that you are having.\" (e.g., duration, frequency, regularity, severity)      no  3. ESEQUIEL: \"What date are you expecting to deliver?\"      2021  4. PARITY: \"Have you had a baby before?\" If yes, \"How long did the labor last?\"      Yes 2 baby . 6 hours   5. FETAL MOVEMENT: \"Has the baby's movement decreased or changed significantly from normal?\"     " "  6. OTHER SYMPTOMS: \"Do you have any other symptoms?\" (e.g., leaking fluid from vagina, fever, hand/facial swelling)     Fluid , clear and sticky    Protocols used: PREGNANCY - LABOR - [id number], PREGNANCY - LABOR-A-AH      "

## 2021-03-07 NOTE — TELEPHONE ENCOUNTER
Last night she was in because she thought maybe she was in labor due to low back pain.  Everything fine and sent home. This morning she has blood in her urine, sharp pain at the beginning of the flow of urine, and bright red bleeding on toilet paper when wiping about the size of a quarter. Temp 98.3 tympanic, and she says her back pain is gone from last night. Baby moving well, and nothing leaking from her vagina. Caare advice is to see a care provider within 24 hours.     Mary Ann Gunderson RN/ [facility]        Additional Information    Negative: Shock suspected (e.g., cold/pale/clammy skin, too weak to stand, low BP, rapid pulse)    Negative: Sounds like a life-threatening emergency to the triager    Blood in the urine is main symptom    Negative: Shock suspected (e.g., cold/pale/clammy skin, too weak to stand, low BP, rapid pulse)    Negative: Sounds like a life-threatening emergency to the triager    Negative: Recent back or abdominal injury    Negative: Recent genital injury    Negative: [1] Unable to urinate (or only a few drops) > 4 hours AND [2] bladder feels very full (e.g., palpable bladder or strong urge to urinate)    Negative: Passing pure blood or large blood clots (i.e., size > a dime) (Exception: nakia or small strands)    Negative: Fever > 100.5 F (38.1 C)    Negative: Patient sounds very sick or weak to the triager    Negative: Side (flank) or back pain present    Pain or burning with passing urine    Protocols used: URINARY SYMPTOMS-A-AH, URINE - BLOOD IN-A-AH

## 2021-03-07 NOTE — DISCHARGE INSTRUCTIONS
Discharge Instruction for Undelivered Patients      You were seen for: Labor Assessment  We Consulted: Dr. Hernández  You had (Test or Medicine):Non stress test, wet prep     Diet:   As tolerated     Activity:  As tolerated     Call your provider if you notice:  Swelling in your face or increased swelling in your hands or legs.  Headaches that are not relieved by Tylenol (acetaminophen).  Changes in your vision (blurring: seeing spots or stars.)  Nausea (sick to your stomach) and vomiting (throwing up).   Weight gain of 5 pounds or more per week.  Heartburn that doesn't go away.  Signs of bladder infection: pain when you urinate (use the toilet), need to go more often and more urgently.  The bag of benson (rupture of membranes) breaks, or you notice leaking in your underwear.  Bright red blood in your underwear.  Abdominal (lower belly) or stomach pain.  For first baby: Contractions (tightening) less than 5 minutes apart for one hour or more.  Second (plus) baby: Contractions (tightening) less than 10 minutes apart and getting stronger.  *If less than 34 weeks: Contractions (tightenings) more than 6 times in one hour.  Increase or change in vaginal discharge (note the color and amount)      Follow-up:  As scheduled in the clinic

## 2021-03-07 NOTE — PROGRESS NOTES
[facility]  OB Triage Note    CC: Abdominal cramping, leaking of fluid    HPI: Roshni Oliver is a 41 year old  at 38w1d by LMP c/w 11w4d US, who presents with abdominal cramping and leaking of fluid. She reports feeling more abdominal and back cramping for the last few days, although not yet feeling like contractions. She describes the cramping as more similar to the symphysis-related and other joint pain she has had during the pregnancy, not as cyclic as contractions. She also reports a small amount of thin, clear discharge earlier today. Notes that she has had increased mucoid discharge for the last couple weeks and this was new and different. She denies vaginal bleeding, itching, burning, urinary or bowel symptoms. Reports normal fetal movement.    Obstetric Complications  1. AMA    O:  Patient Vitals for the past 24 hrs:   BP Temp Temp src Resp   21 0215 121/72 97.3  F (36.3  C) Oral 18     Gen: Well-appearing, NAD  CV: Regular rate, well perfused  Pulm: Breathing room air comfortably  Abd: Soft, gravid, non-tender  Back: Mild tenderness over SI joints bilaterally, otherwise non-tender, no CVA tenderness  Ext: 1+ LE edema b/l    Spec: Normal external genital architecture, vaginal mucosa without lesions or erythema, thin yellow mucoid discharge with some smaller adherent clumps on vaginal sidewalls. Cervix appears mildly dilated with physiologic-appearing mucus at external os  Cervix: 2-3/40/-3, unchanged over one hour    FHT: , mod leslie, accels present, no decels  [place]: 0-1 ctx in 10 mins    Labs:  Wet prep collected    A/P:  Roshni Oliver is a 41 year old  at 38w1d by LMP c/w 11w3d US here with abdominal cramping, vaginal discharge.    Abdominal cramping:  - Likely musculoskeletal in origin given similarity to other joint pain, unchanged cervix over one hour. Encouraged ongoing conservative measures with rest, Tylenol, heat prn    Vaginal discharge:  - Wet prep  collected, possibly consistent with yeast but mostly asymptomatic. Patient desires discharge and call with positive results    FWB:  - Category I FHT, reactive NST    Patient discussed with Dr. Deras, follow up within next week for prenatal care. Encouraged to call or return to triage with increased contractions or abdominal pain, vaginal bleeding, increased or changing leakage of fluid, or decreased fetal movement.    Yamini Hernández MD  Ob/Gyn Resident, PGY-3  03/07/21 4:31 AM

## 2021-03-11 ENCOUNTER — PRENATAL OFFICE VISIT (OUTPATIENT)
Dept: OBGYN | Facility: CLINIC | Age: 41
End: 2021-03-11
Payer: COMMERCIAL

## 2021-03-11 VITALS
DIASTOLIC BLOOD PRESSURE: 70 MMHG | HEIGHT: 64 IN | BODY MASS INDEX: 37.56 KG/M2 | WEIGHT: 220 LBS | SYSTOLIC BLOOD PRESSURE: 130 MMHG

## 2021-03-11 DIAGNOSIS — Z34.80 SUPERVISION OF OTHER NORMAL PREGNANCY, ANTEPARTUM: Primary | ICD-10-CM

## 2021-03-11 PROCEDURE — 99207 PR PRENATAL VISIT: CPT | Performed by: OBSTETRICS & GYNECOLOGY

## 2021-03-11 ASSESSMENT — MIFFLIN-ST. JEOR: SCORE: 1657.91

## 2021-03-11 NOTE — PROGRESS NOTES
38w5d  Went in for labor eval and rule out rupture this weekend. Feeling intermittent contractions since then. No bleeding or leaking. Active fetal movement.   RTC weekly, sooner PRN.  Sonia Cline MD

## 2021-03-15 ENCOUNTER — HOSPITAL ENCOUNTER (INPATIENT)
Facility: CLINIC | Age: 41
LOS: 1 days | Discharge: HOME HEALTH | End: 2021-03-16
Attending: OBSTETRICS & GYNECOLOGY | Admitting: OBSTETRICS & GYNECOLOGY
Payer: COMMERCIAL

## 2021-03-15 PROBLEM — Z34.80 SUPERVISION OF OTHER NORMAL PREGNANCY, ANTEPARTUM: Status: ACTIVE | Noted: 2020-08-24

## 2021-03-15 LAB
ABO + RH BLD: NORMAL
ABO + RH BLD: NORMAL
BASOPHILS # BLD AUTO: 0 10E9/L (ref 0–0.2)
BASOPHILS NFR BLD AUTO: 0.2 %
BLD GP AB SCN SERPL QL: NORMAL
BLOOD BANK CMNT PATIENT-IMP: NORMAL
DIFFERENTIAL METHOD BLD: ABNORMAL
EOSINOPHIL # BLD AUTO: 0.1 10E9/L (ref 0–0.7)
EOSINOPHIL NFR BLD AUTO: 1.4 %
ERYTHROCYTE [DISTWIDTH] IN BLOOD BY AUTOMATED COUNT: 13 % (ref 10–15)
HCT VFR BLD AUTO: 34.9 % (ref 35–47)
HGB BLD-MCNC: 11.2 G/DL (ref 11.7–15.7)
IMM GRANULOCYTES # BLD: 0 10E9/L (ref 0–0.4)
IMM GRANULOCYTES NFR BLD: 0.3 %
LABORATORY COMMENT REPORT: NORMAL
LYMPHOCYTES # BLD AUTO: 2.4 10E9/L (ref 0.8–5.3)
LYMPHOCYTES NFR BLD AUTO: 26.9 %
MCH RBC QN AUTO: 26.9 PG (ref 26.5–33)
MCHC RBC AUTO-ENTMCNC: 32.1 G/DL (ref 31.5–36.5)
MCV RBC AUTO: 84 FL (ref 78–100)
MONOCYTES # BLD AUTO: 0.5 10E9/L (ref 0–1.3)
MONOCYTES NFR BLD AUTO: 5.7 %
NEUTROPHILS # BLD AUTO: 5.8 10E9/L (ref 1.6–8.3)
NEUTROPHILS NFR BLD AUTO: 65.5 %
NRBC # BLD AUTO: 0 10*3/UL
NRBC BLD AUTO-RTO: 0 /100
PLATELET # BLD AUTO: 217 10E9/L (ref 150–450)
RBC # BLD AUTO: 4.17 10E12/L (ref 3.8–5.2)
SARS-COV-2 RNA RESP QL NAA+PROBE: NEGATIVE
SPECIMEN EXP DATE BLD: NORMAL
SPECIMEN SOURCE: NORMAL
T PALLIDUM AB SER QL: NONREACTIVE
WBC # BLD AUTO: 8.8 10E9/L (ref 4–11)

## 2021-03-15 PROCEDURE — 59400 OBSTETRICAL CARE: CPT | Performed by: OBSTETRICS & GYNECOLOGY

## 2021-03-15 PROCEDURE — 250N000013 HC RX MED GY IP 250 OP 250 PS 637: Performed by: OBSTETRICS & GYNECOLOGY

## 2021-03-15 PROCEDURE — 722N000001 HC LABOR CARE VAGINAL DELIVERY SINGLE

## 2021-03-15 PROCEDURE — 250N000011 HC RX IP 250 OP 636: Performed by: STUDENT IN AN ORGANIZED HEALTH CARE EDUCATION/TRAINING PROGRAM

## 2021-03-15 PROCEDURE — 258N000003 HC RX IP 258 OP 636: Performed by: STUDENT IN AN ORGANIZED HEALTH CARE EDUCATION/TRAINING PROGRAM

## 2021-03-15 PROCEDURE — G0463 HOSPITAL OUTPT CLINIC VISIT: HCPCS

## 2021-03-15 PROCEDURE — 87635 SARS-COV-2 COVID-19 AMP PRB: CPT | Performed by: STUDENT IN AN ORGANIZED HEALTH CARE EDUCATION/TRAINING PROGRAM

## 2021-03-15 PROCEDURE — 86850 RBC ANTIBODY SCREEN: CPT | Performed by: STUDENT IN AN ORGANIZED HEALTH CARE EDUCATION/TRAINING PROGRAM

## 2021-03-15 PROCEDURE — 250N000013 HC RX MED GY IP 250 OP 250 PS 637: Performed by: STUDENT IN AN ORGANIZED HEALTH CARE EDUCATION/TRAINING PROGRAM

## 2021-03-15 PROCEDURE — 120N000002 HC R&B MED SURG/OB UMMC

## 2021-03-15 PROCEDURE — 250N000009 HC RX 250: Performed by: STUDENT IN AN ORGANIZED HEALTH CARE EDUCATION/TRAINING PROGRAM

## 2021-03-15 PROCEDURE — 86901 BLOOD TYPING SEROLOGIC RH(D): CPT | Performed by: STUDENT IN AN ORGANIZED HEALTH CARE EDUCATION/TRAINING PROGRAM

## 2021-03-15 PROCEDURE — 85025 COMPLETE CBC W/AUTO DIFF WBC: CPT | Performed by: STUDENT IN AN ORGANIZED HEALTH CARE EDUCATION/TRAINING PROGRAM

## 2021-03-15 PROCEDURE — 86900 BLOOD TYPING SEROLOGIC ABO: CPT | Performed by: STUDENT IN AN ORGANIZED HEALTH CARE EDUCATION/TRAINING PROGRAM

## 2021-03-15 PROCEDURE — 250N000013 HC RX MED GY IP 250 OP 250 PS 637

## 2021-03-15 PROCEDURE — 86780 TREPONEMA PALLIDUM: CPT | Performed by: STUDENT IN AN ORGANIZED HEALTH CARE EDUCATION/TRAINING PROGRAM

## 2021-03-15 RX ORDER — MODIFIED LANOLIN
OINTMENT (GRAM) TOPICAL
Status: DISCONTINUED | OUTPATIENT
Start: 2021-03-15 | End: 2021-03-16 | Stop reason: HOSPADM

## 2021-03-15 RX ORDER — CARBOPROST TROMETHAMINE 250 UG/ML
250 INJECTION, SOLUTION INTRAMUSCULAR
Status: DISCONTINUED | OUTPATIENT
Start: 2021-03-15 | End: 2021-03-16 | Stop reason: HOSPADM

## 2021-03-15 RX ORDER — MORPHINE SULFATE 10 MG/ML
10 INJECTION, SOLUTION INTRAMUSCULAR; INTRAVENOUS ONCE
Status: COMPLETED | OUTPATIENT
Start: 2021-03-15 | End: 2021-03-15

## 2021-03-15 RX ORDER — FENTANYL CITRATE 50 UG/ML
50-100 INJECTION, SOLUTION INTRAMUSCULAR; INTRAVENOUS
Status: DISCONTINUED | OUTPATIENT
Start: 2021-03-15 | End: 2021-03-15

## 2021-03-15 RX ORDER — METHYLERGONOVINE MALEATE 0.2 MG/ML
200 INJECTION INTRAVENOUS
Status: DISCONTINUED | OUTPATIENT
Start: 2021-03-15 | End: 2021-03-15

## 2021-03-15 RX ORDER — CARBOPROST TROMETHAMINE 250 UG/ML
250 INJECTION, SOLUTION INTRAMUSCULAR
Status: DISCONTINUED | OUTPATIENT
Start: 2021-03-15 | End: 2021-03-15

## 2021-03-15 RX ORDER — AMOXICILLIN 250 MG
2 CAPSULE ORAL 2 TIMES DAILY
Status: DISCONTINUED | OUTPATIENT
Start: 2021-03-15 | End: 2021-03-16 | Stop reason: HOSPADM

## 2021-03-15 RX ORDER — OXYTOCIN 10 [USP'U]/ML
10 INJECTION, SOLUTION INTRAMUSCULAR; INTRAVENOUS
Status: DISCONTINUED | OUTPATIENT
Start: 2021-03-15 | End: 2021-03-16 | Stop reason: HOSPADM

## 2021-03-15 RX ORDER — ACETAMINOPHEN 325 MG/1
650 TABLET ORAL EVERY 4 HOURS PRN
Status: DISCONTINUED | OUTPATIENT
Start: 2021-03-15 | End: 2021-03-15

## 2021-03-15 RX ORDER — OXYTOCIN 10 [USP'U]/ML
10 INJECTION, SOLUTION INTRAMUSCULAR; INTRAVENOUS
Status: COMPLETED | OUTPATIENT
Start: 2021-03-15 | End: 2021-03-15

## 2021-03-15 RX ORDER — HYDROXYZINE HYDROCHLORIDE 25 MG/1
25 TABLET, FILM COATED ORAL EVERY 6 HOURS PRN
Status: DISCONTINUED | OUTPATIENT
Start: 2021-03-15 | End: 2021-03-15

## 2021-03-15 RX ORDER — NALOXONE HYDROCHLORIDE 0.4 MG/ML
0.2 INJECTION, SOLUTION INTRAMUSCULAR; INTRAVENOUS; SUBCUTANEOUS
Status: DISCONTINUED | OUTPATIENT
Start: 2021-03-15 | End: 2021-03-15

## 2021-03-15 RX ORDER — ACETAMINOPHEN 325 MG/1
650 TABLET ORAL EVERY 4 HOURS PRN
Status: DISCONTINUED | OUTPATIENT
Start: 2021-03-15 | End: 2021-03-16 | Stop reason: HOSPADM

## 2021-03-15 RX ORDER — OXYTOCIN/0.9 % SODIUM CHLORIDE 30/500 ML
1-24 PLASTIC BAG, INJECTION (ML) INTRAVENOUS CONTINUOUS
Status: DISCONTINUED | OUTPATIENT
Start: 2021-03-15 | End: 2021-03-15

## 2021-03-15 RX ORDER — MISOPROSTOL 200 UG/1
400 TABLET ORAL
Status: DISCONTINUED | OUTPATIENT
Start: 2021-03-15 | End: 2021-03-16 | Stop reason: HOSPADM

## 2021-03-15 RX ORDER — LIDOCAINE 40 MG/G
CREAM TOPICAL
Status: DISCONTINUED | OUTPATIENT
Start: 2021-03-15 | End: 2021-03-15

## 2021-03-15 RX ORDER — FENTANYL/BUPIVACAINE/NS/PF 2-1250MCG
PLASTIC BAG, INJECTION (ML) INJECTION
Status: DISCONTINUED
Start: 2021-03-15 | End: 2021-03-15 | Stop reason: HOSPADM

## 2021-03-15 RX ORDER — IBUPROFEN 800 MG/1
800 TABLET, FILM COATED ORAL
Status: DISCONTINUED | OUTPATIENT
Start: 2021-03-15 | End: 2021-03-15

## 2021-03-15 RX ORDER — BISACODYL 10 MG
10 SUPPOSITORY, RECTAL RECTAL DAILY PRN
Status: DISCONTINUED | OUTPATIENT
Start: 2021-03-17 | End: 2021-03-16 | Stop reason: HOSPADM

## 2021-03-15 RX ORDER — MISOPROSTOL 200 UG/1
TABLET ORAL
Status: COMPLETED
Start: 2021-03-15 | End: 2021-03-15

## 2021-03-15 RX ORDER — HYDROCORTISONE 2.5 %
CREAM (GRAM) TOPICAL 3 TIMES DAILY PRN
Status: DISCONTINUED | OUTPATIENT
Start: 2021-03-15 | End: 2021-03-16 | Stop reason: HOSPADM

## 2021-03-15 RX ORDER — ONDANSETRON 2 MG/ML
4 INJECTION INTRAMUSCULAR; INTRAVENOUS EVERY 6 HOURS PRN
Status: DISCONTINUED | OUTPATIENT
Start: 2021-03-15 | End: 2021-03-15

## 2021-03-15 RX ORDER — NALOXONE HYDROCHLORIDE 0.4 MG/ML
0.4 INJECTION, SOLUTION INTRAMUSCULAR; INTRAVENOUS; SUBCUTANEOUS
Status: DISCONTINUED | OUTPATIENT
Start: 2021-03-15 | End: 2021-03-15

## 2021-03-15 RX ORDER — LIDOCAINE HYDROCHLORIDE 10 MG/ML
INJECTION, SOLUTION EPIDURAL; INFILTRATION; INTRACAUDAL; PERINEURAL
Status: DISCONTINUED
Start: 2021-03-15 | End: 2021-03-15 | Stop reason: HOSPADM

## 2021-03-15 RX ORDER — METHYLERGONOVINE MALEATE 0.2 MG/ML
200 INJECTION INTRAVENOUS
Status: DISCONTINUED | OUTPATIENT
Start: 2021-03-15 | End: 2021-03-16 | Stop reason: HOSPADM

## 2021-03-15 RX ORDER — OXYTOCIN/0.9 % SODIUM CHLORIDE 30/500 ML
100-340 PLASTIC BAG, INJECTION (ML) INTRAVENOUS CONTINUOUS PRN
Status: DISCONTINUED | OUTPATIENT
Start: 2021-03-15 | End: 2021-03-15

## 2021-03-15 RX ORDER — IBUPROFEN 800 MG/1
800 TABLET, FILM COATED ORAL EVERY 6 HOURS PRN
Status: DISCONTINUED | OUTPATIENT
Start: 2021-03-15 | End: 2021-03-16 | Stop reason: HOSPADM

## 2021-03-15 RX ORDER — OXYCODONE AND ACETAMINOPHEN 5; 325 MG/1; MG/1
1 TABLET ORAL
Status: DISCONTINUED | OUTPATIENT
Start: 2021-03-15 | End: 2021-03-15

## 2021-03-15 RX ORDER — AMOXICILLIN 250 MG
1 CAPSULE ORAL 2 TIMES DAILY
Status: DISCONTINUED | OUTPATIENT
Start: 2021-03-15 | End: 2021-03-16 | Stop reason: HOSPADM

## 2021-03-15 RX ORDER — SODIUM CHLORIDE, SODIUM LACTATE, POTASSIUM CHLORIDE, CALCIUM CHLORIDE 600; 310; 30; 20 MG/100ML; MG/100ML; MG/100ML; MG/100ML
INJECTION, SOLUTION INTRAVENOUS CONTINUOUS
Status: DISCONTINUED | OUTPATIENT
Start: 2021-03-15 | End: 2021-03-15

## 2021-03-15 RX ORDER — OXYTOCIN/0.9 % SODIUM CHLORIDE 30/500 ML
100 PLASTIC BAG, INJECTION (ML) INTRAVENOUS CONTINUOUS
Status: DISCONTINUED | OUTPATIENT
Start: 2021-03-15 | End: 2021-03-16 | Stop reason: HOSPADM

## 2021-03-15 RX ORDER — HYDROXYZINE HYDROCHLORIDE 50 MG/1
50 TABLET, FILM COATED ORAL EVERY 6 HOURS PRN
Status: DISCONTINUED | OUTPATIENT
Start: 2021-03-15 | End: 2021-03-15

## 2021-03-15 RX ORDER — OXYTOCIN/0.9 % SODIUM CHLORIDE 30/500 ML
340 PLASTIC BAG, INJECTION (ML) INTRAVENOUS CONTINUOUS PRN
Status: DISCONTINUED | OUTPATIENT
Start: 2021-03-15 | End: 2021-03-16 | Stop reason: HOSPADM

## 2021-03-15 RX ADMIN — ACETAMINOPHEN 650 MG: 325 TABLET, FILM COATED ORAL at 23:58

## 2021-03-15 RX ADMIN — IBUPROFEN 800 MG: 800 TABLET ORAL at 21:16

## 2021-03-15 RX ADMIN — OXYTOCIN 10 UNITS: 10 INJECTION, SOLUTION INTRAMUSCULAR; INTRAVENOUS at 14:44

## 2021-03-15 RX ADMIN — OXYTOCIN-SODIUM CHLORIDE 0.9% IV SOLN 30 UNIT/500ML 2 MILLI-UNITS/MIN: 30-0.9/5 SOLUTION at 09:03

## 2021-03-15 RX ADMIN — HYDROXYZINE HYDROCHLORIDE 50 MG: 50 TABLET, FILM COATED ORAL at 04:49

## 2021-03-15 RX ADMIN — MORPHINE SULFATE 10 MG: 10 INJECTION, SOLUTION INTRAMUSCULAR; INTRAVENOUS at 04:54

## 2021-03-15 RX ADMIN — SODIUM CHLORIDE, POTASSIUM CHLORIDE, SODIUM LACTATE AND CALCIUM CHLORIDE 500 ML: 600; 310; 30; 20 INJECTION, SOLUTION INTRAVENOUS at 05:38

## 2021-03-15 RX ADMIN — IBUPROFEN 800 MG: 800 TABLET ORAL at 14:54

## 2021-03-15 RX ADMIN — DOCUSATE SODIUM 50 MG AND SENNOSIDES 8.6 MG 1 TABLET: 8.6; 5 TABLET, FILM COATED ORAL at 19:44

## 2021-03-15 RX ADMIN — MISOPROSTOL 400 MCG: 200 TABLET ORAL at 14:44

## 2021-03-15 RX ADMIN — SODIUM CHLORIDE, POTASSIUM CHLORIDE, SODIUM LACTATE AND CALCIUM CHLORIDE: 600; 310; 30; 20 INJECTION, SOLUTION INTRAVENOUS at 09:25

## 2021-03-15 NOTE — PROVIDER NOTIFICATION
03/15/21 0512   Provider Notification   Provider Name/Title Alejandro   Method of Notification Phone   Notification Reason Decels;Fetal Baseline Change   Asked provider to look at FHT due to wandering baseline or decel. Discussed prolong decel and repositioned patient from left side to right side. 500 ml fluid bolus and continue LR at 125 ml/hr while sleeping. Discussed starting pitocin this am after SVE.

## 2021-03-15 NOTE — PROGRESS NOTES
1418 maternal heart rate as pt is bending over onto the bed while on the birth ball 1429 pt into bed 10 cm, 1430 delivery

## 2021-03-15 NOTE — PLAN OF CARE
1430 baby girl. Placed on maternal abd.  Loss of IV with delivery Pitocin IM given.  Baby breastfeeding without assist within 30 minutes. Motrin given.  VSS. Vaginal delivery and feal debrief completed.

## 2021-03-15 NOTE — L&D DELIVERY NOTE
OB Vaginal Delivery Note    Roshni Oliver MRN# [id number]   Age: 41 year old YOB: 1980       GA: 39w2d  GP:   Labor Complications: None   EBL:   mL  Delivery QBL:    Delivery Type: Vaginal, Spontaneous   ROM to Delivery Time: rupture date or rupture time have not been documented   Weight:     1 Minute 5 Minute 10 Minute   Apgar Totals: 9   9             Vaginal Delivery Summary    39w2d            Stage 1:  Roshni Oliver is a 41 year old  at 39w2d who presented with latent labor. Labor augmentation with pitocin. GBS negative. SROM immediately prior to delivery, clear fluid. No epidural.     Stage 2:  Pushed over 2 contractions to deliver viable female infant on March 15, 2021 at 1430 from BARBARA position. Nuchal cord x1, delivered through then reduced. Anterior shoulder delivered with ease followed by posterior shoulder. Vigorous with spontaneous cry.   Put on mother's chest where delayed cord clamping was done. Apgars 9 at 1 min, 9 at 5min. Weight pending.    Stage 3:  Placenta delivered spontaneously with controlled traction on the cord, intact, 3 vessels. Pitocin IM and buccal misoprostol administered because IV access lost during delivery. EBL 100mL. Fundus firm at umbilicus.    No lacerations.   Mother and infant in stable condition.  No complications.  I was present for entire delivery of infant and placenta.    Sonia Cline MD          Delivery Details:  Roshni Oliver, a 41 year old  delivered a viable infant with apgars of 9  and 9 . Patient was fully dilated and pushing after   hours   minutes in active labor. Delivery was via vaginal, spontaneous  to a sterile field under   anesthesia. Infant delivered in vertex  left  occiput    position. Anterior and posterior shoulders delivered without difficulty. The cord was clamped, cut twice and 3 vessels  were noted. Cord blood was obtained in routine fashion with the following disposition: lab .      Cord  complications: nuchal   Placenta delivered at  . Placental disposition was Hospital disposal . Fundal massage performed and fundus found to be firm.     Episiotomy: none    Perineum, vagina, cervix were inspected, and the following lacerations were noted:   Perineal lacerations: none                Excellent hemostasis was noted. Needle count correct. Infant and patient in delivery room in good and stable condition.        Rebecca Oliver [[id number]]    Labor Events     labor?: No   steroids: None  Labor Type: Spontaneous, Augmentation  Predominate monitoring during 1st stage: continuous electronic fetal monitoring     Antibiotics received during labor?: No     Indications for augmentation: Ineffective Contraction Pattern  1:1 continuous labor support provided by?: none Labor partogram used?: no      Delivery/Placenta Date and Time    Delivery Date: 3/15/21 Delivery Time:  2:30 PM      Apgars    Living status: Living   1 Minute 5 Minute 10 Minute 15 Minute 20 Minute   Skin color: 1  1       Heart rate: 2  2       Reflex irritability: 2  2       Muscle tone: 2  2       Respiratory effort: 2  2       Total: 9  9       Apgars assigned by: FARHAT FRITZ RN     Cord    Vessels: 3 Vessels Complications: Nuchal   Cord Blood Disposition: Lab Gases Sent?: No      [place] Resuscitation    Methods: None  [place] Care at Delivery: Data: Female infant born at 1430.  Action: No interventions needed.  Spontaneous cry, stimulated, placed on mothers abdomen. Delayed cord clamping. Cord cut. Placed on mothers chest, warm blankets. applied, hat applied.  Response: Stable . Positive bonding behaviors observed.     Skin to Skin and Feeding Plan    Skin to skin initiation date/time: 1/3/1841    Skin to skin with: Mother  Skin to skin end date/time:        Labor Events and Shoulder Dystocia    Fetal Tracing Prior to Delivery: Category 2  Fetal Tracing Comments: variable decels  Shoulder dystocia present?:  Neg     Delivery (Maternal) (Provider to Complete) ([id number])    Episiotomy: None  Perineal lacerations: None       Blood Loss  Mother: Roshni Oliver #[id number]   Start of Mother's Information    IO Blood Loss  03/15/21 0230 - 03/15/21 1454    None           End of Mother's Information  Mother: Roshni Oliver #[id number]          Delivery - Provider to Complete ([id number])    Delivering clinician: Sonia Cline MD  Attempted Delivery Types (Choose all that apply): Spontaneous Vaginal Delivery  Delivery Type (Choose the 1 that will go to the Birth History): Vaginal, Spontaneous                                 Placenta    Delayed Cord Clamping: Done  Removal: Spontaneous  Comments: intact, eccentric cord insertion  Disposition: Hospital disposal           Presentation and Position    Presentation: Vertex    Position: Left Occiput                  Sonia Cline MD

## 2021-03-15 NOTE — PROVIDER NOTIFICATION
Pt. In SF position and FHR prolonged decel to 60's then back to 90 then down to 60's again with a slow 3min back to baseline. Pt to LL at 0907 then to RL  0910.  IV blous started and pitocin stopped. Educated patient and spouse on FHR statues and concerns. Pt. Verbalizes understanding and that she will need to be clear liquids only

## 2021-03-15 NOTE — PLAN OF CARE
Patient arrived to [facility] unit via wheelchair at 1655,with belongings, accompanied by spouse/ significant other, with infant in arms. Received report from ISAIAS Hancock and checked bands. Unit and room orientation completd. Call light given; no concerns present at this time. Continue with plan of care.

## 2021-03-15 NOTE — PROGRESS NOTES
Data: Roshni Oliver transferred to 73 via wheelchair at 1655. Baby transferred via parent's arms.  Action: Receiving unit notified of transfer: Yes. Patient and family notified of room change. Report given to Aram at 1700. Belongings sent to receiving unit. Accompanied by Registered Nurse. Oriented patient to surroundings. Call light within reach. ID bands double-checked with receiving RN.  Response: Patient tolerated transfer and is stable.

## 2021-03-15 NOTE — PROVIDER NOTIFICATION
03/15/21 0032   Provider Notification   Provider Name/Title Alejandro   Method of Notification Electronic Page   Request Evaluate in Person   Notification Reason Patient Arrived     Data: [facility] at 0002.   Reason for maternal/fetal assessment per patient is Rule Out Labor    Patient is a . Prenatal record reviewed.      OB History    Para Term  AB Living   4 2 2 0 1 2   SAB TAB Ectopic Multiple Live Births   1 0 0 0 2      # Outcome Date GA Lbr Yevgeniy/2nd Weight Sex Delivery Anes PTL Lv   4 Current            3 Term 18 38w0d 03:35 / 01:01 3.23 kg (7 lb 1.9 oz) F Vag-Spont EPI N ANNETTE      Name: Elowyn      Apgar1: 9  Apgar5: 9   2 SAB 17 9w0d          1 Term 16 40w3d 04:15 / 01:52 3.941 kg (8 lb 11 oz) F Vag-Spont EPI  ANNETTE      Name: [place]      Apgar1: 9  Apgar5: 9   . Medical history:   Past Medical History:   Diagnosis Date     UTI (urinary tract infection)      Gestational Age 39w2d. VSS. Fetal movement present. Patient reports light contractions becoming more frequent, q 5 minutes, starting around 2100 this evening. She reports occasional low pressure that causes discomfort and occasional mucous-like vaginal discharge, but denies backache, UTI symptoms, GI problems, bloody show, vaginal bleeding, edema, headache, visual disturbances, epigastric or URQ pain, abdominal pain, rupture of membranes. Support persons is present.    Action: Verbal consent for EFM. Triage assessment completed. EFM applied. FHR baseline 125-130s, moderate variability, accels present, late decel x 1 Uterine assessment ctx every 1-5 minutes, palpate mild. Fetal assessment: Presumed adequate fetal oxygenation documented (see flow record). SVE 3/30/-3.     Response: Dr. Allen informed of patient arrival. Plan per provider is continue monitoring and observation of patient at this time. Will plan to repeat SVE at 0230. Patient verbalized agreement with plan. Patient  ambulatory, oriented to room and call light. Report given to Phyllis JOHNSON RN. Care relinquished by writer at 0200.

## 2021-03-15 NOTE — PROGRESS NOTES
[facility]  Labor Progress Note    Subjective:  Patient feeling less with contractions. Trying to rest after morphine vistaril.     Objective:   No data found.  SVE: 3/50/-3    FHT: Baseline 110-120 - wandering baseline at times 105, mod variability, pos accelerations, occasional spontaneous decelerations  [place]: 2 contractions in 10 minutes    Assessment/Plan:  Ms. Roshni Oliver is a 41 year old  at 39w2d by LMP cw 10wk US, here with latent labor. Contractions now spaced, and she is making minimal cervical change. Recommended discharge to home versus augmentation. Will start augmentation with pitocin now.     - Augmentation of Labor               - Fen/GI: Clear liquid diet, IVF in active labor               - SVE: 3/50/-3               - Membranes: Intact               - Plan: Start pitocin               - Pain management: Desires epidural for analgesia               - Anticipate progression to      -Fetal Well Being               - Category 2 FHT with occasional decels, difficult to assess with wandering baseline. Had baseline change to 110s dipping to 105s for 10 min then change to 120s. Continues to have moderate variability and positive accels. Continue to monitor.                - Cephalic by BSUS. EFW 8lbs               - Continue EFM and [place]     - PNC:                - Rh pos. Rubella immune. GBS neg. No intervention indicated.                - Placenta: anterior    Jackeline Allen MD PGY2  Obstetrics & Gynecology  03/15/21

## 2021-03-15 NOTE — PROGRESS NOTES
OB PROGRESS NOTE    Called to room by RN for decels    S: I was notified by labor RN of category II fetal heart rate tracing.   Strip reviewed at bedside.  I came to the bedside to review with the RN.   Patient feeling mild contractions. No leaking or bleeding.     O:   Vitals:    03/15/21 0011 03/15/21 0236 03/15/21 0651   BP: 130/81 118/69 111/59   Resp: 18 16    Temp: 97.7  F (36.5  C) 98.1  F (36.7  C) 97.8  F (36.6  C)   TempSrc: Oral         Baseline rate 120, normal  Variability moderate  Accelerations not present  Decelerations present, deceleration type: prolonged, deceleration frequency: intermittent prolonged decel at 0905 for 5 min down to chilo of 60bpm    Assessment: EFM interpretation suggests absence of concern for fetal metabolic acidemia at this time due to moderate variability, isolated decel    Uterine Activity normal/regular.  4 contractions in 10 min  The interventions currently taken to improve the fetal heart rate tracing and fetal oxygenation are: maternal positioning, IV fluid bolus  and discontinue oxytocin     A: 41 year old  at 39w2d with latent labor. Category 2 fetal tracing due to isolated but prolonged deceleration in fetal heart rate.     P: Per the category II algorithm, the plan is to continue observe/conservative management. Reevaluate in 30 minutes. If category 1 for the next 30 min, plan to restart pitocin.    Sonia Cline MD

## 2021-03-15 NOTE — PROVIDER NOTIFICATION
03/15/21 0046   Provider Notification   Provider Name/Title Dr. Allen   Method of Notification At Bedside   Notification Reason SVE   Dr. Allen at bedside. SVE 3/30/-3.   Plan to recheck at 0230.

## 2021-03-15 NOTE — PROGRESS NOTES
[facility] PROGRESS NOTE:   March 15, 2021 11:23 AM         SUBJECTIVE:   Patient resting, mild contractions          OBJECTIVE:     Vitals:    03/15/21 0011 03/15/21 0236 03/15/21 0651 03/15/21 0835   BP: 130/81 118/69 111/59 118/74   Resp: 18 16     Temp: 97.7  F (36.5  C) 98.1  F (36.7  C) 97.8  F (36.6  C) 98  F (36.7  C)   TempSrc: Oral   Oral         NST:  Fetal Heart Rate Tracin bpm baseline, mod variability, + accels, no decels  Category 1    Tocometer: 2ctx in 10 min    SCE: deferred         LABS:     Recent Results (from the past 12 hour(s))   Asymptomatic SARS-CoV-2 COVID-19 Virus (Coronavirus) by PCR    Collection Time: 03/15/21  3:15 AM    Specimen: Nasopharyngeal   Result Value Ref Range    SARS-CoV-2 Virus Specimen Source Nasopharyngeal     SARS-CoV-2 PCR Result NEGATIVE     SARS-CoV-2 PCR Comment (Note)    ABO/Rh type and screen    Collection Time: 03/15/21  3:30 AM   Result Value Ref Range    ABO A     RH(D) Pos     Antibody Screen Neg     Test Valid Only At          [facility]    Specimen Expires 2021    CBC with platelets differential    Collection Time: 03/15/21  3:30 AM   Result Value Ref Range    WBC 8.8 4.0 - 11.0 10e9/L    RBC Count 4.17 3.8 - 5.2 10e12/L    Hemoglobin 11.2 (L) 11.7 - 15.7 g/dL    Hematocrit 34.9 (L) 35.0 - 47.0 %    MCV 84 78 - 100 fl    MCH 26.9 26.5 - 33.0 pg    MCHC 32.1 31.5 - 36.5 g/dL    RDW 13.0 10.0 - 15.0 %    Platelet Count 217 150 - 450 10e9/L    Diff Method Automated Method     % Neutrophils 65.5 %    % Lymphocytes 26.9 %    % Monocytes 5.7 %    % Eosinophils 1.4 %    % Basophils 0.2 %    % Immature Granulocytes 0.3 %    Nucleated RBCs 0 0 /100    Absolute Neutrophil 5.8 1.6 - 8.3 10e9/L    Absolute Lymphocytes 2.4 0.8 - 5.3 10e9/L    Absolute Monocytes 0.5 0.0 - 1.3 10e9/L    Absolute Eosinophils 0.1 0.0 - 0.7 10e9/L    Absolute Basophils 0.0 0.0 - 0.2 10e9/L    Abs Immature Granulocytes  0.0 0 - 0.4 10e9/L    Absolute Nucleated RBC 0.0    Treponema Abs w Reflex to RPR and Titer    Collection Time: 03/15/21  3:30 AM   Result Value Ref Range    Treponema Antibodies Nonreactive NR^Nonreactive              ASSESSMENT / PLAN:   41 year old  at 39w2d admitted in latent labor, augmenting with pitocin. Isolated prolonged decel at 0905, resolved and no further decels.    Labor: continue pitocin augmentation, currently at 4mu/min  Fetal well being: Category 1 tracing.   GBS: neg  Pain: epidural  Anticipate .    Sonia Cline MD

## 2021-03-15 NOTE — PLAN OF CARE
VSS and afebrile. Patient reports not leaking fluid or bleeding. Patient reports stronger contractions upon admission. PRN medications given and fluid bolus. Patient denies pain and resting comfortably. Will continue with plan of care

## 2021-03-15 NOTE — H&P
Obstetrics H&P Note  CC: Contractions    HPI:  Roshni Oliver is a 41 year old  at 39w2d by LMP c/w 10wk US who presents for evaluation of contractions.    This evening around 9-10pm, she started feeling like contractions were getting more painful. Not sure how often they are occurring, but feels they are stronger. Was in last weekend when she felt like she may have ruptured membranes, but was ruled out for rupture, and SVE was unchanged over 2 hours. She denies LOF today, just more mucousy discharge. Had bleeding with urination after last cervical exam, but none since. A lot of fetal movement.     She is otherwise feeling well and denies fevers, h/a, vision change, c/p, sob, epigastric or RUQ pain, n/v, worsening edema, urinary sx, or constipation.     Pregnancy complicated by:  - Obesity BMI 37  - AMA    ROS:  Negative except as mentioned in HPI.    OBHx:  OB History    Para Term  AB Living   4 2 2 0 1 2   SAB TAB Ectopic Multiple Live Births   1 0 0 0 2      # Outcome Date GA Lbr Yevgeniy/2nd Weight Sex Delivery Anes PTL Lv   4 Current            3 Term 18 38w0d 03:35 / 01:01 3.23 kg (7 lb 1.9 oz) F Vag-Spont EPI N ANNETTE      Name: Elowyn      Apgar1: 9  Apgar5: 9   2 SAB /17 9w0d          1 Term 16 40w3d 04:15 / 01:52 3.941 kg (8 lb 11 oz) F Vag-Spont EPI  ANNETTE      Name: Jansen      Apgar1: 9  Apgar5: 9         PMH:  Past Medical History:   Diagnosis Date     UTI (urinary tract infection)        PSHx:  Past Surgical History:   Procedure Laterality Date     DENTAL SURGERY      wisdom teeth     HERNIA REPAIR, UMBILICAL  3 y/o,        Medications:  No current facility-administered medications for this encounter.        Allergies:     Allergies   Allergen Reactions     Sulfa Drugs Itching     Itching behind ears       Physical Exam:   Vitals:    03/15/21 0011   BP: 130/81   Resp: 18   Temp: 97.7  F (36.5  C)   TempSrc: Oral      Gen: resting comfortably, in NAD  CV:  Regular rate and rhythm  Pulm: No increased work of breathing  Abd: soft, gravid, non-tender, non-distended    SVE: 3/30/-3, posterior, firm >> 3/50/-3, posterior, soft    NST:  FHT: , mod variability, pos accelerations, no decelerations  [place]end[place]: Q 3 in 10 min    Assessment/Plan: Roshni Oliver is a 41 year old  at 39w2d by LMP cw 10wk US, here with latent labor. Discussed options including return to home with return precautions, admit for expectant management, and admit for augmentation. At this point, patient with frequent contractions on toco making small cervical change, and getting more uncomfortable with contractions. Patient desires admission with expectant management, and if unchanged after several hours, augmentation. Otherwise, pregnancy is uncomplicated.     - Labor   - Admit to L&D for latent labor. Expectant management   - Fen/GI: Clear liquid diet, IVF in active labor   - SVE: 3/50/-3   - Membranes: Intact   - Plan: Expectant management, augmentation if not progressing   - Pain management: Desires epidural for analgesia   - Anticipate progression to     -Fetal Well Being   - Category 1 FHT. Reactive and reassuring   - Cephalic by BSUS. EFW 8lbs   - Continue EFM and Kendall    - PNC:    - Rh pos. Rubella immune. GBS neg. No intervention indicated.    - Placenta: anterior    Discussed with Dr. Andersen.     Jackeline Allen MD PGY2  Obstetrics & Gynecology  03/15/21     Physician Attestation   I, Carmen Andersen MD, personally examined and evaluated this patient.  I discussed the patient with the resident and care team, and agree with the assessment and plan of care as documented in the note above.  I personally reviewed vital signs, medications, labs, fetal heart tones and toco.  Key findings: Patient is here in early labor.  discussed option of discharge home vs admit and augmentation.  She would like to stay for labor augmentation and delivery. Fetal status is reassuring with a  reactive NST.  Ctx's have spaced out some so will begin pitocin augmentation.   Carmen Andersen MD   March 15, 2021

## 2021-03-15 NOTE — PROGRESS NOTES
[facility]  Labor Progress Note    S: Patient feel overall comfortable, contractions have spaced out.    O:   Patient Vitals for the past 4 hrs:   BP Temp   03/15/21 0651 111/59 97.8  F (36.6  C)     SVE: Last 3/50/-3    FHT: Baseline 120, moderate variability, accelerations present, no decelerations  [place]: 0-1 contractions in 10 minutes    Assessment/Plan: Roshni Oliver is a 41 year old  at 39w2d by LMP cw 10wk US, here with latent labor. Will start augmentation now.      #Latent Labor  - Admit to L&D for latent labor. Discussed augmentation at this time with Pitocin. Titrate per protocol.  - Fen/GI: Clear liquid diet, IVF in active labor  - SVE: 3/50/-3  - Membranes: Intact  - Pain management: Desires epidural for analgesia  - Anticipate progression to      #Fetal Well Being  - Category 1 FHT. Reactive and reassuring  - Cephalic by BSUS. EFW 8lbs  - Continue EFM and [place]     #Prenatal Care   - Rh pos. Rubella immune. GBS neg. No intervention indicated.   - Placenta: anterior    Lorie Flores MD  OB/GYN PGY-3  03/15/21 8:27 AM      Physician Attestation   I, Sonia Cline MD, personally examined and evaluated this patient.  I discussed the patient with the resident/fellow and care team, and agree with the assessment and plan of care as documented in the note of 3/15/21.      I personally reviewed vital signs, medications, labs and exam.    Key findings: 41 year old  at 39w2d here in latent labor. Discussed options - expectant management versus augmentation. Shared decision making - patient interested in augmentation with pitocin. Category 1 fetal tracing. Planning epidural. GBS neg. Anticipate .   Sonia Cline MD  Date of Service (when I saw the patient): 03/15/21

## 2021-03-15 NOTE — PLAN OF CARE
VSS and postpartum assessments WDL. Fundus firm, midline and at umbilicus. Light lochia. Ambulating with steady gait. Tolerating regular diet and voiding without difficulty. Bonding well with infant. Breastfeeding on cue independently. Denies pain and refused to take pain medication.  present and supportive. Will continue with postpartum cares and education per plan of care.

## 2021-03-16 VITALS
HEART RATE: 66 BPM | TEMPERATURE: 97.1 F | SYSTOLIC BLOOD PRESSURE: 120 MMHG | RESPIRATION RATE: 16 BRPM | DIASTOLIC BLOOD PRESSURE: 78 MMHG

## 2021-03-16 LAB — HGB BLD-MCNC: 11.2 G/DL (ref 11.7–15.7)

## 2021-03-16 PROCEDURE — 85018 HEMOGLOBIN: CPT | Performed by: OBSTETRICS & GYNECOLOGY

## 2021-03-16 PROCEDURE — 36415 COLL VENOUS BLD VENIPUNCTURE: CPT | Performed by: OBSTETRICS & GYNECOLOGY

## 2021-03-16 PROCEDURE — 250N000013 HC RX MED GY IP 250 OP 250 PS 637: Performed by: OBSTETRICS & GYNECOLOGY

## 2021-03-16 RX ADMIN — IBUPROFEN 800 MG: 800 TABLET ORAL at 09:37

## 2021-03-16 RX ADMIN — DOCUSATE SODIUM 50 MG AND SENNOSIDES 8.6 MG 1 TABLET: 8.6; 5 TABLET, FILM COATED ORAL at 08:09

## 2021-03-16 RX ADMIN — IBUPROFEN 800 MG: 800 TABLET ORAL at 15:45

## 2021-03-16 RX ADMIN — IBUPROFEN 800 MG: 800 TABLET ORAL at 03:16

## 2021-03-16 NOTE — PROGRESS NOTES
[facility]   Post-partum Note     Name:   Roshni Oliver  MRN:    [id number]     S: Roshni Oliver is 40 yo  PPD 1 after  without complications. She feels well this morning.  She has minimal pain (1/10), is voiding and defecating, and is ambulating reasonably well.  She is on a regular diet and her appetite has been good.  She reported some minor vaginal bleeding but no big clots.  No chest pain, shortness of breath, HA, dizziness, vision changes, abdominal pain, or swelling.  She wants to begin contraception with combined OCP, and was informed we can initiate this 6 weeks PP.  Currently taking ibuprofen prn and senna.       O:   Vitals:    03/15/21 1630 03/15/21 1657 03/15/21 1935 03/15/21 2341   BP: 113/75 124/66 129/74 116/69   Pulse: 89 79 85 77   Resp:  16 18 20   Temp:  98  F (36.7  C) 98.6  F (37  C) 98.4  F (36.9  C)   TempSrc:  Oral Oral Oral        Gen:      Resting comfortably, NAD  Pulm:    Normal work of breathing on room air   Abd:      Soft, appropriately ttp, non-distended.Fundus at 1cm below umbilicus, firm and non-tender.  Ext:       non-tender, no significant LE edema     I/O last 24 hrs:  In: -   Out: 625 Urine, Blood 232     Hgb:              Hemoglobin   Date Value Ref Range Status   03/15/2021 11.2 (L) 11.7 - 15.7 g/dL Final         Assessment/Plan:  Roshni Oliver 41 year old  on PPD #1 s/p . Pregnancy otherwise uncomplicated. Recovering appropriately postpartum. Discharge today per patient's request and satisfactory recovery.     #Postpartum management  Pain:     Well-controlled with ibuprofen and tylenol  Hgb:      11.2 on PPD 0; awaiting recheck from this morning  Rh:        Pos  Rubella: immune   Feed:    Breastfeeding well  BC:       Plan for combo OCP 6 weeks PP       Dispo:   JULIANA 3/16/21    -Ish Gupta, MS3  [facility] [employer]       Agree with note as edited above. Stable for discharge today after 24 hours  postpartum at 2pm.     Jackeline Allen MD PGY2  Obstetrics & Gynecology  03/15/21     Physician Attestation   I, Ankita Guadarrama MD, personally examined and evaluated this patient.  I discussed the patient with the resident/fellow and care team, and agree with the assessment and plan of care as documented in the note of 3/16/21.      I personally reviewed vital signs, medications, labs and exam.    Key findings: Doing well. Working on breastfeeding - latch is a little shallow.   May want to discharge this evening if baby's 24 hour evaluation is normal.   Ankita Guadarrama MD  Date of Service (when I saw the patient): 03/16/21

## 2021-03-16 NOTE — PROVIDER NOTIFICATION
03/16/21 1600   Provider Notification   Provider Name/Title Dr Guadarrama   Method of Notification Electronic Page   Request Evaluate-Remote   Notification Reason Other   Pt's infant's 24 hour tests complete.  Please place discharge order.  Thanks!

## 2021-03-16 NOTE — PLAN OF CARE
[id number]:  VSS and postpartum assessments WDL.  Up ad bart with steady gait and independent with cares.  Bonding well with infant.  Breastfeeding on cue independently with good latch observed, infant cluster feeding today.  Pain managed with ibuprofen per MAR.  EDS=5.  Breastpump provided and paperwork completed.  Pt has discharge medications at home already.  , Juan present and supportive.  Reviewed discharge instructions and answered all questions.  Discharged home with infant and all belongings at 1740.

## 2021-03-16 NOTE — PLAN OF CARE
Data: VSS, postpartum assessments WNL. She is voiding without difficulty, up ad bart, passing gas, eating and drinking normally. Perineum is intact, fundus firm at at midline, lochia WNL, no s/s infection. Breastfeeding infant independently. Pain managed with Tylenol, refused Ibuprofen, reports good pain management.   Action: Education provided on safe infant sleep and hand breast milk expression  Response: Pt is agreeable with her plan of care. Positive attachment behaviors observed with infant. Support person present at bedside and participating in cares. To continue with postpartum cares and education.

## 2021-03-16 NOTE — PLAN OF CARE
VSS. Postpartum assessment WNL. Voiding with no problems and able to ambulate independently. Breastfeeding with minimal assistance. Reports cramping but pain well controlled with ibuprofen and tylenol. No concerns at this time. Continue plan of care.

## 2021-03-16 NOTE — DISCHARGE SUMMARY
[facility]   Discharge Summary    Roshni Oliver MRN# [id number]   Age: 41 year old YOB: 1980     Date of Admission:  3/15/2021  Date of Discharge::  3/16/2021  Admitting Physician:  Carmen Andersen MD  Discharge Physician:  Ankita Guadarrama MD          Admission Diagnoses:   - IUP at 39w2d  - Obesity  - AMA  - Latent labor          Discharge Diagnosis:     - IUP at 39w2d, now delivered          Procedures:     Procedure(s): -             Medications Prior to Admission:     Medications Prior to Admission   Medication Sig Dispense Refill Last Dose     acetaminophen (TYLENOL) 325 MG tablet Take 2 tablets (650 mg) by mouth every 6 hours as needed for mild pain Start after Delivery. 100 tablet 0      ibuprofen (ADVIL/MOTRIN) 600 MG tablet Take 1 tablet (600 mg) by mouth every 6 hours as needed for moderate pain Start after delivery 60 tablet 0      Misc. Devices (BREAST PUMP) MISC Double electric breast pump 1 each 0      ondansetron (ZOFRAN ODT) 4 MG ODT tab Take 1 tablet (4 mg) by mouth every 6 hours as needed for nausea 60 tablet 3      Prenatal Vit-Fe Fumarate-FA (PRENATAL MULTIVITAMIN W/IRON) 27-0.8 MG tablet Take 1 tablet by mouth daily   Past Week at Unknown time     senna-docusate (SENOKOT-S/PERICOLACE) 8.6-50 MG tablet Take 1 tablet by mouth daily Start after delivery. 100 tablet 0              Discharge Medications:        Review of your medicines      CONTINUE these medicines which have NOT CHANGED      Dose / Directions   acetaminophen 325 MG tablet  Commonly known as: TYLENOL  Used for: Supervision of other normal pregnancy, antepartum      Dose: 650 mg  Take 2 tablets (650 mg) by mouth every 6 hours as needed for mild pain Start after Delivery.  Quantity: 100 tablet  Refills: 0     breast pump Misc  Used for: Supervision of other normal pregnancy, antepartum      Double electric breast pump  Quantity: 1 each  Refills: 0     ibuprofen 600 MG tablet  Commonly  known as: ADVIL/MOTRIN  Used for: Supervision of other normal pregnancy, antepartum      Dose: 600 mg  Take 1 tablet (600 mg) by mouth every 6 hours as needed for moderate pain Start after delivery  Quantity: 60 tablet  Refills: 0     ondansetron 4 MG ODT tab  Commonly known as: Zofran ODT  Used for: Hyperemesis gravidarum      Dose: 4 mg  Take 1 tablet (4 mg) by mouth every 6 hours as needed for nausea  Quantity: 60 tablet  Refills: 3     prenatal multivitamin w/iron 27-0.8 MG tablet      Dose: 1 tablet  Take 1 tablet by mouth daily  Refills: 0     senna-docusate 8.6-50 MG tablet  Commonly known as: SENOKOT-S/PERICOLACE  Used for: Supervision of other normal pregnancy, antepartum      Dose: 1 tablet  Take 1 tablet by mouth daily Start after delivery.  Quantity: 100 tablet  Refills: 0                    Consultations:   None          Brief Admission History   Roshni Oliver is a 41 year old  at 39w2d by LMP c/w 10wk US who presents for evaluation of contractions.     This evening around 9-10pm, she started feeling like contractions were getting more painful. Not sure how often they are occurring, but feels they are stronger. Was in last weekend when she felt like she may have ruptured membranes, but was ruled out for rupture, and SVE was unchanged over 2 hours. She denies LOF today, just more mucousy discharge. Had bleeding with urination after last cervical exam, but none since. A lot of fetal movement.      She is otherwise feeling well and denies fevers, h/a, vision change, c/p, sob, epigastric or RUQ pain, n/v, worsening edema, urinary sx, or constipation.          Brief Intrapartum Course:      Stage 1:  Roshni Oliver is a 41 year old  at 39w2d who presented with latent labor. Labor augmentation with pitocin. GBS negative. SROM immediately prior to delivery, clear fluid. No epidural.      Stage 2:  Pushed over 2 contractions to deliver viable female infant on March 15, 2021 at 1430 from [place]  position. Nuchal cord x1, delivered through then reduced. Anterior shoulder delivered with ease followed by posterior shoulder. Vigorous with spontaneous cry.   Put on mother's chest where delayed cord clamping was done. Apgars 9 at 1 min, 9 at 5min. Weight pending.     Stage 3:  Placenta delivered spontaneously with controlled traction on the cord, intact, 3 vessels. Pitocin IM and buccal misoprostol administered because IV access lost during delivery. EBL 100mL. Fundus firm at umbilicus.     No lacerations.   Mother and infant in stable condition.  No complications.  I was present for entire delivery of infant and placenta.     Sonia Cline MD           Hospital Course:   The patient's hospital course was unremarkable.  On discharge, her pain was well controlled. Vaginal bleeding is similar to peak menstrual flow.  Voiding without difficulty.  Ambulating well and tolerating a normal diet.  No fever.  Breastfeeding well.  Infant is stable. She was discharged on post-partum day #1.    Post-partum hemoglobin: Stable  Contraception: OCPs at 6 weeks  Rh positive, Rhogam not indicated  Rubella immune, MMR not indicated          Discharge Instructions and Follow-Up:     Discharge diet: Regular   Discharge activity: Pelvic rest for 6 weeks including no sexual intercourse, tampons, or douching.    Discharge follow-up: Follow up with your primary OB for a routine postpartum visit in 6 weeks           Discharge Disposition:     Discharged to home in stable condition      Jackeline Allen MD PGY2  Obstetrics & Gynecology  03/16/21        Physician Attestation   I, Ankita Guadarrama, saw and evaluated this patient prior to discharge.  I discussed the patient with the resident/fellow and agree with plan of care as documented in the note.      I personally reviewed vital signs, medications, labs and exam.    I personally spent 15 minutes on discharge activities.    Ankita Guadarrama MD  Date of Service  (when I saw the patient): 03/16/21

## 2021-03-18 ENCOUNTER — MEDICAL CORRESPONDENCE (OUTPATIENT)
Dept: HEALTH INFORMATION MANAGEMENT | Facility: CLINIC | Age: 41
End: 2021-03-18

## 2021-03-30 ENCOUNTER — MYC MEDICAL ADVICE (OUTPATIENT)
Dept: OBGYN | Facility: CLINIC | Age: 41
End: 2021-03-30

## 2021-03-30 NOTE — TELEPHONE ENCOUNTER
LVMTCB to David to provide him with additional info needed regarding patient's recent delivery. Left him with the clinic number.     If he calls back, please warm transfer him to me by sending me a message in Teams first.       Evelyn

## 2021-04-01 NOTE — TELEPHONE ENCOUNTER
Brandon called back and confirmed delivery date, admission date, and delivery type.     [facility]

## 2021-04-17 ENCOUNTER — HEALTH MAINTENANCE LETTER (OUTPATIENT)
Age: 41
End: 2021-04-17

## 2021-05-20 NOTE — PROGRESS NOTES
"Please see \"Imaging\" tab under \"Chart Review\" for details of today's US at the [facility].    Wilfrid Golden MD  Maternal-Fetal Medicine      "
Statement Selected

## 2021-09-23 ENCOUNTER — OFFICE VISIT (OUTPATIENT)
Dept: FAMILY MEDICINE | Facility: CLINIC | Age: 41
End: 2021-09-23
Payer: COMMERCIAL

## 2021-09-23 VITALS
HEART RATE: 73 BPM | WEIGHT: 192 LBS | BODY MASS INDEX: 32.96 KG/M2 | TEMPERATURE: 97.9 F | SYSTOLIC BLOOD PRESSURE: 111 MMHG | OXYGEN SATURATION: 98 % | RESPIRATION RATE: 18 BRPM | DIASTOLIC BLOOD PRESSURE: 72 MMHG

## 2021-09-23 DIAGNOSIS — L72.0 CYST OF SKIN AND SUBCUTANEOUS TISSUE: ICD-10-CM

## 2021-09-23 DIAGNOSIS — D22.9 ATYPICAL NEVI: Primary | ICD-10-CM

## 2021-09-23 PROCEDURE — 99213 OFFICE O/P EST LOW 20 MIN: CPT | Performed by: NURSE PRACTITIONER

## 2021-09-23 NOTE — PROGRESS NOTES
"    Assessment & Plan     Atypical nevi  Refer to derm for skni check,  - Adult Dermatology Referral; Future    Cyst of skin and subcutaneous tissue  Left lateral abd subcutaneous US pending.    - US Lower Extremity Non Vascular Left; Future           BMI:   Estimated body mass index is 32.96 kg/m  as calculated from the following:    Height as of 3/11/21: 1.626 m (5' 4\").    Weight as of this encounter: 87.1 kg (192 lb).   Weight management plan: Discussed healthy diet and exercise guidelines    See Patient Instructions    No follow-ups on file.    Sonia Ash, CASEY CNP  [facility]    Rudi Watkins is a 41 year old who presents for the following health issues     HPI     Concern - Lump on left side abdomen   Onset: years   Description:   Intensity: mild  Progression of Symptoms:  same  Accompanying Signs & Symptoms: hair loss  Previous history of similar problem: yes  Therapies tried and outcome: None    Seems tender when she is digging at it.    Never red or hot.    Has not drained    Review of Systems   Constitutional, HEENT, cardiovascular, pulmonary, GI, , musculoskeletal, neuro, skin, endocrine and psych systems are negative, except as otherwise noted.      Objective    /72 (BP Location: Left arm, Patient Position: Sitting, Cuff Size: Adult Regular)   Pulse 73   Temp 97.9  F (36.6  C) (Tympanic)   Resp 18   Wt 87.1 kg (192 lb)   SpO2 98%   BMI 32.96 kg/m    Body mass index is 32.96 kg/m .  Physical Exam   GENERAL: healthy, alert and no distress  NECK: no adenopathy, no asymmetry, masses, or scars and thyroid normal to palpation  RESP: lungs clear to auscultation - no rales, rhonchi or wheezes  CV: regular rate and rhythm, normal S1 S2, no S3 or S4, no murmur, click or rub, no peripheral edema and peripheral pulses strong  ABDOMEN: soft, nontender, without hepatosplenomegaly or masses, left lateral subcutaneous mass approx 1 cm with tenderness midline lateral " and bowel sounds normal  MS: no gross musculoskeletal defects noted, no edema

## 2021-10-02 ENCOUNTER — HEALTH MAINTENANCE LETTER (OUTPATIENT)
Age: 41
End: 2021-10-02

## 2021-10-11 ENCOUNTER — ANCILLARY PROCEDURE (OUTPATIENT)
Dept: ULTRASOUND IMAGING | Facility: CLINIC | Age: 41
End: 2021-10-11
Attending: NURSE PRACTITIONER
Payer: COMMERCIAL

## 2021-10-11 DIAGNOSIS — L72.0 CYST OF SKIN AND SUBCUTANEOUS TISSUE: ICD-10-CM

## 2021-10-11 PROCEDURE — 76882 US LMTD JT/FCL EVL NVASC XTR: CPT | Mod: LT | Performed by: RADIOLOGY

## 2022-05-14 ENCOUNTER — HEALTH MAINTENANCE LETTER (OUTPATIENT)
Age: 42
End: 2022-05-14

## 2022-08-08 NOTE — PROGRESS NOTES
LE swelling R>L with 1-2+ pitting edema. Will let me know if wants compression stockings. Discussed preeclampsia swelling different. FH S>D but last baby was 8lbs 12oz so expect this baby to be on larger side. Plan u/s with next visit. RTC 2 weeks. BE  
Resident

## 2023-01-14 ENCOUNTER — HEALTH MAINTENANCE LETTER (OUTPATIENT)
Age: 43
End: 2023-01-14

## 2023-06-02 ENCOUNTER — HEALTH MAINTENANCE LETTER (OUTPATIENT)
Age: 43
End: 2023-06-02

## 2023-07-13 ENCOUNTER — ANCILLARY PROCEDURE (OUTPATIENT)
Dept: GENERAL RADIOLOGY | Facility: CLINIC | Age: 43
End: 2023-07-13
Attending: PEDIATRICS
Payer: COMMERCIAL

## 2023-07-13 ENCOUNTER — OFFICE VISIT (OUTPATIENT)
Dept: PEDIATRICS | Facility: CLINIC | Age: 43
End: 2023-07-13
Payer: COMMERCIAL

## 2023-07-13 VITALS
HEART RATE: 81 BPM | TEMPERATURE: 99.6 F | BODY MASS INDEX: 30.38 KG/M2 | OXYGEN SATURATION: 100 % | WEIGHT: 177 LBS | SYSTOLIC BLOOD PRESSURE: 116 MMHG | DIASTOLIC BLOOD PRESSURE: 75 MMHG

## 2023-07-13 DIAGNOSIS — M54.2 NECK PAIN: Primary | ICD-10-CM

## 2023-07-13 DIAGNOSIS — Z12.31 VISIT FOR SCREENING MAMMOGRAM: ICD-10-CM

## 2023-07-13 DIAGNOSIS — D22.9 MULTIPLE NEVI: ICD-10-CM

## 2023-07-13 DIAGNOSIS — M54.2 NECK PAIN: ICD-10-CM

## 2023-07-13 PROCEDURE — 72040 X-RAY EXAM NECK SPINE 2-3 VW: CPT | Mod: TC | Performed by: RADIOLOGY

## 2023-07-13 PROCEDURE — 99204 OFFICE O/P NEW MOD 45 MIN: CPT | Performed by: PEDIATRICS

## 2023-07-13 ASSESSMENT — PAIN SCALES - GENERAL: PAINLEVEL: SEVERE PAIN (7)

## 2023-07-13 ASSESSMENT — ENCOUNTER SYMPTOMS: NECK PAIN: 1

## 2023-07-13 NOTE — PATIENT INSTRUCTIONS
"Xray today of your neck.    Physical therapy order today.  You will be called.    Try \"ball against the wall\" to help with trigger points in your upper back.    Heat to upper shoulders.   "

## 2023-07-13 NOTE — PROGRESS NOTES
"  Assessment & Plan     (M54.2) Neck pain  (primary encounter diagnosis)  Comment: xr ok.  Likely musculoskeletal. Has not tried any over the counter medications/therapy or physical therapy.  See PI  Plan: XR Cervical Spine 2/3 Views, Physical Therapy         Referral            (D22.9) Multiple nevi  Comment: patient request for full skin check  Plan: Adult Dermatology Referral            (Z12.31) Visit for screening mammogram  Comment: for upcoming physical   Plan: *MA Screening Digital Bilateral            Patient Instructions   Xray today of your neck.    Physical therapy order today.  You will be called.    Try \"ball against the wall\" to help with trigger points in your upper back.    Heat to upper shoulders.       Ana Britt MD  [facility] JOY Watkins is a 43 year old, presenting for the following health issues:  Neck Pain and Arm Pain (LEFT)        7/13/2023     1:58 PM   Additional Questions   Roomed by Katiuska patel CMA   Accompanied by N/A         7/13/2023     1:58 PM   Patient Reported Additional Medications   Patient reports taking the following new medications N/A     Neck Pain  Associated symptoms include neck pain.   Arm Pain  Associated symptoms include neck pain.   History of Present Illness       Reason for visit:  Arm and neck pain and general health concerns  Symptom onset:  More than a month  Symptoms include:  Arm pain and neck pain that wakes me at night and is sensitive to touch  Symptom intensity:  Severe  Symptom progression:  Worsening  Had these symptoms before:  Yes  Has tried/received treatment for these symptoms:  No  What makes it worse:  No  What makes it better:  No    She eats 2-3 servings of fruits and vegetables daily.She consumes 1 sweetened beverage(s) daily.She exercises with enough effort to increase her heart rate 10 to 19 minutes per day.  She exercises with enough effort to increase her heart rate 3 or less days per week.   She is " "taking medications regularly.     Still nursing 2 year old on her left side.     Pain feels when she's slept wrong. Now both areas sensitive to touch. In the past week it is now hard to fall asleep and wakes up from sleep. If lays on right feels \"pulling.\" Actually laying on left side helps.     She has not tried any over the counter medications yet or heat/ice.    Left arm asleep when wakes.             Review of Systems   Musculoskeletal: Positive for neck pain.            Objective    /75   Pulse 81   Temp 99.6  F (37.6  C) (Tympanic)   Wt 80.3 kg (177 lb)   LMP 06/30/2023 (Approximate)   SpO2 100%   Breastfeeding Yes   BMI 30.38 kg/m    Body mass index is 30.38 kg/m .  Physical Exam   MUSCULOSKELETAL: full active range of motion, strength 5/5 in upper extremity. Several tender trigger points upper left shoulder    Cervical XR -   IMPRESSION: Slight reversal of the normal cervical lordosis. No  obvious loss of vertebral body height. Mild degenerative endplate  changes and loss of disc height at C5-C6 and C6-C7. The base of the  dens is unremarkable on the odontoid view.               "

## 2023-07-18 ENCOUNTER — THERAPY VISIT (OUTPATIENT)
Dept: PHYSICAL THERAPY | Facility: CLINIC | Age: 43
End: 2023-07-18
Attending: PEDIATRICS
Payer: COMMERCIAL

## 2023-07-18 DIAGNOSIS — M54.2 NECK PAIN: ICD-10-CM

## 2023-07-18 PROCEDURE — 97162 PT EVAL MOD COMPLEX 30 MIN: CPT | Mod: GP | Performed by: PHYSICAL THERAPIST

## 2023-07-18 PROCEDURE — 97110 THERAPEUTIC EXERCISES: CPT | Mod: GP | Performed by: PHYSICAL THERAPIST

## 2023-07-18 PROCEDURE — 97140 MANUAL THERAPY 1/> REGIONS: CPT | Mod: GP | Performed by: PHYSICAL THERAPIST

## 2023-07-18 NOTE — PROGRESS NOTES
PHYSICAL THERAPY EVALUATION  Type of Visit: Evaluation    See electronic medical record for Abuse and Falls Screening details.    Subjective       Presenting condition or subjective complaint: Pt describes gradual progression of L upper arm pain that radiates up to neck and down to lower arm.  Symptoms have worsened and the last few nights it has been keeping her up at night even.  Pt had an xray which showed  Date of onset:      Relevant medical history:     Dates & types of surgery:      Prior diagnostic imaging/testing results: X-ray     Prior therapy history for the same diagnosis, illness or injury:        Living Environment  Social support:     Type of home:     Stairs to enter the home:         Ramp:     Stairs inside the home:         Help at home:    Equipment owned:       Employment:      Hobbies/Interests:      Patient goals for therapy:      Pain assessment: Pain present  Location: L neck/shoulder/arm/Ratin.5/10     Objective   CERVICAL SPINE EVALUATION  PAIN: Pain Frequency: constant  Pain is Worst: nighttime  Pain is Exacerbated By: laughing, coughing, breastfeeding  Pain is Relieved By: none  INTEGUMENTARY (edema, incisions):   POSTURE: Sitting Posture: Forward head, Lordosis decreased  GAIT:   Weightbearing Status:   Assistive Device(s):   Gait Deviations:   BALANCE/PROPRIOCEPTION:   WEIGHTBEARING ALIGNMENT:   ROM:   (Degrees) Left AROM Right AROM    Cervical Flexion Min/mod loss, erp    Cervical Extension Major loss, erp    Cervical Side bend Min loss, pdm Min loss, pdm    Cervical Rotation Mod loss Mod loss    Cervical Protrusion wnl    Cervical Retraction Mod loss erp    Thoracic Flexion     Thoracic Extension Mod loss erp    Thoracic Rotation       Left AROM Left PROM Right AROM Right PROM   Shoulder Flexion       Shoulder Extension       Shoulder Abduction       Shoulder Adduction       Shoulder IR       Shoulder ER       Shoulder Horiz Abduction       Shoulder Horiz Adduction       Pain:    End Feel:     MYOTOMES: WNL  DTR S:   CORD SIGNS:   DERMATOMES:   NEURAL TENSION:   FLEXIBILITY:    SPECIAL TESTS:   PALPATION: L pec minor, L UT  SPINAL SEGMENTAL CONCLUSIONS:       Assessment & Plan   CLINICAL IMPRESSIONS  Medical Diagnosis:      Treatment Diagnosis:     Impression/Assessment: Patient is a 43 year old female with neck/L arm complaints.  The following significant findings have been identified: Pain, Decreased ROM/flexibility, Impaired muscle performance, Decreased activity tolerance and Impaired posture. These impairments interfere with their ability to perform self care tasks, recreational activities, household chores, driving , household mobility and community mobility as compared to previous level of function.     Clinical Decision Making (Complexity):  Clinical Presentation: Evolving/Changing  Clinical Presentation Rationale: based on medical and personal factors listed in PT evaluation  Clinical Decision Making (Complexity): Moderate complexity    PLAN OF CARE  Treatment Interventions:  Interventions: Manual Therapy, Neuromuscular Re-education, Therapeutic Activity, Therapeutic Exercise, Self-Care/Home Management    Long Term Goals            Frequency of Treatment:    Duration of Treatment:        Education Assessment:        Risks and benefits of evaluation/treatment have been explained.   Patient/Family/caregiver agrees with Plan of Care.     Evaluation Time:             Signing Clinician: Alberto Spears, PT

## 2023-08-01 ENCOUNTER — THERAPY VISIT (OUTPATIENT)
Dept: PHYSICAL THERAPY | Facility: CLINIC | Age: 43
End: 2023-08-01
Payer: COMMERCIAL

## 2023-08-01 DIAGNOSIS — M54.2 NECK PAIN: Primary | ICD-10-CM

## 2023-08-01 PROCEDURE — 97110 THERAPEUTIC EXERCISES: CPT | Mod: GP | Performed by: PHYSICAL THERAPIST

## 2023-08-01 PROCEDURE — 97140 MANUAL THERAPY 1/> REGIONS: CPT | Mod: GP | Performed by: PHYSICAL THERAPIST

## 2023-08-14 ENCOUNTER — E-VISIT (OUTPATIENT)
Dept: PEDIATRICS | Facility: CLINIC | Age: 43
End: 2023-08-14
Payer: COMMERCIAL

## 2023-08-14 DIAGNOSIS — M54.2 NECK PAIN: Primary | ICD-10-CM

## 2023-08-14 PROCEDURE — 99421 OL DIG E/M SVC 5-10 MIN: CPT | Performed by: PEDIATRICS

## 2023-08-14 NOTE — PATIENT INSTRUCTIONS
Andrae Watkins,    Thanks for reaching out. If physical therapy really isn't helping your symptoms, then we should have you see spine/ortho. Sometimes they can do an injection around the painful site that can help with pain relief.    I have placed a referral:    [facility] will call you to coordinate your care as prescribed by your provider. If you don't hear from a representative within 2 business days, please call [phone].    Ana Britt MD  Internal Medicine/Pediatrics  [facility]

## 2023-08-15 ENCOUNTER — ANCILLARY PROCEDURE (OUTPATIENT)
Dept: MAMMOGRAPHY | Facility: CLINIC | Age: 43
End: 2023-08-15
Payer: COMMERCIAL

## 2023-08-15 ENCOUNTER — THERAPY VISIT (OUTPATIENT)
Dept: PHYSICAL THERAPY | Facility: CLINIC | Age: 43
End: 2023-08-15
Payer: COMMERCIAL

## 2023-08-15 DIAGNOSIS — M54.2 NECK PAIN: Primary | ICD-10-CM

## 2023-08-15 DIAGNOSIS — Z12.31 VISIT FOR SCREENING MAMMOGRAM: ICD-10-CM

## 2023-08-15 PROCEDURE — 97140 MANUAL THERAPY 1/> REGIONS: CPT | Mod: GP | Performed by: PHYSICAL THERAPIST

## 2023-08-15 PROCEDURE — 97110 THERAPEUTIC EXERCISES: CPT | Mod: GP | Performed by: PHYSICAL THERAPIST

## 2023-08-15 PROCEDURE — 77067 SCR MAMMO BI INCL CAD: CPT | Mod: TC | Performed by: RADIOLOGY

## 2023-08-25 ENCOUNTER — THERAPY VISIT (OUTPATIENT)
Dept: PHYSICAL THERAPY | Facility: CLINIC | Age: 43
End: 2023-08-25
Payer: COMMERCIAL

## 2023-08-25 DIAGNOSIS — M54.2 NECK PAIN: Primary | ICD-10-CM

## 2023-08-25 PROCEDURE — 97110 THERAPEUTIC EXERCISES: CPT | Mod: GP | Performed by: PHYSICAL THERAPIST

## 2023-08-25 PROCEDURE — 97140 MANUAL THERAPY 1/> REGIONS: CPT | Mod: GP | Performed by: PHYSICAL THERAPIST

## 2023-08-30 ENCOUNTER — OFFICE VISIT (OUTPATIENT)
Dept: PEDIATRICS | Facility: CLINIC | Age: 43
End: 2023-08-30
Payer: COMMERCIAL

## 2023-08-30 VITALS
RESPIRATION RATE: 16 BRPM | SYSTOLIC BLOOD PRESSURE: 103 MMHG | HEART RATE: 80 BPM | TEMPERATURE: 98 F | OXYGEN SATURATION: 100 % | DIASTOLIC BLOOD PRESSURE: 63 MMHG | BODY MASS INDEX: 30.14 KG/M2 | WEIGHT: 175.6 LBS

## 2023-08-30 DIAGNOSIS — M54.2 NECK PAIN: Primary | ICD-10-CM

## 2023-08-30 PROBLEM — Z23 NEED FOR TDAP VACCINATION: Status: RESOLVED | Noted: 2020-08-06 | Resolved: 2023-08-30

## 2023-08-30 PROCEDURE — 99213 OFFICE O/P EST LOW 20 MIN: CPT | Performed by: PEDIATRICS

## 2023-08-30 RX ORDER — IBUPROFEN 800 MG/1
800 TABLET, FILM COATED ORAL 2 TIMES DAILY
COMMUNITY

## 2023-08-30 ASSESSMENT — PAIN SCALES - GENERAL: PAINLEVEL: MODERATE PAIN (5)

## 2023-08-30 NOTE — PROGRESS NOTES
Assessment & Plan     (M54.2) Neck pain  (primary encounter diagnosis)  Comment: improving   Plan: discussed cutting back on ibuprofen, ensure taking with food if using    Patient to follow up with ortho    Ana Britt MD  [facility] JOY Watkins is a 43 year old, presenting for the following health issues:  RECHECK (Pinched nerve on neck - pt stated that there has been improvement )      8/30/2023     4:47 PM   Additional Questions   Roomed by GRACIE Biggs   Accompanied by JULI         8/30/2023     4:47 PM   Patient Reported Additional Medications   Patient reports taking the following new medications JULI       History of Present Illness       Reason for visit:  Establish care and pinched nerve    She eats 2-3 servings of fruits and vegetables daily.She consumes 1 sweetened beverage(s) daily.She exercises with enough effort to increase her heart rate 9 or less minutes per day.  She exercises with enough effort to increase her heart rate 3 or less days per week.   She is taking medications regularly.     Last visit 7/13/23 - has been doing physical therapy for neck pain since then getting better - she plans to keep ortho appt in 2 weeks.  She has been doing physical therapy. Pain is pretty good during the day (takes four advil per day), but nighttime is hard. Hasn't waken her up the past few nights, but does wake her up earlier than normal. She also takes four advil before bed (total 1600mg per day). She has noted neck range of motion is better after physical therapy.     Strength has always remained intact.     She did do chiro which helped as well - this is too far in [place], so is just doing physical therapy right now.     Three kids 2, 5, 7 (just stopped nursing 3 y/o and this has helped too with pain).     Derm visit upcoming in Jan.     Patient did evisit 8/14/23  Andrae Watkins,     Thanks for reaching out. If physical therapy really isn't helping your symptoms, then we should  have you see spine/ortho. Sometimes they can do an injection around the painful site that can help with pain relief.     I have placed a referral:     [facility] will call you to coordinate your care as prescribed by your provider. If you don't hear from a representative within 2 business days, please call [phone].     Ana Britt MD  Internal Medicine/Pediatrics  [facility]                  Review of Systems         Objective    /63   Pulse 80   Temp 98  F (36.7  C) (Oral)   Resp 16   Wt 79.7 kg (175 lb 9.6 oz)   LMP 08/14/2023 (Within Days)   SpO2 100%   Breastfeeding No   BMI 30.14 kg/m    Body mass index is 30.14 kg/m .  Physical Exam

## 2023-09-01 ENCOUNTER — THERAPY VISIT (OUTPATIENT)
Dept: PHYSICAL THERAPY | Facility: CLINIC | Age: 43
End: 2023-09-01
Payer: COMMERCIAL

## 2023-09-01 DIAGNOSIS — M54.2 NECK PAIN: Primary | ICD-10-CM

## 2023-09-01 PROCEDURE — 97140 MANUAL THERAPY 1/> REGIONS: CPT | Mod: GP | Performed by: PHYSICAL THERAPIST

## 2023-09-01 PROCEDURE — 97110 THERAPEUTIC EXERCISES: CPT | Mod: GP | Performed by: PHYSICAL THERAPIST

## 2023-09-15 ENCOUNTER — THERAPY VISIT (OUTPATIENT)
Dept: PHYSICAL THERAPY | Facility: CLINIC | Age: 43
End: 2023-09-15
Payer: COMMERCIAL

## 2023-09-15 DIAGNOSIS — M54.2 NECK PAIN: Primary | ICD-10-CM

## 2023-09-15 PROCEDURE — 97140 MANUAL THERAPY 1/> REGIONS: CPT | Mod: GP | Performed by: PHYSICAL THERAPIST

## 2023-09-15 PROCEDURE — 97110 THERAPEUTIC EXERCISES: CPT | Mod: GP | Performed by: PHYSICAL THERAPIST

## 2023-09-18 ENCOUNTER — OFFICE VISIT (OUTPATIENT)
Dept: PHYSICAL MEDICINE AND REHAB | Facility: CLINIC | Age: 43
End: 2023-09-18
Attending: PEDIATRICS
Payer: COMMERCIAL

## 2023-09-18 VITALS — HEART RATE: 66 BPM | DIASTOLIC BLOOD PRESSURE: 66 MMHG | SYSTOLIC BLOOD PRESSURE: 116 MMHG

## 2023-09-18 DIAGNOSIS — M79.18 MYOFASCIAL PAIN: ICD-10-CM

## 2023-09-18 DIAGNOSIS — M54.12 CERVICAL RADICULAR PAIN: Primary | ICD-10-CM

## 2023-09-18 PROCEDURE — 99204 OFFICE O/P NEW MOD 45 MIN: CPT | Performed by: PHYSICAL MEDICINE & REHABILITATION

## 2023-09-18 RX ORDER — GABAPENTIN 100 MG/1
CAPSULE ORAL
Qty: 90 CAPSULE | Refills: 1 | Status: SHIPPED | OUTPATIENT
Start: 2023-09-18

## 2023-09-18 RX ORDER — METHYLPREDNISOLONE 4 MG
TABLET, DOSE PACK ORAL
Qty: 21 TABLET | Refills: 0 | Status: SHIPPED | OUTPATIENT
Start: 2023-09-18

## 2023-09-18 NOTE — PATIENT INSTRUCTIONS
An MRI was ordered for you today.  You will be contacted by scheduling within 3 days.    If you are not contacted, please call Radiology at [phone].  [place] [facility] [phone]. [place] [phone].   A physical therapy order was provided for you today.  You will be contacted by physical therapy.  If nobody contacts you within 3 to 5 days, please contact the clinic at [phone].  It will be very important for you to do your physical therapy exercises on a regular basis to decrease your pain and prevent future pain flares.   Medrol dose pack has been prescribed today.  Please follow directions on package.  Do not take with NSAIDs (ibupreofen or aleve).  I would recommend tylenol (extra strength or extended release/8hour) over the counter as needed.     Prescribed Gabapentin today, 100 mg tablets, to be titrated up to 3 tablets at bedtime as tolerated for your nerve pain. Please follow Gabapentin dosing chart below.     Gabapentin 100mg Dosing Chart    DATE  MORNING AFTERNOON BEDTIME    Day 1 0 0 1    Day 2 0 0 1    Day 3 0 0 1    Day 4 0 0 2    Day 5 0 0 2    Day 6 0 0 2    Day 7 0 0 3    Day 8 0 0 3    Day 9 0 0 3                                                                                                                                    Continue medication, taking 3 capsules at bedtime    Please call if you have any questions regarding how to take your medication  Clinic Phone # [phone]

## 2023-09-18 NOTE — PROGRESS NOTES
Assessment/Plan:      Roshni was seen today for neck pain.    Diagnoses and all orders for this visit:    Cervical radicular pain  -     MR Cervical Spine w/o Contrast; Future  -     Physical Therapy Referral; Future  -     gabapentin (NEURONTIN) 100 MG capsule; 100mg at bedtime x 3 days, then may increase to 200mg x 3 days, then may increase to 300mg at bedtime  -     methylPREDNISolone (MEDROL DOSEPAK) 4 MG tablet therapy pack; Follow Package Directions    Myofascial pain  -     Physical Therapy Referral; Future  -     methylPREDNISolone (MEDROL DOSEPAK) 4 MG tablet therapy pack; Follow Package Directions    Other orders  -     Spine  Referral         Assessment: Pleasant 43 year old female otherwise healthy with:    1.  3-month history of left cervical radicular pain with paresthesias to digits 2 and 3 likely C7.  There is also associated myofascial pain in the left parascapular region.  Neurologically intact but with positive neural tension signs/Spurling's to the left.  Has some improvement but persistent pain despite NSAIDs, activity modifications, physical therapy, chiropractor.        Discussion:    1.  I discussed the diagnosis and treatment options.  We discussed the options of imaging, medication changes, further therapy, chiropractor, steroid injections and surgical evaluation.  She wants to avoid surgery if possible.    2.  MRI cervical spine evaluate for left C7 nerve compression/C6/7 disc herniation.    2.  Trial Medrol Dosepak for pain.  She will hold on ibuprofen while on Medrol.    3.  Trial gabapentin at night to help with sleep and neuropathic pain.  100 mg at bedtime slowly increasing to 300 mg.    4.  Continue physical therapy for now.  New prescription provided.    5.  Can consider cervical epidural steroid injection if she has persistent pain.  We will have her follow-up in 1 month and make further recommendations after MRI completed.      It was our pleasure caring for your patient  today, if there any questions or concerns please do not hesitate to contact us.      Subjective:   Patient ID: Roshni Oliver is a 43 year old female.    History of Present Illness: Patient presents for evaluation of left-sided cervical spine pain parascapular pain and arm pain and paresthesias.  This started without any injury.  Started in June of this year was traveling to [place] nursing her 2-year-old which was kind of awkward on the plane with position and then began having pain sometime around then but no specific injury.  Her pain is constant was severe has improved some.  Left-sided mid cervical spine pain with parascapular paresthesias down the left arm to digits 2 and 3.  Constant.  Worse at night with sleep or prolonged sitting or reaching using her arm.  Better with raising her arm over her head/behind her head.  Has been taking ibuprofen was scheduled 3 times daily now down to once a day.  Has been to physical therapy for about 6 visits doing home exercises which may be helpful.  Chiropractor x3 may have slightly helped as well.  Her pain is a 9/10 at worst 6/10 today 5/10 at best.  Has had plain films.      Imaging: Plain film cervical spine imaging report personally reviewed From July 13.  This shows slight reversal of the cervical lordotic curve mild degenerative changes C5-6 and 6-7.         Review of Systems: Complains of headaches and muscle pain.  Denies fever, weight loss, waking, hoarseness, change in vision, chest pain, shortness of breath, abdominal pain, nausea, vomiting, bowel or bladder incontinence, skin issues, balance issues,  anxiety and depression. Remainder of 12 point review systems negative unless listed above.    Past Medical History:   Diagnosis Date    UTI (urinary tract infection) 05/11       Family History   Problem Relation Age of Onset    Depression Mother     Depression Father     Heart Disease Maternal Grandmother          Social History     Socioeconomic History     Marital status:      Spouse name: Juan    Number of children: 2    Years of education: None    Highest education level: None   Occupational History    Occupation: Teacher     Employer: [employer]     Employer: UNEMPLOYED   Tobacco Use    Smoking status: Never    Smokeless tobacco: Never   Substance and Sexual Activity    Alcohol use: No     Alcohol/week: 0.0 standard drinks of alcohol    Drug use: No    Sexual activity: Not Currently     Partners: Male     Birth control/protection: Abstinence   Other Topics Concern    Parent/sibling w/ CABG, MI or angioplasty before 65F 55M? No   Social History Narrative                                       The following portions of the patient's history were reviewed and updated as appropriate: allergies, current medications, past family history, past medical history, past social history, past surgical history and problem list.    Oswestry (ELLA) Questionnaire         No data to display                Neck Disability Index:      7/18/2023    12:00 PM   Neck Disability Index (  [employer]. and Johnny BOWERS. 1991. All rights reserved.; used with permission)   SECTION 1 - PAIN INTENSITY 4   SECTION 2 - PERSONAL CARE 2   SECTION 3 - LIFTING 1   SECTION 4 - READING 3   SECTION 5 - HEADACHES 0   SECTION 6 - CONCENTRATION 0   SECTION 7 - WORK 0   SECTION 8 - DRIVING 1   SECTION 9 - SLEEPING 2   SECTION 10 - RECREATION 2   Count 10   Sum 15   Raw Score: /50 15   Neck Disability Index Score: (%) 30 %          PHQ-2 Score:         7/13/2023     1:45 PM 12/12/2017     3:30 PM   PHQ-2 ( 1999 [employer])   Q1: Little interest or pleasure in doing things 0 0   Q2: Feeling down, depressed or hopeless 0 0   PHQ-2 Score 0 0   Q1: Little interest or pleasure in doing things Not at all    Q2: Feeling down, depressed or hopeless Not at all    PHQ-2 Score 0         Social history: .  3 children age 7 5 and 2.  Works as a high school guidance counselor.  No tobacco.  Does drink  alcohol.          Objective:   Physical Exam:    /66   Pulse 66   LMP 08/14/2023 (Within Days)   There is no height or weight on file to calculate BMI.    General:  Well-appearing female in no acute distress.  Pleasant, cooperative, and interactive throughout the examination and interview.  CV: No lower extremity edema on inspection or paltation.  Lymphatics: No cervical lymphadenopathy palpated.  Eyes: sclera clear.  Skin: No rashes or lesions seen over the head/neck, hairline, arms  Respirations unlabored.  MSK: Gait is nonantalgic. able to heel-toe walk without difficulty.  Negative Romberg.  Spine: normal AP curves of the C, T, and L spine.  Decreased range of motion the cervical spine with rotation to the left.  Sits with her head side bent to the right.  Palpation: Tenderness to palpation over left upper trapezius and parascapular region with hypertonic tissue textures.  Extremities: Full range of motion of the shoulders, elbows, and wrists with no effusions or tenderness to palpation.  Negative arm drop, empty can, and Speed's test bilaterally.  Negative Valle and Neer's test bilaterally.  Full range of motion of the  knees, and ankles from a seated position with no effusions or tenderness to palpation. No hypermobility of the upper or lower extremities.  Neurologic exam: Mental status: Patient is alert and oriented with normal affect.  Attention, knowledge, memory, and language are intact.  Normal coordination throughout the examination.  Reflexes are 2+ and symmetric biceps, triceps, brachioradialis, patellar, and Achilles with Negative Bari's.  Sensation is intact to light touch throughout the upper and lower extremities bilaterally.  Manual muscle testing reveals 5 out of 5 strength in the shoulder abductors, elbow flexors/extensors, wrist extensors, interosseous, and finger flexors; lower extremity strength appears grossly normal.   Normal muscle bulk and tone in the arms and legs.   Positive Spurling's to the left.

## 2023-09-18 NOTE — LETTER
9/18/2023         RE: Roshni Oliver  [address] [facility] [address]        Dear Colleague,    Thank you for referring your patient, Roshni Oliver, to the [facility] SPINE AND NEUROSURGERY. Please see a copy of my visit note below.    Assessment/Plan:      Roshni was seen today for neck pain.    Diagnoses and all orders for this visit:    Cervical radicular pain  -     MR Cervical Spine w/o Contrast; Future  -     Physical Therapy Referral; Future  -     gabapentin (NEURONTIN) 100 MG capsule; 100mg at bedtime x 3 days, then may increase to 200mg x 3 days, then may increase to 300mg at bedtime  -     methylPREDNISolone (MEDROL DOSEPAK) 4 MG tablet therapy pack; Follow Package Directions    Myofascial pain  -     Physical Therapy Referral; Future  -     methylPREDNISolone (MEDROL DOSEPAK) 4 MG tablet therapy pack; Follow Package Directions    Other orders  -     Spine  Referral         Assessment: Pleasant 43 year old female otherwise healthy with:    1.  3-month history of left cervical radicular pain with paresthesias to digits 2 and 3 likely C7.  There is also associated myofascial pain in the left parascapular region.  Neurologically intact but with positive neural tension signs/Spurling's to the left.  Has some improvement but persistent pain despite NSAIDs, activity modifications, physical therapy, chiropractor.        Discussion:    1.  I discussed the diagnosis and treatment options.  We discussed the options of imaging, medication changes, further therapy, chiropractor, steroid injections and surgical evaluation.  She wants to avoid surgery if possible.    2.  MRI cervical spine evaluate for left C7 nerve compression/C6/7 disc herniation.    2.  Trial Medrol Dosepak for pain.  She will hold on ibuprofen while on Medrol.    3.  Trial gabapentin at night to help with sleep and neuropathic pain.  100 mg at bedtime slowly increasing to 300 mg.    4.  Continue physical therapy  for now.  New prescription provided.    5.  Can consider cervical epidural steroid injection if she has persistent pain.  We will have her follow-up in 1 month and make further recommendations after MRI completed.      It was our pleasure caring for your patient today, if there any questions or concerns please do not hesitate to contact us.      Subjective:   Patient ID: Roshni Oliver is a 43 year old female.    History of Present Illness: Patient presents for evaluation of left-sided cervical spine pain parascapular pain and arm pain and paresthesias.  This started without any injury.  Started in June of this year was traveling to [place] nursing her 2-year-old which was kind of awkward on the plane with position and then began having pain sometime around then but no specific injury.  Her pain is constant was severe has improved some.  Left-sided mid cervical spine pain with parascapular paresthesias down the left arm to digits 2 and 3.  Constant.  Worse at night with sleep or prolonged sitting or reaching using her arm.  Better with raising her arm over her head/behind her head.  Has been taking ibuprofen was scheduled 3 times daily now down to once a day.  Has been to physical therapy for about 6 visits doing home exercises which may be helpful.  Chiropractor x3 may have slightly helped as well.  Her pain is a 9/10 at worst 6/10 today 5/10 at best.  Has had plain films.      Imaging: Plain film cervical spine imaging report personally reviewed From July 13.  This shows slight reversal of the cervical lordotic curve mild degenerative changes C5-6 and 6-7.         Review of Systems: Complains of headaches and muscle pain.  Denies fever, weight loss, waking, hoarseness, change in vision, chest pain, shortness of breath, abdominal pain, nausea, vomiting, bowel or bladder incontinence, skin issues, balance issues,  anxiety and depression. Remainder of 12 point review systems negative unless listed above.    Past  Medical History:   Diagnosis Date     UTI (urinary tract infection) 05/11       Family History   Problem Relation Age of Onset     Depression Mother      Depression Father      Heart Disease Maternal Grandmother          Social History     Socioeconomic History     Marital status:      Spouse name: Juan     Number of children: 2     Years of education: None     Highest education level: None   Occupational History     Occupation: Teacher     Employer: [employer]     Employer: UNEMPLOYED   Tobacco Use     Smoking status: Never     Smokeless tobacco: Never   Substance and Sexual Activity     Alcohol use: No     Alcohol/week: 0.0 standard drinks of alcohol     Drug use: No     Sexual activity: Not Currently     Partners: Male     Birth control/protection: Abstinence   Other Topics Concern     Parent/sibling w/ CABG, MI or angioplasty before 65F 55M? No   Social History Narrative                                       The following portions of the patient's history were reviewed and updated as appropriate: allergies, current medications, past family history, past medical history, past social history, past surgical history and problem list.    Oswestry (ELLA) Questionnaire         No data to display                Neck Disability Index:      7/18/2023    12:00 PM   Neck Disability Index (  [employer]. 1991. All rights reserved.; used with permission)   SECTION 1 - PAIN INTENSITY 4   SECTION 2 - PERSONAL CARE 2   SECTION 3 - LIFTING 1   SECTION 4 - READING 3   SECTION 5 - HEADACHES 0   SECTION 6 - CONCENTRATION 0   SECTION 7 - WORK 0   SECTION 8 - DRIVING 1   SECTION 9 - SLEEPING 2   SECTION 10 - RECREATION 2   Count 10   Sum 15   Raw Score: /50 15   Neck Disability Index Score: (%) 30 %          PHQ-2 Score:         7/13/2023     1:45 PM 12/12/2017     3:30 PM   PHQ-2 ( 1999 [employer])   Q1: Little interest or pleasure in doing things 0 0   Q2: Feeling down, depressed or hopeless 0 0   PHQ-2 Score 0 0    Q1: Little interest or pleasure in doing things Not at all    Q2: Feeling down, depressed or hopeless Not at all    PHQ-2 Score 0         Social history: .  3 children age 7 5 and 2.  Works as a high school guidance counselor.  No tobacco.  Does drink alcohol.          Objective:   Physical Exam:    /66   Pulse 66   LMP 08/14/2023 (Within Days)   There is no height or weight on file to calculate BMI.    General:  Well-appearing female in no acute distress.  Pleasant, cooperative, and interactive throughout the examination and interview.  CV: No lower extremity edema on inspection or paltation.  Lymphatics: No cervical lymphadenopathy palpated.  Eyes: sclera clear.  Skin: No rashes or lesions seen over the head/neck, hairline, arms  Respirations unlabored.  MSK: Gait is nonantalgic. able to heel-toe walk without difficulty.  Negative Romberg.  Spine: normal AP curves of the C, T, and L spine.  Decreased range of motion the cervical spine with rotation to the left.  Sits with her head side bent to the right.  Palpation: Tenderness to palpation over left upper trapezius and parascapular region with hypertonic tissue textures.  Extremities: Full range of motion of the shoulders, elbows, and wrists with no effusions or tenderness to palpation.  Negative arm drop, empty can, and Speed's test bilaterally.  Negative Valle and Neer's test bilaterally.  Full range of motion of the  knees, and ankles from a seated position with no effusions or tenderness to palpation. No hypermobility of the upper or lower extremities.  Neurologic exam: Mental status: Patient is alert and oriented with normal affect.  Attention, knowledge, memory, and language are intact.  Normal coordination throughout the examination.  Reflexes are 2+ and symmetric biceps, triceps, brachioradialis, patellar, and Achilles with Negative Bari's.  Sensation is intact to light touch throughout the upper and lower extremities bilaterally.   Manual muscle testing reveals 5 out of 5 strength in the shoulder abductors, elbow flexors/extensors, wrist extensors, interosseous, and finger flexors; lower extremity strength appears grossly normal.   Normal muscle bulk and tone in the arms and legs.  Positive Spurling's to the left.        Again, thank you for allowing me to participate in the care of your patient.        Sincerely,        Bobby Hough, DO

## 2023-10-11 ENCOUNTER — HOSPITAL ENCOUNTER (OUTPATIENT)
Dept: MRI IMAGING | Facility: HOSPITAL | Age: 43
Discharge: HOME | End: 2023-10-11
Attending: PHYSICAL MEDICINE & REHABILITATION | Admitting: PHYSICAL MEDICINE & REHABILITATION
Payer: COMMERCIAL

## 2023-10-11 DIAGNOSIS — M54.12 CERVICAL RADICULAR PAIN: ICD-10-CM

## 2023-10-11 PROCEDURE — 72141 MRI NECK SPINE W/O DYE: CPT

## 2023-10-13 ENCOUNTER — THERAPY VISIT (OUTPATIENT)
Dept: PHYSICAL THERAPY | Facility: CLINIC | Age: 43
End: 2023-10-13
Payer: COMMERCIAL

## 2023-10-13 DIAGNOSIS — M54.2 NECK PAIN: Primary | ICD-10-CM

## 2023-10-13 PROCEDURE — 97012 MECHANICAL TRACTION THERAPY: CPT | Mod: GP | Performed by: PHYSICAL THERAPIST

## 2023-10-13 PROCEDURE — 97110 THERAPEUTIC EXERCISES: CPT | Mod: GP | Performed by: PHYSICAL THERAPIST

## 2023-10-16 ENCOUNTER — TELEPHONE (OUTPATIENT)
Dept: PHYSICAL MEDICINE AND REHAB | Facility: CLINIC | Age: 43
End: 2023-10-16
Payer: COMMERCIAL

## 2023-10-16 DIAGNOSIS — M54.12 CERVICAL RADICULAR PAIN: Primary | ICD-10-CM

## 2023-10-16 NOTE — TELEPHONE ENCOUNTER
Patient calling in as she would like to schedule the recommended injection per Dr. Hough' MyChart message to her.     Chart reviewed. Order placed in Epic for the C7-T1 ILESI. Injection requirements reviewed in full with patient. Discussed steroids have immunosuppressant properties which could potentially put patient at a higher risk for a worsened course of any infection including COVID. Stated understanding. Transferred to scheduling to make appointment.

## 2023-11-01 ENCOUNTER — NURSE TRIAGE (OUTPATIENT)
Dept: NURSING | Facility: CLINIC | Age: 43
End: 2023-11-01
Payer: COMMERCIAL

## 2023-11-01 NOTE — TELEPHONE ENCOUNTER
Provider Response to 2nd Level Triage Request    I have reviewed the RN documentation. My recommendation is:  Yes, injection is ok

## 2023-11-01 NOTE — TELEPHONE ENCOUNTER
Patient calling to ask if it is ok for her to get her cortisone injection tomorrow if she has mild cold symptoms. Patient tested negative for Covid and does not have a fever.  Informed patient that as long as she's feeling well and does not have a fever she should be able to get her cortisone injection. Routing to provider to confirm.     Hawa Carolina RN  11/01/23 4:06 PM  [facility] Nurse Advisor      Reason for Disposition   Nursing judgment    Protocols used: Information Only Call - No Triage-A-OH

## 2023-11-02 ENCOUNTER — RADIOLOGY INJECTION OFFICE VISIT (OUTPATIENT)
Dept: PHYSICAL MEDICINE AND REHAB | Facility: CLINIC | Age: 43
End: 2023-11-02
Attending: PHYSICAL MEDICINE & REHABILITATION
Payer: COMMERCIAL

## 2023-11-02 VITALS
RESPIRATION RATE: 16 BRPM | HEART RATE: 71 BPM | SYSTOLIC BLOOD PRESSURE: 108 MMHG | DIASTOLIC BLOOD PRESSURE: 60 MMHG | OXYGEN SATURATION: 98 % | TEMPERATURE: 97.5 F

## 2023-11-02 DIAGNOSIS — M54.12 CERVICAL RADICULAR PAIN: ICD-10-CM

## 2023-11-02 PROCEDURE — 62321 NJX INTERLAMINAR CRV/THRC: CPT | Performed by: PAIN MEDICINE

## 2023-11-02 RX ORDER — DEXAMETHASONE SODIUM PHOSPHATE 10 MG/ML
INJECTION, SOLUTION INTRAMUSCULAR; INTRAVENOUS
Status: COMPLETED | OUTPATIENT
Start: 2023-11-02 | End: 2023-11-02

## 2023-11-02 RX ORDER — LIDOCAINE HYDROCHLORIDE 10 MG/ML
INJECTION, SOLUTION EPIDURAL; INFILTRATION; INTRACAUDAL; PERINEURAL
Status: COMPLETED | OUTPATIENT
Start: 2023-11-02 | End: 2023-11-02

## 2023-11-02 RX ADMIN — DEXAMETHASONE SODIUM PHOSPHATE 10 MG: 10 INJECTION, SOLUTION INTRAMUSCULAR; INTRAVENOUS at 15:49

## 2023-11-02 RX ADMIN — LIDOCAINE HYDROCHLORIDE 2 ML: 10 INJECTION, SOLUTION EPIDURAL; INFILTRATION; INTRACAUDAL; PERINEURAL at 15:49

## 2023-11-02 ASSESSMENT — PAIN SCALES - GENERAL
PAINLEVEL: MODERATE PAIN (4)
PAINLEVEL: SEVERE PAIN (6)

## 2023-11-02 NOTE — PATIENT INSTRUCTIONS
DISCHARGE INSTRUCTIONS    During office hours (8:00 a.m.- 4:00 p.m.) questions or concerns may be answered  by calling Spine Center Navigation Nurses at  [phone].  Messages received after hours will be returned the following business day.      In the case of an emergency, please dial 911 or seek assistance at the nearest Emergency Room/Urgent Care facility.     All Patients:    You may experience an increase in your symptoms for the first 2 days (It may take anywhere between 2 days- 2 weeks for the steroid to have maximum effect).    You may use ice on the injection site, as frequently as 20 minutes each hour if needed.    You may take your pain medicine.    You may continue taking your regular medication after your injection. If you have had a Medial Branch Block you may resume pain medication once your pain diary is completed.    You may shower. No swimming, tub bath or hot tub for 48 hours.  You may remove your bandaid/bandage as soon as you are home.    You may resume light activities, as tolerated.    Resume your usual diet as tolerated.    It is strongly advised that you do not drive for 1-3 hours post injection.    If you have had oral sedation:  Do not drive for 8 hours post injection.      If you have had IV sedation:  Do not drive for 24 hours post injection.  Do not operate hazardous machinery or make important personal/business decisions for 24 hours.      POSSIBLE STEROID SIDE EFFECTS (If steroid/cortisone was used for your procedure)    -If you experience these symptoms, it should only last for a short period    Swelling of the legs              Skin redness (flushing)     Mouth (oral) irritation   Blood sugar (glucose) levels            Sweats                    Mood changes  Headache  Sleeplessness  Weakened immune system for up to 14 days, which could increase the risk of nitza the COVID-19 virus and/or experiencing more severe symptoms of the disease, if exposed.  Decreased  effectiveness of the flu vaccine if given within 2 weeks of the steroid.         POSSIBLE PROCEDURE SIDE EFFECTS  -Call the Spine Center if you are concerned  Increased Pain           Increased numbness/tingling      Nausea/Vomiting          Bruising/bleeding at site      Redness or swelling                                              Difficulty walking      Weakness           Fever greater than 100.5    *In the event of a severe headache after an epidural steroid injection that is relieved by lying down, please call the [facility] to speak with a clinical staff member*

## 2023-11-02 NOTE — TELEPHONE ENCOUNTER
RN called and spoke with patient. Relayed message below that injection is ok to receive. Patient verbalized understanding and has no questions at this time.       Lillie BOWERS RN on 11/2/2023 at 8:23 AM

## 2023-12-07 ENCOUNTER — OFFICE VISIT (OUTPATIENT)
Dept: PHYSICAL MEDICINE AND REHAB | Facility: CLINIC | Age: 43
End: 2023-12-07
Payer: COMMERCIAL

## 2023-12-07 VITALS — HEART RATE: 75 BPM | DIASTOLIC BLOOD PRESSURE: 63 MMHG | SYSTOLIC BLOOD PRESSURE: 119 MMHG

## 2023-12-07 DIAGNOSIS — M54.12 CERVICAL RADICULAR PAIN: Primary | ICD-10-CM

## 2023-12-07 DIAGNOSIS — M50.20 CERVICAL DISC HERNIATION: ICD-10-CM

## 2023-12-07 DIAGNOSIS — M40.202 KYPHOSIS OF CERVICAL REGION, UNSPECIFIED KYPHOSIS TYPE: ICD-10-CM

## 2023-12-07 DIAGNOSIS — M79.18 MYOFASCIAL PAIN: ICD-10-CM

## 2023-12-07 PROCEDURE — 99214 OFFICE O/P EST MOD 30 MIN: CPT | Performed by: PHYSICAL MEDICINE & REHABILITATION

## 2023-12-07 RX ORDER — MELOXICAM 15 MG/1
7.5-15 TABLET ORAL DAILY PRN
Qty: 30 TABLET | Refills: 1 | Status: SHIPPED | OUTPATIENT
Start: 2023-12-07

## 2023-12-07 ASSESSMENT — PAIN SCALES - GENERAL: PAINLEVEL: SEVERE PAIN (6)

## 2023-12-07 NOTE — LETTER
12/7/2023         RE: Roshni Oliver  [address] [facility] [address]        Dear Colleague,    Thank you for referring your patient, Roshni Oliver, to the [facility] SPINE AND NEUROSURGERY. Please see a copy of my visit note below.    Assessment/Plan:      June was seen today for neck pain.    Diagnoses and all orders for this visit:    Cervical radicular pain  -     CT Cervical Spine w/o Contrast; Future  -     EMG; Future  -     meloxicam (MOBIC) 15 MG tablet; Take 0.5-1 tablets (7.5-15 mg) by mouth daily as needed for pain    Myofascial pain  -     meloxicam (MOBIC) 15 MG tablet; Take 0.5-1 tablets (7.5-15 mg) by mouth daily as needed for pain    Kyphosis of cervical region, unspecified kyphosis type  -     CT Cervical Spine w/o Contrast; Future    Cervical disc herniation  -     CT Cervical Spine w/o Contrast; Future         Assessment: Pleasant 43 year old female otherwise healthy with:     1.  Approximate 6-month history of left cervical radicular pain with paresthesias to digits 2 and 3 likely C7. There is also associated myofascial pain in the left parascapular region.  Consistent with cervical radicular pain with a left paracentral disc herniation at C6-7 with lateral recess stenosis.  Neurologically intact but with positive neural tension signs/Spurling's to the left.  Has some improvement but persistent pain despite NSAIDs, activity modifications, physical therapy, chiropractor.  No improvement with Medrol Dosepak and additional physical therapy.  Equivocal response to gabapentin stopped taking this.  No benefit following interlaminar epidural steroid injection On November 2.         Discussion:    1.  We discussed the diagnosis and treatment options.  We discussed the large disc herniation C6/7 with persistent radicular symptoms.  We discussed options of continued monitor symptoms, medication changes, further diagnostics.  Discussed surgical evaluation which she wants to hold off  on surgical evaluation if at all possible.  I am concerned that she may have the start of calcification of the disc herniation given the length of time she has had this issue.  This needs to be evaluated and we can gather further data regarding cervical radiculopathy if/axonal loss as well.     2.  Recommend EMG left upper extremity evaluate for radiculopathy.    3.  CT scan of the cervical spine evaluate for calcification of the disc.    4.  If she has calcification of the disc or positive EMG will likely recommend surgical evaluation.    5.  Trial meloxicam 7.5-15 mg a day as needed.  Taking this at night may help her pain overnight.    6.  Follow-up at earliest convenience for EMG.         It was our pleasure caring for your patient today, if there any questions or concerns please do not hesitate to contact us.      Subjective:   Patient ID: Roshni Oliver is a 43 year old female.    History of Present Illness: Patient presents for follow-up of cervical spine pain with left cervical radicular pain and paresthesias.  Pain is not significant improved since last visit.  Has constant neck pain with left arm paresthesias constant mostly digit 1 and 2 but constant digit to.  No weakness in the arm.  Pain is an 8/10 at worst 6/10 today at best.  Worse at night sleeping.  Difficult to find a good position.  Typically sleeps on her side.  With sleeping she does get tightness through the left parascapular region left arm but no specific weakness.  Better with standing or changing positions.  Has continued with physical therapy doing home exercises.  Had cervical epidural no change following epidural.  Medications had not been that helpful.  Tried gabapentin at night not sure if it was helpful.  Medrol Dosepak not helpful.      Imaging: MRI report and images were personally reviewed and discussed with the patient.  A plastic model was utilized during the discussion.  MRI of the cervical spine personally reviewed.   Degenerative changes mild disc at loss C4-5 C5-6 and 6-7 so C7-T1.  Moderate central stenosis at C6/7 related to a left lateral disc herniation.  There is moderate facet arthropathy as well with severe left lateral recess stenosis.  C5-6 moderate facet arthropathy mild central stenosis with broad-based disc bulge.  Kyphosis C5-6.    Review of Systems:   Pertinent positives:   Positive paresthesias headaches.  Pertinent negatives: No   weakness.  No bowel or bladder incontinence.  No urinary retention.  No fevers, unintentional weight loss, balance changes,  frequent falling, difficulty swallowing, or coordination difficulties.  All others reviewed are negative.    Past Medical History:   Diagnosis Date     UTI (urinary tract infection) 05/11       The following portions of the patient's history were reviewed and updated as appropriate: allergies, current medications, past family history, past medical history, past social history, past surgical history and problem list.           Objective:   Physical Exam:    /63   Pulse 75   There is no height or weight on file to calculate BMI.      General: Alert and oriented with normal affect. Attention, knowledge, memory, and language are intact. No acute distress.   Eyes: Sclerae are clear.  Respirations: Unlabored.       Gait:  Nonantalgic  Positive Spurling's to the left  Sensation is intact to light touch throughout the upper   extremities.  Reflexes are 2+ and symmetric in the biceps triceps and brachioradialis with negative Hoffmans.      Manual muscle testing reveals:  Right /Left out of 5  5/5 shoulder abductors  5/5 elbow flexors  5/5 elbow extensors  5/5 wrist extensors  5/5 interosseus  5/5 finger flexors         Again, thank you for allowing me to participate in the care of your patient.        Sincerely,        Bobby Hough DO

## 2023-12-07 NOTE — PROGRESS NOTES
Assessment/Plan:      June was seen today for neck pain.    Diagnoses and all orders for this visit:    Cervical radicular pain  -     CT Cervical Spine w/o Contrast; Future  -     EMG; Future  -     meloxicam (MOBIC) 15 MG tablet; Take 0.5-1 tablets (7.5-15 mg) by mouth daily as needed for pain    Myofascial pain  -     meloxicam (MOBIC) 15 MG tablet; Take 0.5-1 tablets (7.5-15 mg) by mouth daily as needed for pain    Kyphosis of cervical region, unspecified kyphosis type  -     CT Cervical Spine w/o Contrast; Future    Cervical disc herniation  -     CT Cervical Spine w/o Contrast; Future         Assessment: Pleasant 43 year old female otherwise healthy with:     1.  Approximate 6-month history of left cervical radicular pain with paresthesias to digits 2 and 3 likely C7. There is also associated myofascial pain in the left parascapular region.  Consistent with cervical radicular pain with a left paracentral disc herniation at C6-7 with lateral recess stenosis.  Neurologically intact but with positive neural tension signs/Spurling's to the left.  Has some improvement but persistent pain despite NSAIDs, activity modifications, physical therapy, chiropractor.  No improvement with Medrol Dosepak and additional physical therapy.  Equivocal response to gabapentin stopped taking this.  No benefit following interlaminar epidural steroid injection On November 2.         Discussion:    1.  We discussed the diagnosis and treatment options.  We discussed the large disc herniation C6/7 with persistent radicular symptoms.  We discussed options of continued monitor symptoms, medication changes, further diagnostics.  Discussed surgical evaluation which she wants to hold off on surgical evaluation if at all possible.  I am concerned that she may have the start of calcification of the disc herniation given the length of time she has had this issue.  This needs to be evaluated and we can gather further data regarding cervical  radiculopathy if/axonal loss as well.     2.  Recommend EMG left upper extremity evaluate for radiculopathy.    3.  CT scan of the cervical spine evaluate for calcification of the disc.    4.  If she has calcification of the disc or positive EMG will likely recommend surgical evaluation.    5.  Trial meloxicam 7.5-15 mg a day as needed.  Taking this at night may help her pain overnight.    6.  Follow-up at earliest convenience for EMG.         It was our pleasure caring for your patient today, if there any questions or concerns please do not hesitate to contact us.      Subjective:   Patient ID: Roshni Oliver is a 43 year old female.    History of Present Illness: Patient presents for follow-up of cervical spine pain with left cervical radicular pain and paresthesias.  Pain is not significant improved since last visit.  Has constant neck pain with left arm paresthesias constant mostly digit 1 and 2 but constant digit to.  No weakness in the arm.  Pain is an 8/10 at worst 6/10 today at best.  Worse at night sleeping.  Difficult to find a good position.  Typically sleeps on her side.  With sleeping she does get tightness through the left parascapular region left arm but no specific weakness.  Better with standing or changing positions.  Has continued with physical therapy doing home exercises.  Had cervical epidural no change following epidural.  Medications had not been that helpful.  Tried gabapentin at night not sure if it was helpful.  Medrol Dosepak not helpful.      Imaging: MRI report and images were personally reviewed and discussed with the patient.  A plastic model was utilized during the discussion.  MRI of the cervical spine personally reviewed.  Degenerative changes mild disc at loss C4-5 C5-6 and 6-7 so C7-T1.  Moderate central stenosis at C6/7 related to a left lateral disc herniation.  There is moderate facet arthropathy as well with severe left lateral recess stenosis.  C5-6 moderate facet  arthropathy mild central stenosis with broad-based disc bulge.  Kyphosis C5-6.    Review of Systems:   Pertinent positives:   Positive paresthesias headaches.  Pertinent negatives: No   weakness.  No bowel or bladder incontinence.  No urinary retention.  No fevers, unintentional weight loss, balance changes,  frequent falling, difficulty swallowing, or coordination difficulties.  All others reviewed are negative.    Past Medical History:   Diagnosis Date    UTI (urinary tract infection) 05/11       The following portions of the patient's history were reviewed and updated as appropriate: allergies, current medications, past family history, past medical history, past social history, past surgical history and problem list.           Objective:   Physical Exam:    /63   Pulse 75   There is no height or weight on file to calculate BMI.      General: Alert and oriented with normal affect. Attention, knowledge, memory, and language are intact. No acute distress.   Eyes: Sclerae are clear.  Respirations: Unlabored.       Gait:  Nonantalgic  Positive Spurling's to the left  Sensation is intact to light touch throughout the upper   extremities.  Reflexes are 2+ and symmetric in the biceps triceps and brachioradialis with negative Hoffmans.      Manual muscle testing reveals:  Right /Left out of 5  5/5 shoulder abductors  5/5 elbow flexors  5/5 elbow extensors  5/5 wrist extensors  5/5 interosseus  5/5 finger flexors

## 2023-12-07 NOTE — PATIENT INSTRUCTIONS
Schedule an EMG (nerve test) with Dr Hough.   An CT was ordered for you today.  You will be contacted by scheduling within 3 days.    If you are not contacted, please call Radiology at [phone].    Meloxicam (which is an anti-inflammatory) medication is prescribed today. Take 1/2-1 tablet  a day as needed for pain.This medication should be taken with food and water to prevent any stomach upset. Do not take ibuprofen/Advil/Motrin/Aleve  while you take Meloxicam. Please call if you have any side effects.

## 2023-12-26 ENCOUNTER — HOSPITAL ENCOUNTER (OUTPATIENT)
Dept: CT IMAGING | Facility: HOSPITAL | Age: 43
Discharge: HOME | End: 2023-12-26
Attending: PHYSICAL MEDICINE & REHABILITATION | Admitting: PHYSICAL MEDICINE & REHABILITATION
Payer: COMMERCIAL

## 2023-12-26 DIAGNOSIS — M40.202 KYPHOSIS OF CERVICAL REGION, UNSPECIFIED KYPHOSIS TYPE: ICD-10-CM

## 2023-12-26 DIAGNOSIS — M54.12 CERVICAL RADICULAR PAIN: ICD-10-CM

## 2023-12-26 DIAGNOSIS — M50.20 CERVICAL DISC HERNIATION: ICD-10-CM

## 2023-12-26 PROCEDURE — 72125 CT NECK SPINE W/O DYE: CPT

## 2023-12-27 ENCOUNTER — TELEPHONE (OUTPATIENT)
Dept: NEUROSURGERY | Facility: CLINIC | Age: 43
End: 2023-12-27

## 2023-12-27 ENCOUNTER — OFFICE VISIT (OUTPATIENT)
Dept: PHYSICAL MEDICINE AND REHAB | Facility: CLINIC | Age: 43
End: 2023-12-27
Attending: PHYSICAL MEDICINE & REHABILITATION
Payer: COMMERCIAL

## 2023-12-27 VITALS — DIASTOLIC BLOOD PRESSURE: 62 MMHG | SYSTOLIC BLOOD PRESSURE: 118 MMHG | HEART RATE: 79 BPM

## 2023-12-27 DIAGNOSIS — M54.12 CERVICAL RADICULAR PAIN: Primary | ICD-10-CM

## 2023-12-27 DIAGNOSIS — M50.20 CERVICAL DISC HERNIATION: ICD-10-CM

## 2023-12-27 DIAGNOSIS — M40.202 KYPHOSIS OF CERVICAL REGION, UNSPECIFIED KYPHOSIS TYPE: ICD-10-CM

## 2023-12-27 DIAGNOSIS — M79.18 MYOFASCIAL PAIN: ICD-10-CM

## 2023-12-27 PROCEDURE — 95886 MUSC TEST DONE W/N TEST COMP: CPT | Mod: LT | Performed by: PHYSICAL MEDICINE & REHABILITATION

## 2023-12-27 PROCEDURE — 95909 NRV CNDJ TST 5-6 STUDIES: CPT | Performed by: PHYSICAL MEDICINE & REHABILITATION

## 2023-12-27 PROCEDURE — 99213 OFFICE O/P EST LOW 20 MIN: CPT | Mod: 25 | Performed by: PHYSICAL MEDICINE & REHABILITATION

## 2023-12-27 ASSESSMENT — PAIN SCALES - GENERAL: PAINLEVEL: MILD PAIN (3)

## 2023-12-27 NOTE — LETTER
12/27/2023         RE: Roshni Oliver  [address] [facility] [address]        Dear Colleague,    Thank you for referring your patient, Roshni Oliver, to the [facility]. Please see a copy of my visit note below.    Assessment/Plan:      June was seen today for emg.    Diagnoses and all orders for this visit:    Cervical radicular pain  -     EMG  -     Neurosurgery Referral; Future  -     MA NEEDLE EMG EA EXTREMTY W/PARASPINAL AREA COMPLETE  -     MA NCS MOTOR W OR W/O F-WAVE, 7 OR 8    Cervical disc herniation  -     Neurosurgery Referral; Future    Kyphosis of cervical region, unspecified kyphosis type    Myofascial pain         Assessment: Pleasant 43 year old female otherwise healthy with:     1.  Approximate 6-7month history of left cervical radicular pain with paresthesias to digits 2 and 3 likely C7. There is also associated myofascial pain in the left parascapular region.  Consistent with cervical radicular pain with a left paracentral disc herniation at C6-7 with lateral recess stenosis.  Neurologically intact but with positive neural tension signs/Spurling's to the left.  Has some improvement but persistent pain despite NSAIDs, activity modifications, physical therapy, chiropractor.  No improvement with Medrol Dosepak and additional physical therapy.  Equivocal response to gabapentin stopped taking this.  No benefit following interlaminar epidural steroid injection On November 2, 2023.  CT scan shows the left C6/7 disc herniation with kyphosis at C5-6 and 6-7.  There may be slight to start of calcification on my review of the imaging but no overt calcification of the disc herniation.  Findings consistent with a left C7 radiculopathy on EMG.       Discussion:    1.  I reviewed the CT scan as well as the EMG today.  We looked at the CT images and she has failed conservative management thus far including cervical epidural, PT and Medrol Dosepak.  Has not tried  gabapentin or meloxicam.  She continues to have constant paresthesias.  We discussed options of monitoring symptoms along with surgical evaluation.  Given potential for disc calcification and persistence of symptoms I would recommend being more aggressive in getting an opinion from neurosurgery and she agrees.    2.  Neurosurgical evaluation recommended order placed.    3.  May trial gabapentin or meloxicam as previously ordered.    4.  Follow-up with me as needed after seeing neurosurgery.      It was our pleasure caring for your patient today, if there any questions or concerns please do not hesitate to contact us.      Subjective:   Patient ID: Roshni Oliver is a 43 year old female.    History of Present Illness: Patient presents for follow-up of cervical spine pain left cervical radicular pain.  Pain is improved some but has constant paresthesias digits 1-3 of the left hand and she is left-handed.  Her symptoms are 3/10 today.  Worse first thing in the morning when she wakes up nothing makes her pain better.  Has not tried meloxicam or gabapentin as of yet.  Had an EMG done today.    EMG: Denervation findings of the left tricep consistent with C7 radiculopathy.      Imaging: CT   report and images were personally reviewed and discussed with the patient.  A plastic model was utilized during the discussion.   CT of the cervical spine reviewed this shows kyphosis at C5-6 with moderate disc height loss at C6/7 and mild at C5-6.  Moderate left facet arthropathy C7-T1 prominent left lateral foraminal disc extrusion at C6-7 with severe left lateral recess stenosis no calcification of the disc.    Review of Systems: Pertinent positives: Constant paresthesias left arm.  Pertinent negatives: No weakness some difficulty swallowing.  No bowel or bladder incontinence.  No urinary retention.  No fevers, unintentional weight loss, balance changes, headaches, frequent falling, or coordination difficulties.  All others  reviewed are negative.         Past Medical History:   Diagnosis Date     UTI (urinary tract infection) 05/11       The following portions of the patient's history were reviewed and updated as appropriate: allergies, current medications, past family history, past medical history, past social history, past surgical history and problem list.           Objective:   Physical Exam:    /62   Pulse 79   There is no height or weight on file to calculate BMI.      General: Alert and oriented with normal affect. Attention, knowledge, memory, and language are intact. No acute distress.   Eyes: Sclerae are clear.  Respirations: Unlabored.  Skin: No rashes seen of the arms.    Gait:  Nonantalgic    Sensation is intact to light touch throughout the upper extremities.  Reflexes are 2+ and symmetric in the biceps triceps and brachioradialis     Manual muscle testing reveals:  Right /Left out of 5  5/5 shoulder abductors  5/5 elbow flexors  5/5 elbow extensors  5/5 wrist extensors  5/5 interosseus  5/5 finger flexors       [address] [address]  Office: [phone] Fax: [phone]    Electromyography and Nerve Conduction Study Report        Indication: Patient presents at the request of Dr. Bobby Hough for left upper extremity EMG.  She has cervical spine pain with left arm paresthesias digits 1-3 no weakness.  Cervical disc herniation C6/7.  On exam she has normal sensation to light touch to the upper extremities, normal reflexes through the upper extremities, and normal muscle strength throughout the major muscle groups of the bilateral upper extremities.      Pt Exam Discussion (Communication Barriers):  Electromyography and nerve conduction testing, including associated discomfort, risks, benefits, and alternatives was discussed with the patient prior to the procedure.  No learning/ communication barriers; patient verbalized understanding of  procedure.  Informed consent was obtained.           Pt Assessment:  Testing was successfully completed; patient tolerated testing well.       Blood Thinners: None Skin Temperature: Warmed 32.1                     EMG/NCS  results:     Nerve Conduction Studies  Motor Sites      Segment Distal Latency Neg. Amp CV F-Latency F-Estimate Comment   Site  (ms) (mV) (m/s) (ms) (ms)    Left Median (APB) Motor   Wrist Wrist-APB 3.4 11.7       Elbow Elbow-Wrist 7.5 11.5 56      Left Ulnar (ADM) Motor   Wrist  2.1 8.8       Bel Elbow Bel Elbow-Wrist 5.6 7.2 62      Ab Elbow Ab Elbow-Wrist 7.3 6.9 62        Sensory Sites      Onset Lat Peak Lat Amp CV Comment   Site (ms) (ms) ( V) (m/s)    Left Median Anti Sensory   Wrist-Dig II 2.2 2.8 69 59    Left Median-Ulnar Palmar Sensory        Median   Palm-Wrist 1.25 1.75 75 64         Ulnar   Palm-Wrist 1.08 1.63 37 74    Left Radial Sensory   Forearm-Wrist 1.50 2.0 42 67    Left Ulnar Anti Sensory   Wrist-Dig V 1.65 2.3 30 67        NCS Waveforms:    Motor         Sensory                  Electromyography     Side Muscle Nerve Root Ins Act Fibs Psw Fasc Recrt Dur Amp Poly Comment   Left Deltoid Axillary C5-6 Nml Nml Nml Nml Nml Nml Nml 0    Left Triceps Radial C6-7-8 *Incr *1+ *1+ Nml Nml Nml Nml 0    Left PronatorTeres Median C6-7 Nml Nml Nml Nml Nml Nml Nml 0    Left 1stDorInt Ulnar C8-T1 Nml Nml Nml Nml Nml Nml Nml 0    Left Abd Poll Brev Median C8-T1 Nml Nml Nml Nml Nml Nml Nml 0    Left Biceps2 Musculocut C5-6 Nml Nml Nml Nml Nml Nml Nml 0    Left C6 Parasp Rami C6 Nml Nml Nml Nml  Nml Nml     Left C7 Parasp Rami C7 Nml Nml Nml Nml  Nml Nml           Comment NCS: Normal study  1.  Normal nerve conduction studies left upper extremity.    Comment EMG: Abnormal study  1.  Mild positive waves and fibrillation of the left tricep.  Remainder left upper extremity needle EMG normal.    Interpretation: Abnormal study: There is electrodiagnostic evidence of:     1. Increased insertional  activity with denervation potentials of the left tricep.  These findings are nonspecific and nondiagnostic.  Under the correct clinical condition, they can be observed in an active left C7 radiculopathy     2.  There is no electrodiagnostic evidence of  brachial plexopathy or focal neuropathy in the left upper extremity.    The testing was completed in its entirety by the physician.       It was our pleasure caring for your patient today, if there any questions or concerns please do not hesitate to contact us.      Again, thank you for allowing me to participate in the care of your patient.        Sincerely,        Bobby Hough, DO

## 2023-12-27 NOTE — TELEPHONE ENCOUNTER
Date: 12/27/23  (Provide the date when patient was called)  Provider: Dr. Henry  (with whom patient needs to schedule with)  Detailed message:  Left message to call back to schedule consultation.    Referring Provider:  Bobby Hough  Diagnosis: Cervical radicular pain [M54.12] Cervical disc herniation [M50.20]  MRI/CT: 10/11/23  XR: 7/13/23  INJ: 11/2/23  PT: yes  Previous Surgery:

## 2023-12-27 NOTE — PROGRESS NOTES
[facility]  [address] [place] [address] [address]  [place] [address]  Office: [phone] Fax: [phone]    Electromyography and Nerve Conduction Study Report        Indication: Patient presents at the request of Dr. Bobby Hough for left upper extremity EMG.  She has cervical spine pain with left arm paresthesias digits 1-3 no weakness.  Cervical disc herniation C6/7.  On exam she has normal sensation to light touch to the upper extremities, normal reflexes through the upper extremities, and normal muscle strength throughout the major muscle groups of the bilateral upper extremities.      Pt Exam Discussion (Communication Barriers):  Electromyography and nerve conduction testing, including associated discomfort, risks, benefits, and alternatives was discussed with the patient prior to the procedure.  No learning/ communication barriers; patient verbalized understanding of procedure.  Informed consent was obtained.           Pt Assessment:  Testing was successfully completed; patient tolerated testing well.       Blood Thinners: None Skin Temperature: Warmed 32.1                     EMG/NCS  results:     Nerve Conduction Studies  Motor Sites      Segment Distal Latency Neg. Amp CV F-Latency F-Estimate Comment   Site  (ms) (mV) (m/s) (ms) (ms)    Left Median (APB) Motor   Wrist Wrist-APB 3.4 11.7       Elbow Elbow-Wrist 7.5 11.5 56      Left Ulnar (ADM) Motor   Wrist  2.1 8.8       Bel Elbow Bel Elbow-Wrist 5.6 7.2 62      Ab Elbow Ab Elbow-Wrist 7.3 6.9 62        Sensory Sites      Onset Lat Peak Lat Amp CV Comment   Site (ms) (ms) ( V) (m/s)    Left Median Anti Sensory   Wrist-Dig II 2.2 2.8 69 59    Left Median-Ulnar Palmar Sensory        Median   Palm-Wrist 1.25 1.75 75 64         Ulnar   Palm-Wrist 1.08 1.63 37 74    Left Radial Sensory   Forearm-Wrist 1.50 2.0 42 67    Left Ulnar Anti Sensory   Wrist-Dig V 1.65 2.3 30 67        NCS Waveforms:    Motor         Sensory                   Electromyography     Side Muscle Nerve Root Ins Act Fibs Psw Fasc Recrt Dur Amp Poly Comment   Left Deltoid Axillary C5-6 Nml Nml Nml Nml Nml Nml Nml 0    Left Triceps Radial C6-7-8 *Incr *1+ *1+ Nml Nml Nml Nml 0    Left PronatorTeres Median C6-7 Nml Nml Nml Nml Nml Nml Nml 0    Left 1stDorInt Ulnar C8-T1 Nml Nml Nml Nml Nml Nml Nml 0    Left Abd Poll Brev Median C8-T1 Nml Nml Nml Nml Nml Nml Nml 0    Left Biceps2 Musculocut C5-6 Nml Nml Nml Nml Nml Nml Nml 0    Left C6 Parasp Rami C6 Nml Nml Nml Nml  Nml Nml     Left C7 Parasp Rami C7 Nml Nml Nml Nml  Nml Nml           Comment NCS: Normal study  1.  Normal nerve conduction studies left upper extremity.    Comment EMG: Abnormal study  1.  Mild positive waves and fibrillation of the left tricep.  Remainder left upper extremity needle EMG normal.    Interpretation: Abnormal study: There is electrodiagnostic evidence of:     1. Increased insertional activity with denervation potentials of the left tricep.  These findings are nonspecific and nondiagnostic.  Under the correct clinical condition, they can be observed in an active left C7 radiculopathy     2.  There is no electrodiagnostic evidence of  brachial plexopathy or focal neuropathy in the left upper extremity.    The testing was completed in its entirety by the physician.       It was our pleasure caring for your patient today, if there any questions or concerns please do not hesitate to contact us.

## 2023-12-27 NOTE — PROGRESS NOTES
Assessment/Plan:      June was seen today for emg.    Diagnoses and all orders for this visit:    Cervical radicular pain  -     EMG  -     Neurosurgery Referral; Future  -     HI NEEDLE EMG EA EXTREMTY W/PARASPINAL AREA COMPLETE  -     HI NCS MOTOR W OR W/O F-WAVE, 7 OR 8    Cervical disc herniation  -     Neurosurgery Referral; Future    Kyphosis of cervical region, unspecified kyphosis type    Myofascial pain         Assessment: Pleasant 43 year old female otherwise healthy with:     1.  Approximate 6-7month history of left cervical radicular pain with paresthesias to digits 2 and 3 likely C7. There is also associated myofascial pain in the left parascapular region.  Consistent with cervical radicular pain with a left paracentral disc herniation at C6-7 with lateral recess stenosis.  Neurologically intact but with positive neural tension signs/Spurling's to the left.  Has some improvement but persistent pain despite NSAIDs, activity modifications, physical therapy, chiropractor.  No improvement with Medrol Dosepak and additional physical therapy.  Equivocal response to gabapentin stopped taking this.  No benefit following interlaminar epidural steroid injection On November 2, 2023.  CT scan shows the left C6/7 disc herniation with kyphosis at C5-6 and 6-7.  There may be slight to start of calcification on my review of the imaging but no overt calcification of the disc herniation.  Findings consistent with a left C7 radiculopathy on EMG.       Discussion:    1.  I reviewed the CT scan as well as the EMG today.  We looked at the CT images and she has failed conservative management thus far including cervical epidural, PT and Medrol Dosepak.  Has not tried gabapentin or meloxicam.  She continues to have constant paresthesias.  We discussed options of monitoring symptoms along with surgical evaluation.  Given potential for disc calcification and persistence of symptoms I would recommend being more aggressive in getting  an opinion from neurosurgery and she agrees.    2.  Neurosurgical evaluation recommended order placed.    3.  May trial gabapentin or meloxicam as previously ordered.    4.  Follow-up with me as needed after seeing neurosurgery.      It was our pleasure caring for your patient today, if there any questions or concerns please do not hesitate to contact us.      Subjective:   Patient ID: Roshni Oliver is a 43 year old female.    History of Present Illness: Patient presents for follow-up of cervical spine pain left cervical radicular pain.  Pain is improved some but has constant paresthesias digits 1-3 of the left hand and she is left-handed.  Her symptoms are 3/10 today.  Worse first thing in the morning when she wakes up nothing makes her pain better.  Has not tried meloxicam or gabapentin as of yet.  Had an EMG done today.    EMG: Denervation findings of the left tricep consistent with C7 radiculopathy.      Imaging: CT   report and images were personally reviewed and discussed with the patient.  A plastic model was utilized during the discussion.   CT of the cervical spine reviewed this shows kyphosis at C5-6 with moderate disc height loss at C6/7 and mild at C5-6.  Moderate left facet arthropathy C7-T1 prominent left lateral foraminal disc extrusion at C6-7 with severe left lateral recess stenosis no calcification of the disc.    Review of Systems: Pertinent positives: Constant paresthesias left arm.  Pertinent negatives: No weakness some difficulty swallowing.  No bowel or bladder incontinence.  No urinary retention.  No fevers, unintentional weight loss, balance changes, headaches, frequent falling, or coordination difficulties.  All others reviewed are negative.         Past Medical History:   Diagnosis Date    UTI (urinary tract infection) 05/11       The following portions of the patient's history were reviewed and updated as appropriate: allergies, current medications, past family history, past medical  history, past social history, past surgical history and problem list.           Objective:   Physical Exam:    /62   Pulse 79   There is no height or weight on file to calculate BMI.      General: Alert and oriented with normal affect. Attention, knowledge, memory, and language are intact. No acute distress.   Eyes: Sclerae are clear.  Respirations: Unlabored.  Skin: No rashes seen of the arms.    Gait:  Nonantalgic    Sensation is intact to light touch throughout the upper extremities.  Reflexes are 2+ and symmetric in the biceps triceps and brachioradialis     Manual muscle testing reveals:  Right /Left out of 5  5/5 shoulder abductors  5/5 elbow flexors  5/5 elbow extensors  5/5 wrist extensors  5/5 interosseus  5/5 finger flexors

## 2024-01-08 ENCOUNTER — OFFICE VISIT (OUTPATIENT)
Dept: FAMILY MEDICINE | Facility: CLINIC | Age: 44
End: 2024-01-08
Payer: COMMERCIAL

## 2024-01-08 DIAGNOSIS — L81.4 LENTIGINES: ICD-10-CM

## 2024-01-08 DIAGNOSIS — Z87.898 HISTORY OF ATYPICAL NEVUS: ICD-10-CM

## 2024-01-08 DIAGNOSIS — D18.01 CHERRY ANGIOMA: ICD-10-CM

## 2024-01-08 DIAGNOSIS — D22.9 MULTIPLE NEVI: Primary | ICD-10-CM

## 2024-01-08 PROCEDURE — 99203 OFFICE O/P NEW LOW 30 MIN: CPT | Performed by: PHYSICIAN ASSISTANT

## 2024-01-08 ASSESSMENT — PAIN SCALES - GENERAL: PAINLEVEL: NO PAIN (0)

## 2024-01-08 NOTE — PROGRESS NOTES
[facility] Dermatology Note  Encounter Date: Jan 8, 2024  Office Visit      Dermatology Problem List:  Last FBSC performed on 1/8/24    Hx of DN, R chest - excised in 2008  - Do not have records from [place] at this time    Social: 3 children.  ____________________________________________    Assessment & Plan:  # Hx of DN: R Chest  - Per patient report in [place] she had it removed, will request records.   - Signs and Symptoms of non-melanoma skin cancer and ABCDEs of melanoma reviewed with patient. Patient encouraged to perform monthly self skin exams and educated on how to perform them. UV precautions reviewed with patient. Patient was asked about new or changing moles/lesions on body.   - Sunscreen: Apply 20 minutes prior to going outdoors and reapply every two hours, when wet or sweating. We recommend using an SPF 30 or higher, and to use one that is water resistant.       # Benign findings: multiple benign nevi, lentigines, cherry angiomas   - edu on benign etiology  - Signs and Symptoms of non-melanoma skin cancer and ABCDEs of melanoma reviewed with patient. Patient encouraged to perform monthly self skin exams and educated on how to perform them. UV precautions reviewed with patient. Patient was asked about new or changing moles/lesions on body.   - Sunscreen: Apply 20 minutes prior to going outdoors and reapply every two hours, when wet or sweating. We recommend using an SPF 30 or higher, and to use one that is water resistant.     - RTC for changes     Procedures Performed:   None    Follow-up: 1 year(s) in-person, or earlier for new or changing lesions    Staff and scribe    Scribe Disclosure:   I, EVERETT SAPP, am serving as a scribe; to document services personally performed by Beulah Reynolds PA-C -based on data collection and the provider's statements to me.     Provider Disclosure:  I agree with above History, Review of Systems, Physical exam and Plan.  I have reviewed the  content of the documentation and have edited it as needed. I have personally performed the services documented here and the documentation accurately represents those services and the decisions I have made.      Electronically signed by:     All risks, benefits and alternatives were discussed with patient.  Patient is in agreement and understands the assessment and plan.  All questions were answered.    Beulah Reynolds PA-C, MPAS  [facility]: Phone: [phone], Fax: [phone]  [facility]: Phone: [phone],  Fax: [phone]  [facility]: Phone: [phone], Fax: [phone]  ____________________________________________    CC: Skin Check (H/o atypical on chest)      Reviewed patients past medical history and pertinent chart review prior to patient's visit today.     HPI:  Ms. Roshni Oliver is a 43 year old female who presents today as a new patient for a FBSC. Referred by her PCP for FBSE.    Patient did report that she has a hx of atypical nevus on her chest.    Denied any personal and family hx of MM.     Patient is otherwise feeling well, without additional concerns.    Labs:  N/A    Physical Exam:  Vitals: Breastfeeding No   SKIN: Total skin excluding the undergarment areas was performed. The exam included the head/face, neck, both arms, chest, back, abdomen, both legs, digits and/or nails.    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   - Multiple regular brown pigmented macules and papules are identified on the trunk and extremities.   - Scattered brown macules on sun exposed areas.  - well healed scar on the R chest - no repigmentation noted  - No other lesions of concern on areas examined.     Medications:  Current Outpatient Medications   Medication    gabapentin (NEURONTIN) 100 MG capsule    ibuprofen (ADVIL/MOTRIN) 800 MG  tablet    meloxicam (MOBIC) 15 MG tablet    methylPREDNISolone (MEDROL DOSEPAK) 4 MG tablet therapy pack     No current facility-administered medications for this visit.      Past Medical/Surgical History:   Patient Active Problem List   Diagnosis    Supervision of other normal pregnancy, antepartum    Encounter for triage in pregnant patient    Neck pain     Past Medical History:   Diagnosis Date    UTI (urinary tract infection) 05/11

## 2024-01-08 NOTE — LETTER
1/8/2024         RE: Roshni Oliver  [address] [facility] [address]        Dear Colleague,    Thank you for referring your patient, Roshni Oliver, to the [facility]. Please see a copy of my visit note below.    [facility] Dermatology Note  Encounter Date: Jan 8, 2024  Office Visit      Dermatology Problem List:  Last FBSC performed on 1/8/24    Hx of DN, R chest - excised in 2008  - Do not have records from [place] at this time    Social: 3 children.  ____________________________________________    Assessment & Plan:  # Hx of DN: R Chest  - Per patient report in [place] she had it removed, will request records.   - Signs and Symptoms of non-melanoma skin cancer and ABCDEs of melanoma reviewed with patient. Patient encouraged to perform monthly self skin exams and educated on how to perform them. UV precautions reviewed with patient. Patient was asked about new or changing moles/lesions on body.   - Sunscreen: Apply 20 minutes prior to going outdoors and reapply every two hours, when wet or sweating. We recommend using an SPF 30 or higher, and to use one that is water resistant.       # Benign findings: multiple benign nevi, lentigines, cherry angiomas   - edu on benign etiology  - Signs and Symptoms of non-melanoma skin cancer and ABCDEs of melanoma reviewed with patient. Patient encouraged to perform monthly self skin exams and educated on how to perform them. UV precautions reviewed with patient. Patient was asked about new or changing moles/lesions on body.   - Sunscreen: Apply 20 minutes prior to going outdoors and reapply every two hours, when wet or sweating. We recommend using an SPF 30 or higher, and to use one that is water resistant.     - RTC for changes     Procedures Performed:   None    Follow-up: 1 year(s) in-person, or earlier for new or changing lesions    Staff and scribe    Scribe Disclosure:   EVERETT STUART, am serving as a  scribe; to document services personally performed by Beulah Reynolds PA-C -based on data collection and the provider's statements to me.     Provider Disclosure:  I agree with above History, Review of Systems, Physical exam and Plan.  I have reviewed the content of the documentation and have edited it as needed. I have personally performed the services documented here and the documentation accurately represents those services and the decisions I have made.      Electronically signed by:     All risks, benefits and alternatives were discussed with patient.  Patient is in agreement and understands the assessment and plan.  All questions were answered.    Beulah Reynolds PA-C, [facility]  [facility]: Phone: [phone], Fax: [phone]  [facility]: Phone: [phone],  Fax: [phone]  [facility]: Phone: [phone], Fax: [phone]  ____________________________________________    CC: Skin Check (H/o atypical on chest)      Reviewed patients past medical history and pertinent chart review prior to patient's visit today.     HPI:  Ms. Roshni Oliver is a 43 year old female who presents today as a new patient for a FBSC. Referred by her PCP for FBSE.    Patient did report that she has a hx of atypical nevus on her chest.    Denied any personal and family hx of MM.     Patient is otherwise feeling well, without additional concerns.    Labs:  N/A    Physical Exam:  Vitals: Breastfeeding No   SKIN: Total skin excluding the undergarment areas was performed. The exam included the head/face, neck, both arms, chest, back, abdomen, both legs, digits and/or nails.    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   - Multiple regular brown pigmented macules and papules are identified on the trunk and extremities.   - Scattered brown macules on sun exposed  areas.  - well healed scar on the R chest - no repigmentation noted  - No other lesions of concern on areas examined.     Medications:  Current Outpatient Medications   Medication     gabapentin (NEURONTIN) 100 MG capsule     ibuprofen (ADVIL/MOTRIN) 800 MG tablet     meloxicam (MOBIC) 15 MG tablet     methylPREDNISolone (MEDROL DOSEPAK) 4 MG tablet therapy pack     No current facility-administered medications for this visit.      Past Medical/Surgical History:   Patient Active Problem List   Diagnosis     Supervision of other normal pregnancy, antepartum     Encounter for triage in pregnant patient     Neck pain     Past Medical History:   Diagnosis Date     UTI (urinary tract infection) 05/11                         Again, thank you for allowing me to participate in the care of your patient.        Sincerely,        Beulah Reynolds PA-C

## 2024-01-08 NOTE — PATIENT INSTRUCTIONS
Patient Education       Proper skin care from [facility]:    -Eliminate harsh soaps as they strip the natural oils from the skin, often resulting in dry itchy skin ( i.e. Dial, Zest, [place])  -Use mild soaps such as Cetaphil or Dove Sensitive Skin in the shower. You do not need to use soap on arms, legs, and trunk every time you shower unless visibly soiled.   -Avoid hot or cold showers.  -After showering, lightly dry off and apply moisturizing within 2-3 minutes. This will help trap moisture in the skin.   -Aggressive use of a moisturizer at least 1-2 times a day to the entire body (including -Vanicream, Cetaphil, Aquaphor or Cerave) and moisturize hands after every washing.  -We recommend using moisturizers that come in a tub that needs to be scooped out, not a pump. This has more of an oil base. It will hold moisture in your skin much better than a water base moisturizer. The above recommended are non-pore clogging.      Wear a sunscreen with at least SPF 30 on your face, ears, neck and V of the chest daily. Wear sunscreen on other areas of the body if those areas are exposed to the sun throughout the day. Sunscreens can contain physical and/or chemical blockers. Physical blockers are less likely to clog pores, these include zinc oxide and titanium dioxide. Reapply every two hour and after swimming.     Sunscreen examples: https://www.ewg.org/sunscreen/    UV radiation  UVA radiation remains constant throughout the day and throughout the year. It is a longer wavelength than UVB and therefore penetrates deeper into the skin leading to immediate and delayed tanning, photoaging, and skin cancer. 70-80% of UVA and UVB radiation occurs between the hours of 10am-2pm.  UVB radiation  UVB radiation causes the most harmful effects and is more significant during the summer months. However, snow and ice can reflect UVB radiation leading to skin damage during the winter months as well. UVB radiation is  responsible for tanning, burning, inflammation, delayed erythema (pinkness), pigmentation (brown spots), and skin cancer.     I recommend self monthly full body exams and yearly full body exams with a dermatology provider. If you develop a new or changing lesion please follow up for examination. Most skin cancers are pink and scaly or pink and pearly. However, we do see blue/brown/black skin cancers.  Consider the ABCDEs of melanoma when giving yourself your monthly full body exam ( don't forget the groin, buttocks, feet, toes, etc). A-asymmetry, B-borders, C-color, D-diameter, E-elevation or evolving. If you see any of these changes please follow up in clinic. If you cannot see your back I recommend purchasing a hand held mirror to use with a larger wall mirror.       Checking for Skin Cancer  You can find cancer early by checking your skin each month. There are 3 kinds of skin cancer. They are melanoma, basal cell carcinoma, and squamous cell carcinoma. Doing monthly skin checks is the best way to find new marks or skin changes. Follow the instructions below for checking your skin.   The ABCDEs of checking moles for melanoma   Check your moles or growths for signs of melanoma using ABCDE:   Asymmetry: the sides of the mole or growth don t match  Border: the edges are ragged, notched, or blurred  Color: the color within the mole or growth varies  Diameter: the mole or growth is larger than 6 mm (size of a pencil eraser)  Evolving: the size, shape, or color of the mole or growth is changing (evolving is not shown in the images below)    Checking for other types of skin cancer  Basal cell carcinoma or squamous cell carcinoma have symptoms such as:     A spot or mole that looks different from all other marks on your skin  Changes in how an area feels, such as itching, tenderness, or pain  Changes in the skin's surface, such as oozing, bleeding, or scaliness  A sore that does not heal  New swelling or redness beyond  the border of a mole    Who s at risk?  Anyone can get skin cancer. But you are at greater risk if you have:   Fair skin, light-colored hair, or light-colored eyes  Many moles or abnormal moles on your skin  A history of sunburns from sunlight or tanning beds  A family history of skin cancer  A history of exposure to radiation or chemicals  A weakened immune system  If you have had skin cancer in the past, you are at risk for recurring skin cancer.   How to check your skin  Do your monthly skin checkups in front of a full-length mirror. Check all parts of your body, including your:   Head (ears, face, neck, and scalp)  Torso (front, back, and sides)  Arms (tops, undersides, upper, and lower armpits)  Hands (palms, backs, and fingers, including under the nails)  Buttocks and genitals  Legs (front, back, and sides)  Feet (tops, soles, toes, including under the nails, and between toes)  If you have a lot of moles, take digital photos of them each month. Make sure to take photos both up close and from a distance. These can help you see if any moles change over time.   Most skin changes are not cancer. But if you see any changes in your skin, call your doctor right away. Only he or she can diagnose a problem. If you have skin cancer, seeing your doctor can be the first step toward getting the treatment that could save your life.   [employer] last reviewed this educational content on 4/1/2019 2000-2020 [employer]. [address] [place] [address], [place] [address]. All rights reserved. This information is not intended as a substitute for professional medical care. Always follow your healthcare professional's instructions.

## 2024-06-05 ENCOUNTER — E-VISIT (OUTPATIENT)
Dept: PEDIATRICS | Facility: CLINIC | Age: 44
End: 2024-06-05
Payer: COMMERCIAL

## 2024-06-05 DIAGNOSIS — H10.32 ACUTE BACTERIAL CONJUNCTIVITIS OF LEFT EYE: Primary | ICD-10-CM

## 2024-06-05 PROCEDURE — 99421 OL DIG E/M SVC 5-10 MIN: CPT | Mod: 93 | Performed by: PEDIATRICS

## 2024-06-05 RX ORDER — POLYMYXIN B SULFATE AND TRIMETHOPRIM 1; 10000 MG/ML; [USP'U]/ML
SOLUTION OPHTHALMIC
Qty: 10 ML | Refills: 0 | Status: SHIPPED | OUTPATIENT
Start: 2024-06-05 | End: 2024-06-12

## 2024-06-05 NOTE — PATIENT INSTRUCTIONS
Thank you for choosing us for your care. I have placed an order for a prescription so that you can start treatment. View your full visit summary for details by clicking on the link below. Your pharmacist will able to address any questions you may have about the medication.     If you re not feeling better within 2-3 days, please schedule an appointment.  You can schedule an appointment right here in [facility], or call [phone]  If the visit is for the same symptoms as your eVisit, we ll refund the cost of your eVisit if seen within seven days.    Pinkeye: Care Instructions  Overview     Pinkeye is redness and swelling of the eye surface and the conjunctiva (the lining of the eyelid and the covering of the white part of the eye). Pinkeye is also called conjunctivitis. Pinkeye is often caused by infection with bacteria or a virus. Dry air, allergies, smoke, and chemicals are other common causes.  Pinkeye often gets better on its own in 7 to 10 days. Antibiotics only help if the pinkeye is caused by bacteria. Pinkeye caused by infection spreads easily. If an allergy or chemical is causing pinkeye, it will not go away unless you can avoid whatever is causing it.  Follow-up care is a key part of your treatment and safety. Be sure to make and go to all appointments, and call your doctor if you are having problems. It's also a good idea to know your test results and keep a list of the medicines you take.  How can you care for yourself at home?  Wash your hands often. Always wash them before and after you treat pinkeye or touch your eyes or face.  Use moist cotton or a clean, wet cloth to remove crust. Wipe from the inside corner of the eye to the outside. Use a clean part of the cloth for each wipe.  Put cold or warm wet cloths on your eye a few times a day if the eye hurts.  Do not wear contact lenses or eye makeup until the pinkeye is gone. Throw away any eye makeup you were using when you got pinkeye. Clean your  "contacts and storage case. If you wear disposable contacts, use a new pair when your eye has cleared and it is safe to wear contacts again.  If the doctor gave you antibiotic ointment or eyedrops, use them as directed. Use the medicine for as long as instructed, even if your eye starts looking better soon. Keep the bottle tip clean, and do not let it touch the eye area.  To put in eyedrops or ointment:  Tilt your head back, and pull your lower eyelid down with one finger.  Drop or squirt the medicine inside the lower lid.  Close your eye for 30 to 60 seconds to let the drops or ointment move around.  Do not touch the ointment or dropper tip to your eyelashes or any other surface.  Do not share towels, pillows, or washcloths while you have pinkeye.  When should you call for help?   Call your doctor now or seek immediate medical care if:    You have pain in your eye, not just irritation on the surface.     You have a change in vision or loss of vision.     You have an increase in discharge from the eye.     Your eye has not started to improve or begins to get worse within 48 hours after you start using antibiotics.     Pinkeye lasts longer than 7 days.   Watch closely for changes in your health, and be sure to contact your doctor if you have any problems.  Where can you learn more?  Go to https://www.[employer].net/patiented  Enter Y392 in the search box to learn more about \"Pinkeye: Care Instructions.\"  Current as of: June 5, 2023               Content Version: 14.0    2322-0058 [employer].   Care instructions adapted under license by your healthcare professional. If you have questions about a medical condition or this instruction, always ask your healthcare professional. [employer] disclaims any warranty or liability for your use of this information.      "

## 2024-07-06 ENCOUNTER — HEALTH MAINTENANCE LETTER (OUTPATIENT)
Age: 44
End: 2024-07-06

## 2024-09-13 ENCOUNTER — MYC MEDICAL ADVICE (OUTPATIENT)
Dept: PEDIATRICS | Facility: CLINIC | Age: 44
End: 2024-09-13
Payer: COMMERCIAL

## 2024-09-13 NOTE — TELEPHONE ENCOUNTER
See patient's MyChart message   - Patient requesting referrals/orders for the following:     Mammogram   Dermatology annual screening   OBGYN referral     - Patient states that her insurance requires referrals and is wondering if she needs to schedule an appointment to receive these referrals     Sent a [employer] message to the patient   - Informed the patient that she is due for her yearly preventative visit   - Informed the patient that she could have these requests addressed at a yearly preventative visit or she could submit an eVisit to request these referrals/orders     Daniella VALDEZ RN   [facility]

## 2025-01-09 ENCOUNTER — E-VISIT (OUTPATIENT)
Dept: PEDIATRICS | Facility: CLINIC | Age: 45
End: 2025-01-09
Payer: COMMERCIAL

## 2025-01-09 ENCOUNTER — OFFICE VISIT (OUTPATIENT)
Dept: DERMATOLOGY | Facility: CLINIC | Age: 45
End: 2025-01-09
Attending: PHYSICIAN ASSISTANT
Payer: COMMERCIAL

## 2025-01-09 DIAGNOSIS — D18.01 CHERRY ANGIOMA: ICD-10-CM

## 2025-01-09 DIAGNOSIS — Z86.018 HISTORY OF DYSPLASTIC NEVUS: Primary | ICD-10-CM

## 2025-01-09 DIAGNOSIS — L98.9 SKIN LESION: ICD-10-CM

## 2025-01-09 DIAGNOSIS — Z12.31 OTHER SCREENING MAMMOGRAM: Primary | ICD-10-CM

## 2025-01-09 DIAGNOSIS — D49.2 NEOPLASM OF SKIN: ICD-10-CM

## 2025-01-09 DIAGNOSIS — L81.4 LENTIGINES: ICD-10-CM

## 2025-01-09 DIAGNOSIS — D22.9 MULTIPLE BENIGN NEVI: ICD-10-CM

## 2025-01-09 NOTE — PATIENT INSTRUCTIONS
Wound Care After a Biopsy    What is a skin biopsy?  A skin biopsy allows the doctor to examine a very small piece of tissue under the microscope to determine the diagnosis and the best treatment for the skin condition. A local anesthetic (numbing medicine)  is injected with a very small needle into the skin area to be tested. A small piece of skin is taken from the area. Sometimes a suture (stitch) is used.     What are the risks of a skin biopsy?  I will experience scar, bleeding, swelling, pain, crusting and redness. I may experience incomplete removal or recurrence. Risks of this procedure are excessive bleeding, bruising, infection, nerve damage, numbness, thick (hypertrophic or keloidal) scar and non-diagnostic biopsy.    How should I care for my wound for the first 24 hours?  Keep the wound dry and covered for 24 hours  If it bleeds, hold direct pressure on the area for 15 minutes. If bleeding does not stop then go to the emergency room  Avoid strenuous exercise the first 1-2 days or as your doctor instructs you    How should I care for the wound after 24 hours?  After 24 hours, remove the bandage  You may bathe or shower as normal  If you had a scalp biopsy, you can shampoo as usual and can use shower water to clean the biopsy site daily  Clean the wound twice a day with gentle soap and water  Do not scrub, be gentle  Apply white petroleum/Vaseline after cleaning the wound with a cotton swab or a clean finger, and keep the site covered with a Bandaid /bandage. Bandages are not necessary with a scalp biopsy  If you are unable to cover the site with a Bandaid /bandage, re-apply ointment 2-3 times a day to keep the site moist. Moisture will help with healing  Avoid strenuous activity for first 1-2 days  Avoid lakes, rivers, pools, and oceans until the stitches are removed or the site is healed    How do I clean my wound?  Wash hands thoroughly with soap or use hand  before all wound care  Clean  the wound with gentle soap and water  Apply white petroleum/Vaseline  to wound after it is clean  Replace the Bandaid /bandage to keep the wound covered for the first few days or as instructed by your doctor  If you had a scalp biopsy, warm shower water to the area on a daily basis should suffice    What should I use to clean my wound?   Cotton-tipped applicators (Qtips )  White petroleum jelly (Vaseline ). Use a clean new container and use Q-tips to apply.  Bandaids   as needed  Gentle soap     How should I care for my wound long term?  Do not get your wound dirty  Keep up with wound care for one week or until the area is healed.  A small scab will form and fall off by itself when the area is completely healed. The area will be red and will become pink in color as it heals. Sun protection is very important for how your scar will turn out. Sunscreen with an SPF 30 or greater is recommended once the area is healed.  If you have stitches, stitches need to be removed in 10 days. You may return to our clinic for this or you may have it done locally at your doctor s office.  You should have some soreness but it should be mild and slowly go away over several days. Talk to your doctor about using tylenol for pain,    When should I call my doctor?  If you have increased:   Pain or swelling  Pus or drainage (clear or slightly yellow drainage is ok)  Temperature over 100F  Spreading redness or warmth around wound    When will I hear about my results?  The biopsy results can take 2-3 weeks to come back. The clinic will call you with the results, send you a FastConnect[employer] message, or have you schedule a follow-up clinic or phone time to discuss the results. Contact our clinics if you do not hear from us in 3 weeks.     Who should I call with questions?  [facility]: [phone]   [facility]: [phone]  For urgent needs outside of business hours call the [facility] at [phone] and ask for the dermatology resident on call   Proper skin care from [facility]:    -Eliminate harsh soaps as they strip the natural oils from the skin, often resulting in dry itchy skin ( i.e. Dial, Zest, Tamazight Spring)  -Use mild soaps such as Cetaphil or Dove Sensitive Skin in the shower. You do not need to use soap on arms, legs, and trunk every time you shower unless visibly soiled.   -Avoid hot or cold showers.  -After showering, lightly dry off and apply moisturizing within 2-3 minutes. This will help trap moisture in the skin.   -Aggressive use of a moisturizer at least 1-2 times a day to the entire body (including -Vanicream, Cetaphil, Aquaphor or Cerave) and moisturize hands after every washing.  -We recommend using moisturizers that come in a tub that needs to be scooped out, not a pump. This has more of an oil base. It will hold moisture in your skin much better than a water base moisturizer. The above recommended are non-pore clogging.      Wear a sunscreen with at least SPF 30 on your face, ears, neck and V of the chest daily. Wear sunscreen on other areas of the body if those areas are exposed to the sun throughout the day. Sunscreens can contain physical and/or chemical blockers. Physical blockers are less likely to clog pores, these include zinc oxide and titanium dioxide. Reapply every two hour and after swimming.     Sunscreen examples: https://www.ewg.org/sunscreen/    UV radiation  UVA radiation remains constant throughout the day and throughout the year. It is a longer wavelength than UVB and therefore penetrates deeper into the skin leading to immediate and delayed tanning, photoaging, and skin cancer. 70-80% of UVA and UVB radiation occurs between the hours of 10am-2pm.  UVB radiation  UVB radiation causes the most harmful effects and is more significant during the summer months. However, snow and ice can reflect UVB radiation leading to skin damage during  the winter months as well. UVB radiation is responsible for tanning, burning, inflammation, delayed erythema (pinkness), pigmentation (brown spots), and skin cancer.     I recommend self monthly full body exams and yearly full body exams with a dermatology provider. If you develop a new or changing lesion please follow up for examination. Most skin cancers are pink and scaly or pink and pearly. However, we do see blue/brown/black skin cancers.  Consider the ABCDEs of melanoma when giving yourself your monthly full body exam ( don't forget the groin, buttocks, feet, toes, etc). A-asymmetry, B-borders, C-color, D-diameter, E-elevation or evolving. If you see any of these changes please follow up in clinic. If you cannot see your back I recommend purchasing a hand held mirror to use with a larger wall mirror.       Checking for Skin Cancer  You can find cancer early by checking your skin each month. There are 3 kinds of skin cancer. They are melanoma, basal cell carcinoma, and squamous cell carcinoma. Doing monthly skin checks is the best way to find new marks or skin changes. Follow the instructions below for checking your skin.   The ABCDEs of checking moles for melanoma   Check your moles or growths for signs of melanoma using ABCDE:   Asymmetry: the sides of the mole or growth don t match  Border: the edges are ragged, notched, or blurred  Color: the color within the mole or growth varies  Diameter: the mole or growth is larger than 6 mm (size of a pencil eraser)  Evolving: the size, shape, or color of the mole or growth is changing (evolving is not shown in the images below)    Checking for other types of skin cancer  Basal cell carcinoma or squamous cell carcinoma have symptoms such as:     A spot or mole that looks different from all other marks on your skin  Changes in how an area feels, such as itching, tenderness, or pain  Changes in the skin's surface, such as oozing, bleeding, or scaliness  A sore that does  not heal  New swelling or redness beyond the border of a mole    Who s at risk?  Anyone can get skin cancer. But you are at greater risk if you have:   Fair skin, light-colored hair, or light-colored eyes  Many moles or abnormal moles on your skin  A history of sunburns from sunlight or tanning beds  A family history of skin cancer  A history of exposure to radiation or chemicals  A weakened immune system  If you have had skin cancer in the past, you are at risk for recurring skin cancer.   How to check your skin  Do your monthly skin checkups in front of a full-length mirror. Check all parts of your body, including your:   Head (ears, face, neck, and scalp)  Torso (front, back, and sides)  Arms (tops, undersides, upper, and lower armpits)  Hands (palms, backs, and fingers, including under the nails)  Buttocks and genitals  Legs (front, back, and sides)  Feet (tops, soles, toes, including under the nails, and between toes)  If you have a lot of moles, take digital photos of them each month. Make sure to take photos both up close and from a distance. These can help you see if any moles change over time.   Most skin changes are not cancer. But if you see any changes in your skin, call your doctor right away. Only he or she can diagnose a problem. If you have skin cancer, seeing your doctor can be the first step toward getting the treatment that could save your life.   [employer] last reviewed this educational content on 4/1/2019 2000-2020 [employer]. [address] [place] [address], [place] [address]. All rights reserved. This information is not intended as a substitute for professional medical care. Always follow your healthcare professional's instructions.       When should I call my doctor?  If you are worsening or not improving, please, contact us or seek urgent care as noted below.     Who should I call with questions (adults)?    [facility] [place] [phone]  For urgent needs  outside of business hours call the [facility] at [phone] and ask for the dermatology resident on call to be paged  If this is a medical emergency and you are unable to reach an ER, Call 911      If you need a prescription refill, please contact your pharmacy. Refills are approved or denied by our Physicians during normal business hours, Monday through Fridays  Per office policy, refills will not be granted if you have not been seen within the past year (or sooner depending on your child's condition)

## 2025-01-09 NOTE — PROGRESS NOTES
[facility] Dermatology Note  Encounter Date: Jan 9, 2025  Office Visit      Dermatology Problem List:  Last FBSC performed on 1/9/25    #NUB L dorsal foot, S/p Biopsy performed on 1/9/24, pending results.   Hx of DN, R chest - excised in 2008  - Do not have records from [place] at this time    Social: 3 children.  ____________________________________________    Assessment & Plan:  # Neoplasm of unspecified behavior of the skin (D49.2) on the R dorsal foot. . The differential diagnosis includes NMSC vs other. .   - Shave biopsy performed today, see procedure note below.   - Photographed today     # Hx of DN: R Chest  - Per patient report in [place] she had it removed, will request records.   - Signs and Symptoms of non-melanoma skin cancer and ABCDEs of melanoma reviewed with patient. Patient encouraged to perform monthly self skin exams and educated on how to perform them. UV precautions reviewed with patient. Patient was asked about new or changing moles/lesions on body.   - Sunscreen: Apply 20 minutes prior to going outdoors and reapply every two hours, when wet or sweating. We recommend using an SPF 30 or higher, and to use one that is water resistant.       # Benign findings: multiple benign nevi, lentigines, cherry angiomas   - edu on benign etiology  - Signs and Symptoms of non-melanoma skin cancer and ABCDEs of melanoma reviewed with patient. Patient encouraged to perform monthly self skin exams and educated on how to perform them. UV precautions reviewed with patient. Patient was asked about new or changing moles/lesions on body.   - Sunscreen: Apply 20 minutes prior to going outdoors and reapply every two hours, when wet or sweating. We recommend using an SPF 30 or higher, and to use one that is water resistant.     - RTC for changes     Procedures Performed:   - Shave biopsy procedure note, location(s): see above. After discussion of benefits and risks including but not limited to  bleeding, infection, scar, incomplete removal, recurrence, and non-diagnostic biopsy, written consent and photographs were obtained. The area was cleaned with isopropyl alcohol. 0.5mL of 1% lidocaine with epinephrine was injected to obtain adequate anesthesia of lesion(s). Shave biopsy at site(s) performed. Hemostasis was achieved with aluminium chloride. Petrolatum ointment and a sterile dressing were applied. The patient tolerated the procedure and no complications were noted. The patient was provided with verbal and written post care instructions.       Follow-up: 1 year(s) in-person, or earlier for new or changing lesions    Staff and scribe    Scribe Disclosure:   I, EVERETT SAPP, am serving as a scribe; to document services personally performed by Beulah Reynolds PA-C -based on data collection and the provider's statements to me.     Provider Disclosure:  I agree with above History, Review of Systems, Physical exam and Plan.  I have reviewed the content of the documentation and have edited it as needed. I have personally performed the services documented here and the documentation accurately represents those services and the decisions I have made.      Electronically signed by:     All risks, benefits and alternatives were discussed with patient.  Patient is in agreement and understands the assessment and plan.  All questions were answered.    Beulah Reynolds PA-C, MPAS  [facility] [place]: Phone: [phone], Fax: [phone]  [facility]: Phone: [phone],  Fax: [phone]  [facility]: Phone: [phone], Fax: [phone]  ____________________________________________    CC: Skin Check (FBSC/Concerns: none)      Reviewed patients past medical history and pertinent chart review prior to patient's visit today.     HPI:  Ms. Roshni Oliver is a 44 year old female who presents today as return  patient [place] [facility] Per patient, patient has a hx of DN.     Patient did not report any spots of concern except a bump on her foot which bothers her and rubs with sandals..     Denied any personal and family hx of MM.     Patient is otherwise feeling well, without additional concerns.    Labs:  N/A    Physical Exam:  Vitals: There were no vitals taken for this visit.  SKIN: Total skin excluding the undergarment areas was performed. The exam included the head/face, neck, both arms, chest, back, abdomen, both legs, digits and/or nails.    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   - Multiple regular brown pigmented macules and papules are identified on the trunk and extremities.   - Scattered brown macules on sun exposed areas.  - well healed scar on the R chest - no repigmentation noted  - L dorsal foot there is a 5 mm flesh colored papule   - No other lesions of concern on areas examined.         Medications:  Current Outpatient Medications   Medication Sig Dispense Refill    gabapentin (NEURONTIN) 100 MG capsule 100mg at bedtime x 3 days, then may increase to 200mg x 3 days, then may increase to 300mg at bedtime (Patient not taking: Reported on 1/8/2024) 90 capsule 1    ibuprofen (ADVIL/MOTRIN) 800 MG tablet Take 800 mg by mouth 2 times daily (Patient not taking: Reported on 1/8/2024)      meloxicam (MOBIC) 15 MG tablet Take 0.5-1 tablets (7.5-15 mg) by mouth daily as needed for pain (Patient not taking: Reported on 1/8/2024) 30 tablet 1    methylPREDNISolone (MEDROL DOSEPAK) 4 MG tablet therapy pack Follow Package Directions (Patient not taking: Reported on 1/8/2024) 21 tablet 0     No current facility-administered medications for this visit.      Past Medical/Surgical History:   Patient Active Problem List   Diagnosis    Supervision of other normal pregnancy, antepartum    Encounter for triage in pregnant patient    Neck pain     Past Medical History:    Diagnosis Date    UTI (urinary tract infection) 05/11

## 2025-01-09 NOTE — LETTER
1/9/2025      Roshni Oliver  [address] [address]      Dear Colleague,    Thank you for referring your patient, Roshni Oliver, to the [facility]. Please see a copy of my visit note below.    [facility] Dermatology Note  Encounter Date: Jan 9, 2025  Office Visit      Dermatology Problem List:  Last FBSC performed on 1/9/25    #NUB L dorsal foot, S/p Biopsy performed on 1/9/24, pending results.   Hx of DN, R chest - excised in 2008  - Do not have records from [place] at this time    Social: 3 children.  ____________________________________________    Assessment & Plan:  # Neoplasm of unspecified behavior of the skin (D49.2) on the R dorsal foot. . The differential diagnosis includes NMSC vs other. .   - Shave biopsy performed today, see procedure note below.   - Photographed today     # Hx of DN: R Chest  - Per patient report in [place] she had it removed, will request records.   - Signs and Symptoms of non-melanoma skin cancer and ABCDEs of melanoma reviewed with patient. Patient encouraged to perform monthly self skin exams and educated on how to perform them. UV precautions reviewed with patient. Patient was asked about new or changing moles/lesions on body.   - Sunscreen: Apply 20 minutes prior to going outdoors and reapply every two hours, when wet or sweating. We recommend using an SPF 30 or higher, and to use one that is water resistant.       # Benign findings: multiple benign nevi, lentigines, cherry angiomas   - edu on benign etiology  - Signs and Symptoms of non-melanoma skin cancer and ABCDEs of melanoma reviewed with patient. Patient encouraged to perform monthly self skin exams and educated on how to perform them. UV precautions reviewed with patient. Patient was asked about new or changing moles/lesions on body.   - Sunscreen: Apply 20 minutes prior to going outdoors and reapply every two hours, when wet or sweating. We recommend  using an SPF 30 or higher, and to use one that is water resistant.     - RTC for changes     Procedures Performed:   - Shave biopsy procedure note, location(s): see above. After discussion of benefits and risks including but not limited to bleeding, infection, scar, incomplete removal, recurrence, and non-diagnostic biopsy, written consent and photographs were obtained. The area was cleaned with isopropyl alcohol. 0.5mL of 1% lidocaine with epinephrine was injected to obtain adequate anesthesia of lesion(s). Shave biopsy at site(s) performed. Hemostasis was achieved with aluminium chloride. Petrolatum ointment and a sterile dressing were applied. The patient tolerated the procedure and no complications were noted. The patient was provided with verbal and written post care instructions.       Follow-up: 1 year(s) in-person, or earlier for new or changing lesions    Staff and scribe    Scribe Disclosure:   I, EVERETT SAPP, am serving as a scribe; to document services personally performed by Beulah Reynolds PA-C -based on data collection and the provider's statements to me.     Provider Disclosure:  I agree with above History, Review of Systems, Physical exam and Plan.  I have reviewed the content of the documentation and have edited it as needed. I have personally performed the services documented here and the documentation accurately represents those services and the decisions I have made.      Electronically signed by:     All risks, benefits and alternatives were discussed with patient.  Patient is in agreement and understands the assessment and plan.  All questions were answered.    Beulah Reynolds PA-C, [facility]  [facility] [place]: Phone: [phone], Fax: [phone]  [facility]: Phone: [phone],  Fax: [phone]  [facility]: Phone: [phone], Fax:  [phone]  ____________________________________________    CC: Skin Check (FBSC/Concerns: none)      Reviewed patients past medical history and pertinent chart review prior to patient's visit today.     HPI:  Ms. Roshni Oliver is a 44 year old female who presents today as return patient for a FBSC. Per patient, patient has a hx of DN.     Patient did not report any spots of concern except a bump on her foot which bothers her and rubs with sandals..     Denied any personal and family hx of MM.     Patient is otherwise feeling well, without additional concerns.    Labs:  N/A    Physical Exam:  Vitals: There were no vitals taken for this visit.  SKIN: Total skin excluding the undergarment areas was performed. The exam included the head/face, neck, both arms, chest, back, abdomen, both legs, digits and/or nails.    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   - Multiple regular brown pigmented macules and papules are identified on the trunk and extremities.   - Scattered brown macules on sun exposed areas.  - well healed scar on the R chest - no repigmentation noted  - L dorsal foot there is a 5 mm flesh colored papule   - No other lesions of concern on areas examined.         Medications:  Current Outpatient Medications   Medication Sig Dispense Refill     gabapentin (NEURONTIN) 100 MG capsule 100mg at bedtime x 3 days, then may increase to 200mg x 3 days, then may increase to 300mg at bedtime (Patient not taking: Reported on 1/8/2024) 90 capsule 1     ibuprofen (ADVIL/MOTRIN) 800 MG tablet Take 800 mg by mouth 2 times daily (Patient not taking: Reported on 1/8/2024)       meloxicam (MOBIC) 15 MG tablet Take 0.5-1 tablets (7.5-15 mg) by mouth daily as needed for pain (Patient not taking: Reported on 1/8/2024) 30 tablet 1     methylPREDNISolone (MEDROL DOSEPAK) 4 MG tablet therapy pack Follow Package Directions (Patient not taking: Reported on 1/8/2024) 21  tablet 0     No current facility-administered medications for this visit.      Past Medical/Surgical History:   Patient Active Problem List   Diagnosis     Supervision of other normal pregnancy, antepartum     Encounter for triage in pregnant patient     Neck pain     Past Medical History:   Diagnosis Date     UTI (urinary tract infection) 05/11                         Again, thank you for allowing me to participate in the care of your patient.        Sincerely,        Beulah Reynolds PA-C    Electronically signed

## 2025-01-09 NOTE — PATIENT INSTRUCTIONS
Thank you for choosing us for your care. I have placed the below referral(s) for you:  Orders Placed This Encounter   Procedures     MA Screen Bilateral w/Yassine     Dermatology  Referral, Adult

## 2025-01-13 LAB
PATH REPORT.COMMENTS IMP SPEC: NORMAL
PATH REPORT.COMMENTS IMP SPEC: NORMAL
PATH REPORT.FINAL DX SPEC: NORMAL
PATH REPORT.GROSS SPEC: NORMAL
PATH REPORT.MICROSCOPIC SPEC OTHER STN: NORMAL
PATH REPORT.RELEVANT HX SPEC: NORMAL

## 2025-04-18 ENCOUNTER — ANCILLARY PROCEDURE (OUTPATIENT)
Dept: MAMMOGRAPHY | Facility: CLINIC | Age: 45
End: 2025-04-18
Attending: PEDIATRICS
Payer: COMMERCIAL

## 2025-04-18 DIAGNOSIS — Z12.31 OTHER SCREENING MAMMOGRAM: ICD-10-CM

## 2025-04-18 PROCEDURE — 77063 BREAST TOMOSYNTHESIS BI: CPT | Mod: TC | Performed by: RADIOLOGY

## 2025-04-18 PROCEDURE — 77067 SCR MAMMO BI INCL CAD: CPT | Mod: TC | Performed by: RADIOLOGY

## 2025-07-13 ENCOUNTER — HEALTH MAINTENANCE LETTER (OUTPATIENT)
Age: 45
End: 2025-07-13